# Patient Record
Sex: MALE | Race: WHITE | NOT HISPANIC OR LATINO | Employment: OTHER | URBAN - METROPOLITAN AREA
[De-identification: names, ages, dates, MRNs, and addresses within clinical notes are randomized per-mention and may not be internally consistent; named-entity substitution may affect disease eponyms.]

---

## 2021-05-24 ENCOUNTER — HOSPITAL ENCOUNTER (INPATIENT)
Facility: HOSPITAL | Age: 81
LOS: 4 days | Discharge: HOME | End: 2021-05-28
Attending: INTERNAL MEDICINE | Admitting: INTERNAL MEDICINE

## 2021-05-24 ENCOUNTER — APPOINTMENT (OUTPATIENT)
Dept: GENERAL RADIOLOGY | Facility: HOSPITAL | Age: 81
End: 2021-05-24

## 2021-05-24 DIAGNOSIS — I21.21 ST ELEVATION MYOCARDIAL INFARCTION INVOLVING LEFT CIRCUMFLEX CORONARY ARTERY (HCC): Primary | ICD-10-CM

## 2021-05-24 PROBLEM — J44.9 COPD (CHRONIC OBSTRUCTIVE PULMONARY DISEASE) (HCC): Chronic | Status: ACTIVE | Noted: 2021-05-24

## 2021-05-24 PROBLEM — E11.9 DM (DIABETES MELLITUS), TYPE 2: Chronic | Status: ACTIVE | Noted: 2021-05-24

## 2021-05-24 PROBLEM — G47.33 OSA ON CPAP: Chronic | Status: ACTIVE | Noted: 2021-05-24

## 2021-05-24 PROBLEM — Z99.89 OSA ON CPAP: Status: ACTIVE | Noted: 2021-05-24

## 2021-05-24 PROBLEM — E11.9 DM (DIABETES MELLITUS), TYPE 2 (HCC): Status: ACTIVE | Noted: 2021-05-24

## 2021-05-24 PROBLEM — E78.5 DYSLIPIDEMIA: Chronic | Status: ACTIVE | Noted: 2021-05-24

## 2021-05-24 PROBLEM — I21.3 STEMI (ST ELEVATION MYOCARDIAL INFARCTION): Status: ACTIVE | Noted: 2021-05-24

## 2021-05-24 PROBLEM — G47.33 OSA ON CPAP: Status: ACTIVE | Noted: 2021-05-24

## 2021-05-24 PROBLEM — I10 HTN (HYPERTENSION): Status: ACTIVE | Noted: 2021-05-24

## 2021-05-24 PROBLEM — N40.0 ENLARGED PROSTATE: Status: ACTIVE | Noted: 2021-05-24

## 2021-05-24 PROBLEM — E78.5 DYSLIPIDEMIA: Status: ACTIVE | Noted: 2021-05-24

## 2021-05-24 PROBLEM — I44.2 COMPLETE HEART BLOCK: Status: ACTIVE | Noted: 2021-05-24

## 2021-05-24 PROBLEM — Z99.89 OSA ON CPAP: Chronic | Status: ACTIVE | Noted: 2021-05-24

## 2021-05-24 PROBLEM — J44.9 COPD (CHRONIC OBSTRUCTIVE PULMONARY DISEASE) (HCC): Status: ACTIVE | Noted: 2021-05-24

## 2021-05-24 PROBLEM — I10 HTN (HYPERTENSION): Chronic | Status: ACTIVE | Noted: 2021-05-24

## 2021-05-24 PROBLEM — N40.0 ENLARGED PROSTATE: Chronic | Status: ACTIVE | Noted: 2021-05-24

## 2021-05-24 LAB
ALBUMIN SERPL-MCNC: 3.9 G/DL (ref 3.5–5.2)
ALBUMIN/GLOB SERPL: 1.1 G/DL
ALP SERPL-CCNC: 88 U/L (ref 39–117)
ALT SERPL W P-5'-P-CCNC: 36 U/L (ref 1–41)
ANION GAP SERPL CALCULATED.3IONS-SCNC: 18 MMOL/L (ref 5–15)
AST SERPL-CCNC: 90 U/L (ref 1–40)
BASOPHILS # BLD AUTO: 0.05 10*3/MM3 (ref 0–0.2)
BASOPHILS NFR BLD AUTO: 0.4 % (ref 0–1.5)
BILIRUB SERPL-MCNC: 0.3 MG/DL (ref 0–1.2)
BUN SERPL-MCNC: 37 MG/DL (ref 8–23)
BUN/CREAT SERPL: 20.8 (ref 7–25)
CALCIUM SPEC-SCNC: 9.6 MG/DL (ref 8.6–10.5)
CHLORIDE SERPL-SCNC: 101 MMOL/L (ref 98–107)
CO2 SERPL-SCNC: 15 MMOL/L (ref 22–29)
CREAT SERPL-MCNC: 1.78 MG/DL (ref 0.76–1.27)
DEPRECATED RDW RBC AUTO: 42 FL (ref 37–54)
EOSINOPHIL # BLD AUTO: 0.1 10*3/MM3 (ref 0–0.4)
EOSINOPHIL NFR BLD AUTO: 0.9 % (ref 0.3–6.2)
ERYTHROCYTE [DISTWIDTH] IN BLOOD BY AUTOMATED COUNT: 13.3 % (ref 12.3–15.4)
GFR SERPL CREATININE-BSD FRML MDRD: 37 ML/MIN/1.73
GLOBULIN UR ELPH-MCNC: 3.4 GM/DL
GLUCOSE BLDC GLUCOMTR-MCNC: 255 MG/DL (ref 70–130)
GLUCOSE BLDC GLUCOMTR-MCNC: 334 MG/DL (ref 70–130)
GLUCOSE SERPL-MCNC: 385 MG/DL (ref 65–99)
HBA1C MFR BLD: 11.7 % (ref 4.8–5.6)
HCT VFR BLD AUTO: 30 % (ref 37.5–51)
HGB BLD-MCNC: 11 G/DL (ref 13–17.7)
IMM GRANULOCYTES # BLD AUTO: 0.06 10*3/MM3 (ref 0–0.05)
IMM GRANULOCYTES NFR BLD AUTO: 0.5 % (ref 0–0.5)
LYMPHOCYTES # BLD AUTO: 1.82 10*3/MM3 (ref 0.7–3.1)
LYMPHOCYTES NFR BLD AUTO: 16 % (ref 19.6–45.3)
MCH RBC QN AUTO: 34.7 PG (ref 26.6–33)
MCHC RBC AUTO-ENTMCNC: 39.1 G/DL (ref 31.5–35.7)
MCV RBC AUTO: 88.8 FL (ref 79–97)
MONOCYTES # BLD AUTO: 0.58 10*3/MM3 (ref 0.1–0.9)
MONOCYTES NFR BLD AUTO: 5.1 % (ref 5–12)
NEUTROPHILS NFR BLD AUTO: 77.1 % (ref 42.7–76)
NEUTROPHILS NFR BLD AUTO: 8.76 10*3/MM3 (ref 1.7–7)
NRBC BLD AUTO-RTO: 0 /100 WBC (ref 0–0.2)
PLATELET # BLD AUTO: 250 10*3/MM3 (ref 140–450)
PMV BLD AUTO: 12.4 FL (ref 6–12)
POTASSIUM SERPL-SCNC: 4.7 MMOL/L (ref 3.5–5.2)
PROT SERPL-MCNC: 7.3 G/DL (ref 6–8.5)
QT INTERVAL: 400 MS
QTC INTERVAL: 461 MS
RBC # BLD AUTO: 3.4 10*6/MM3 (ref 4.14–5.8)
SODIUM SERPL-SCNC: 134 MMOL/L (ref 136–145)
TROPONIN T SERPL-MCNC: 1.5 NG/ML (ref 0–0.03)
TSH SERPL DL<=0.05 MIU/L-ACNC: 1.29 UIU/ML (ref 0.27–4.2)
WBC # BLD AUTO: 11.85 10*3/MM3 (ref 3.4–10.8)

## 2021-05-24 PROCEDURE — 82565 ASSAY OF CREATININE: CPT

## 2021-05-24 PROCEDURE — C1725 CATH, TRANSLUMIN NON-LASER: HCPCS | Performed by: INTERNAL MEDICINE

## 2021-05-24 PROCEDURE — 84484 ASSAY OF TROPONIN QUANT: CPT | Performed by: INTERNAL MEDICINE

## 2021-05-24 PROCEDURE — 5A1223Z PERFORMANCE OF CARDIAC PACING, CONTINUOUS: ICD-10-PCS | Performed by: INTERNAL MEDICINE

## 2021-05-24 PROCEDURE — 99223 1ST HOSP IP/OBS HIGH 75: CPT | Performed by: INTERNAL MEDICINE

## 2021-05-24 PROCEDURE — 84443 ASSAY THYROID STIM HORMONE: CPT | Performed by: INTERNAL MEDICINE

## 2021-05-24 PROCEDURE — C1769 GUIDE WIRE: HCPCS | Performed by: INTERNAL MEDICINE

## 2021-05-24 PROCEDURE — C1894 INTRO/SHEATH, NON-LASER: HCPCS | Performed by: INTERNAL MEDICINE

## 2021-05-24 PROCEDURE — 25010000002 ONDANSETRON PER 1 MG: Performed by: INTERNAL MEDICINE

## 2021-05-24 PROCEDURE — 25010000002 HEPARIN (PORCINE) PER 1000 UNITS: Performed by: INTERNAL MEDICINE

## 2021-05-24 PROCEDURE — 87636 SARSCOV2 & INF A&B AMP PRB: CPT | Performed by: NURSE PRACTITIONER

## 2021-05-24 PROCEDURE — 93454 CORONARY ARTERY ANGIO S&I: CPT | Performed by: INTERNAL MEDICINE

## 2021-05-24 PROCEDURE — 92941 PRQ TRLML REVSC TOT OCCL AMI: CPT | Performed by: INTERNAL MEDICINE

## 2021-05-24 PROCEDURE — 94660 CPAP INITIATION&MGMT: CPT

## 2021-05-24 PROCEDURE — 83036 HEMOGLOBIN GLYCOSYLATED A1C: CPT | Performed by: INTERNAL MEDICINE

## 2021-05-24 PROCEDURE — B2111ZZ FLUOROSCOPY OF MULTIPLE CORONARY ARTERIES USING LOW OSMOLAR CONTRAST: ICD-10-PCS | Performed by: INTERNAL MEDICINE

## 2021-05-24 PROCEDURE — 027034Z DILATION OF CORONARY ARTERY, ONE ARTERY WITH DRUG-ELUTING INTRALUMINAL DEVICE, PERCUTANEOUS APPROACH: ICD-10-PCS | Performed by: INTERNAL MEDICINE

## 2021-05-24 PROCEDURE — 3E033PZ INTRODUCTION OF PLATELET INHIBITOR INTO PERIPHERAL VEIN, PERCUTANEOUS APPROACH: ICD-10-PCS | Performed by: INTERNAL MEDICINE

## 2021-05-24 PROCEDURE — 85025 COMPLETE CBC W/AUTO DIFF WBC: CPT | Performed by: INTERNAL MEDICINE

## 2021-05-24 PROCEDURE — 25010000002 LORAZEPAM PER 2 MG: Performed by: INTERNAL MEDICINE

## 2021-05-24 PROCEDURE — C1887 CATHETER, GUIDING: HCPCS | Performed by: INTERNAL MEDICINE

## 2021-05-24 PROCEDURE — 25010000002 FENTANYL CITRATE (PF) 50 MCG/ML SOLUTION: Performed by: INTERNAL MEDICINE

## 2021-05-24 PROCEDURE — 74018 RADEX ABDOMEN 1 VIEW: CPT

## 2021-05-24 PROCEDURE — 25010000002 EPTIFIBATIDE 20 MG/10ML SOLUTION: Performed by: INTERNAL MEDICINE

## 2021-05-24 PROCEDURE — 25010000002 MORPHINE PER 10 MG: Performed by: INTERNAL MEDICINE

## 2021-05-24 PROCEDURE — 80053 COMPREHEN METABOLIC PANEL: CPT | Performed by: INTERNAL MEDICINE

## 2021-05-24 PROCEDURE — 93005 ELECTROCARDIOGRAM TRACING: CPT | Performed by: INTERNAL MEDICINE

## 2021-05-24 PROCEDURE — 0 IOPAMIDOL PER 1 ML: Performed by: INTERNAL MEDICINE

## 2021-05-24 PROCEDURE — 99222 1ST HOSP IP/OBS MODERATE 55: CPT | Performed by: INTERNAL MEDICINE

## 2021-05-24 PROCEDURE — C9606 PERC D-E COR REVASC W AMI S: HCPCS | Performed by: INTERNAL MEDICINE

## 2021-05-24 PROCEDURE — 85347 COAGULATION TIME ACTIVATED: CPT

## 2021-05-24 PROCEDURE — 82962 GLUCOSE BLOOD TEST: CPT

## 2021-05-24 PROCEDURE — C1874 STENT, COATED/COV W/DEL SYS: HCPCS | Performed by: INTERNAL MEDICINE

## 2021-05-24 PROCEDURE — 94799 UNLISTED PULMONARY SVC/PX: CPT

## 2021-05-24 DEVICE — XIENCE SIERRA™ EVEROLIMUS ELUTING CORONARY STENT SYSTEM 3.00 MM X 23 MM / RAPID-EXCHANGE
Type: IMPLANTABLE DEVICE | Status: FUNCTIONAL
Brand: XIENCE SIERRA™

## 2021-05-24 RX ORDER — ONDANSETRON 2 MG/ML
INJECTION INTRAMUSCULAR; INTRAVENOUS AS NEEDED
Status: DISCONTINUED | OUTPATIENT
Start: 2021-05-24 | End: 2021-05-24 | Stop reason: HOSPADM

## 2021-05-24 RX ORDER — INSULIN GLARGINE 100 [IU]/ML
50 INJECTION, SOLUTION SUBCUTANEOUS 2 TIMES DAILY
Status: ON HOLD | COMMUNITY
End: 2022-09-06

## 2021-05-24 RX ORDER — TIMOLOL MALEATE 5 MG/ML
1 SOLUTION/ DROPS OPHTHALMIC 2 TIMES DAILY
COMMUNITY

## 2021-05-24 RX ORDER — HYDROCODONE BITARTRATE AND ACETAMINOPHEN 7.5; 325 MG/1; MG/1
1 TABLET ORAL EVERY 4 HOURS PRN
Status: DISCONTINUED | OUTPATIENT
Start: 2021-05-24 | End: 2021-05-28 | Stop reason: HOSPADM

## 2021-05-24 RX ORDER — CLOPIDOGREL BISULFATE 75 MG/1
75 TABLET ORAL DAILY
Status: DISCONTINUED | OUTPATIENT
Start: 2021-05-25 | End: 2021-05-28 | Stop reason: HOSPADM

## 2021-05-24 RX ORDER — ALPRAZOLAM 0.25 MG/1
0.25 TABLET ORAL 3 TIMES DAILY PRN
Status: DISCONTINUED | OUTPATIENT
Start: 2021-05-24 | End: 2021-05-28 | Stop reason: HOSPADM

## 2021-05-24 RX ORDER — FENTANYL CITRATE 50 UG/ML
INJECTION, SOLUTION INTRAMUSCULAR; INTRAVENOUS AS NEEDED
Status: DISCONTINUED | OUTPATIENT
Start: 2021-05-24 | End: 2021-05-24 | Stop reason: HOSPADM

## 2021-05-24 RX ORDER — NALOXONE HCL 0.4 MG/ML
0.4 VIAL (ML) INJECTION
Status: DISCONTINUED | OUTPATIENT
Start: 2021-05-24 | End: 2021-05-28 | Stop reason: HOSPADM

## 2021-05-24 RX ORDER — EPTIFIBATIDE 2 MG/ML
INJECTION, SOLUTION INTRAVENOUS AS NEEDED
Status: DISCONTINUED | OUTPATIENT
Start: 2021-05-24 | End: 2021-05-24 | Stop reason: HOSPADM

## 2021-05-24 RX ORDER — ACETAMINOPHEN 325 MG/1
650 TABLET ORAL EVERY 4 HOURS PRN
Status: DISCONTINUED | OUTPATIENT
Start: 2021-05-24 | End: 2021-05-28 | Stop reason: HOSPADM

## 2021-05-24 RX ORDER — LIDOCAINE HYDROCHLORIDE 10 MG/ML
INJECTION, SOLUTION EPIDURAL; INFILTRATION; INTRACAUDAL; PERINEURAL AS NEEDED
Status: DISCONTINUED | OUTPATIENT
Start: 2021-05-24 | End: 2021-05-24 | Stop reason: HOSPADM

## 2021-05-24 RX ORDER — HEPARIN SODIUM 1000 [USP'U]/ML
INJECTION, SOLUTION INTRAVENOUS; SUBCUTANEOUS AS NEEDED
Status: DISCONTINUED | OUTPATIENT
Start: 2021-05-24 | End: 2021-05-24 | Stop reason: HOSPADM

## 2021-05-24 RX ORDER — LORAZEPAM 2 MG/ML
2 INJECTION INTRAMUSCULAR ONCE
Status: COMPLETED | OUTPATIENT
Start: 2021-05-24 | End: 2021-05-24

## 2021-05-24 RX ORDER — PRAVASTATIN SODIUM 40 MG
40 TABLET ORAL NIGHTLY
COMMUNITY
End: 2021-05-28 | Stop reason: HOSPADM

## 2021-05-24 RX ORDER — ROSUVASTATIN CALCIUM 10 MG/1
10 TABLET, COATED ORAL NIGHTLY
Status: DISCONTINUED | OUTPATIENT
Start: 2021-05-24 | End: 2021-05-28 | Stop reason: HOSPADM

## 2021-05-24 RX ORDER — CLOPIDOGREL BISULFATE 75 MG/1
TABLET ORAL AS NEEDED
Status: DISCONTINUED | OUTPATIENT
Start: 2021-05-24 | End: 2021-05-24 | Stop reason: HOSPADM

## 2021-05-24 RX ORDER — MULTIPLE VITAMINS W/ MINERALS TAB 9MG-400MCG
1 TAB ORAL DAILY
Status: ON HOLD | COMMUNITY
End: 2021-05-25

## 2021-05-24 RX ORDER — SODIUM CHLORIDE 9 MG/ML
100 INJECTION, SOLUTION INTRAVENOUS CONTINUOUS
Status: ACTIVE | OUTPATIENT
Start: 2021-05-24 | End: 2021-05-24

## 2021-05-24 RX ORDER — TIOTROPIUM BROMIDE AND OLODATEROL 3.124; 2.736 UG/1; UG/1
2 SPRAY, METERED RESPIRATORY (INHALATION)
Status: ON HOLD | COMMUNITY
End: 2022-09-07

## 2021-05-24 RX ORDER — LATANOPROST 50 UG/ML
1 SOLUTION/ DROPS OPHTHALMIC NIGHTLY
COMMUNITY

## 2021-05-24 RX ORDER — SODIUM CHLORIDE 9 MG/ML
250 INJECTION, SOLUTION INTRAVENOUS ONCE AS NEEDED
Status: DISCONTINUED | OUTPATIENT
Start: 2021-05-24 | End: 2021-05-28 | Stop reason: HOSPADM

## 2021-05-24 RX ORDER — MORPHINE SULFATE 2 MG/ML
1 INJECTION, SOLUTION INTRAMUSCULAR; INTRAVENOUS EVERY 4 HOURS PRN
Status: DISCONTINUED | OUTPATIENT
Start: 2021-05-24 | End: 2021-05-28 | Stop reason: HOSPADM

## 2021-05-24 RX ORDER — LORAZEPAM 2 MG/ML
1 INJECTION INTRAMUSCULAR ONCE
Status: DISCONTINUED | OUTPATIENT
Start: 2021-05-24 | End: 2021-05-24

## 2021-05-24 RX ORDER — FAMOTIDINE 20 MG/1
20 TABLET, FILM COATED ORAL 2 TIMES DAILY PRN
COMMUNITY

## 2021-05-24 RX ORDER — CLOPIDOGREL BISULFATE 75 MG/1
600 TABLET ORAL ONCE
Status: DISCONTINUED | OUTPATIENT
Start: 2021-05-24 | End: 2021-05-28 | Stop reason: HOSPADM

## 2021-05-24 RX ORDER — ASPIRIN 81 MG/1
81 TABLET ORAL DAILY
COMMUNITY

## 2021-05-24 RX ORDER — ASPIRIN 81 MG/1
81 TABLET ORAL DAILY
Status: DISCONTINUED | OUTPATIENT
Start: 2021-05-24 | End: 2021-05-28 | Stop reason: HOSPADM

## 2021-05-24 RX ORDER — SENNOSIDES 8.6 MG
650 CAPSULE ORAL EVERY 8 HOURS PRN
COMMUNITY
End: 2021-05-28 | Stop reason: HOSPADM

## 2021-05-24 RX ADMIN — SODIUM CHLORIDE 100 ML/HR: 9 INJECTION, SOLUTION INTRAVENOUS at 14:32

## 2021-05-24 RX ADMIN — HYDROCODONE BITARTRATE AND ACETAMINOPHEN 1 TABLET: 7.5; 325 TABLET ORAL at 21:12

## 2021-05-24 RX ADMIN — ASPIRIN 81 MG: 81 TABLET, COATED ORAL at 14:33

## 2021-05-24 RX ADMIN — MORPHINE SULFATE 1 MG: 2 INJECTION, SOLUTION INTRAMUSCULAR; INTRAVENOUS at 14:33

## 2021-05-24 RX ADMIN — LORAZEPAM 2 MG: 2 INJECTION INTRAMUSCULAR; INTRAVENOUS at 15:13

## 2021-05-24 RX ADMIN — ROSUVASTATIN CALCIUM 10 MG: 10 TABLET, COATED ORAL at 21:12

## 2021-05-24 RX ADMIN — ALPRAZOLAM 0.25 MG: 0.25 TABLET ORAL at 23:04

## 2021-05-25 ENCOUNTER — APPOINTMENT (OUTPATIENT)
Dept: CARDIOLOGY | Facility: HOSPITAL | Age: 81
End: 2021-05-25

## 2021-05-25 LAB
ACT BLD: 120 SECONDS (ref 82–152)
ANION GAP SERPL CALCULATED.3IONS-SCNC: 16 MMOL/L (ref 5–15)
BASOPHILS # BLD AUTO: 0.05 10*3/MM3 (ref 0–0.2)
BASOPHILS NFR BLD AUTO: 0.3 % (ref 0–1.5)
BUN SERPL-MCNC: 37 MG/DL (ref 8–23)
BUN/CREAT SERPL: 21.4 (ref 7–25)
CALCIUM SPEC-SCNC: 9.6 MG/DL (ref 8.6–10.5)
CHLORIDE SERPL-SCNC: 104 MMOL/L (ref 98–107)
CHOLEST SERPL-MCNC: 176 MG/DL (ref 0–200)
CO2 SERPL-SCNC: 18 MMOL/L (ref 22–29)
CREAT SERPL-MCNC: 1.73 MG/DL (ref 0.76–1.27)
DEPRECATED RDW RBC AUTO: 45.3 FL (ref 37–54)
EOSINOPHIL # BLD AUTO: 0.15 10*3/MM3 (ref 0–0.4)
EOSINOPHIL NFR BLD AUTO: 0.9 % (ref 0.3–6.2)
ERYTHROCYTE [DISTWIDTH] IN BLOOD BY AUTOMATED COUNT: 13.4 % (ref 12.3–15.4)
FLUAV SUBTYP SPEC NAA+PROBE: NOT DETECTED
FLUBV RNA ISLT QL NAA+PROBE: NOT DETECTED
GFR SERPL CREATININE-BSD FRML MDRD: 38 ML/MIN/1.73
GLUCOSE BLDC GLUCOMTR-MCNC: 191 MG/DL (ref 70–130)
GLUCOSE BLDC GLUCOMTR-MCNC: 212 MG/DL (ref 70–130)
GLUCOSE BLDC GLUCOMTR-MCNC: 294 MG/DL (ref 70–130)
GLUCOSE SERPL-MCNC: 199 MG/DL (ref 65–99)
HCT VFR BLD AUTO: 35.3 % (ref 37.5–51)
HCT VFR BLD AUTO: 39.4 % (ref 37.5–51)
HDLC SERPL-MCNC: 34 MG/DL (ref 40–60)
HGB BLD-MCNC: 11.4 G/DL (ref 13–17.7)
HGB BLD-MCNC: 12.3 G/DL (ref 13–17.7)
IMM GRANULOCYTES # BLD AUTO: 0.08 10*3/MM3 (ref 0–0.05)
IMM GRANULOCYTES NFR BLD AUTO: 0.5 % (ref 0–0.5)
LDLC SERPL CALC-MCNC: 105 MG/DL (ref 0–100)
LDLC/HDLC SERPL: 2.93 {RATIO}
LYMPHOCYTES # BLD AUTO: 2.35 10*3/MM3 (ref 0.7–3.1)
LYMPHOCYTES NFR BLD AUTO: 13.7 % (ref 19.6–45.3)
MCH RBC QN AUTO: 29.6 PG (ref 26.6–33)
MCHC RBC AUTO-ENTMCNC: 31.2 G/DL (ref 31.5–35.7)
MCV RBC AUTO: 94.9 FL (ref 79–97)
MONOCYTES # BLD AUTO: 1.49 10*3/MM3 (ref 0.1–0.9)
MONOCYTES NFR BLD AUTO: 8.7 % (ref 5–12)
NEUTROPHILS NFR BLD AUTO: 12.98 10*3/MM3 (ref 1.7–7)
NEUTROPHILS NFR BLD AUTO: 75.9 % (ref 42.7–76)
NRBC BLD AUTO-RTO: 0 /100 WBC (ref 0–0.2)
PLATELET # BLD AUTO: 240 10*3/MM3 (ref 140–450)
PMV BLD AUTO: 11.1 FL (ref 6–12)
POTASSIUM SERPL-SCNC: 4.6 MMOL/L (ref 3.5–5.2)
QT INTERVAL: 372 MS
QTC INTERVAL: 418 MS
RBC # BLD AUTO: 4.15 10*6/MM3 (ref 4.14–5.8)
SARS-COV-2 RNA PNL SPEC NAA+PROBE: NOT DETECTED
SODIUM SERPL-SCNC: 138 MMOL/L (ref 136–145)
TRIGL SERPL-MCNC: 212 MG/DL (ref 0–150)
TROPONIN T SERPL-MCNC: 2.7 NG/ML (ref 0–0.03)
VLDLC SERPL-MCNC: 37 MG/DL (ref 5–40)
WBC # BLD AUTO: 17.1 10*3/MM3 (ref 3.4–10.8)

## 2021-05-25 PROCEDURE — 94799 UNLISTED PULMONARY SVC/PX: CPT

## 2021-05-25 PROCEDURE — 63710000001 INSULIN LISPRO (HUMAN) PER 5 UNITS: Performed by: INTERNAL MEDICINE

## 2021-05-25 PROCEDURE — 80048 BASIC METABOLIC PNL TOTAL CA: CPT | Performed by: INTERNAL MEDICINE

## 2021-05-25 PROCEDURE — 93005 ELECTROCARDIOGRAM TRACING: CPT | Performed by: INTERNAL MEDICINE

## 2021-05-25 PROCEDURE — 80061 LIPID PANEL: CPT | Performed by: INTERNAL MEDICINE

## 2021-05-25 PROCEDURE — 99233 SBSQ HOSP IP/OBS HIGH 50: CPT | Performed by: INTERNAL MEDICINE

## 2021-05-25 PROCEDURE — 85018 HEMOGLOBIN: CPT | Performed by: NURSE PRACTITIONER

## 2021-05-25 PROCEDURE — 94640 AIRWAY INHALATION TREATMENT: CPT

## 2021-05-25 PROCEDURE — 85014 HEMATOCRIT: CPT | Performed by: NURSE PRACTITIONER

## 2021-05-25 PROCEDURE — 84484 ASSAY OF TROPONIN QUANT: CPT | Performed by: INTERNAL MEDICINE

## 2021-05-25 PROCEDURE — 99232 SBSQ HOSP IP/OBS MODERATE 35: CPT | Performed by: INTERNAL MEDICINE

## 2021-05-25 PROCEDURE — 93306 TTE W/DOPPLER COMPLETE: CPT

## 2021-05-25 PROCEDURE — 63710000001 INSULIN DETEMIR PER 5 UNITS: Performed by: INTERNAL MEDICINE

## 2021-05-25 PROCEDURE — 82962 GLUCOSE BLOOD TEST: CPT

## 2021-05-25 PROCEDURE — 85347 COAGULATION TIME ACTIVATED: CPT

## 2021-05-25 PROCEDURE — 25010000002 MORPHINE PER 10 MG: Performed by: INTERNAL MEDICINE

## 2021-05-25 PROCEDURE — 93306 TTE W/DOPPLER COMPLETE: CPT | Performed by: INTERNAL MEDICINE

## 2021-05-25 PROCEDURE — 85025 COMPLETE CBC W/AUTO DIFF WBC: CPT | Performed by: INTERNAL MEDICINE

## 2021-05-25 PROCEDURE — 93010 ELECTROCARDIOGRAM REPORT: CPT | Performed by: INTERNAL MEDICINE

## 2021-05-25 RX ORDER — GLIMEPIRIDE 2 MG/1
1 TABLET ORAL EVERY 12 HOURS SCHEDULED
Status: DISCONTINUED | OUTPATIENT
Start: 2021-05-25 | End: 2021-05-28 | Stop reason: HOSPADM

## 2021-05-25 RX ORDER — FAMOTIDINE 20 MG/1
20 TABLET, FILM COATED ORAL 2 TIMES DAILY PRN
Status: DISCONTINUED | OUTPATIENT
Start: 2021-05-25 | End: 2021-05-28 | Stop reason: HOSPADM

## 2021-05-25 RX ORDER — FINASTERIDE 5 MG/1
5 TABLET, FILM COATED ORAL DAILY
COMMUNITY

## 2021-05-25 RX ORDER — METOPROLOL SUCCINATE 25 MG/1
12.5 TABLET, EXTENDED RELEASE ORAL
Status: DISCONTINUED | OUTPATIENT
Start: 2021-05-25 | End: 2021-05-28 | Stop reason: HOSPADM

## 2021-05-25 RX ORDER — ASPIRIN 81 MG/1
81 TABLET, CHEWABLE ORAL DAILY
Status: DISCONTINUED | OUTPATIENT
Start: 2021-05-25 | End: 2021-05-25 | Stop reason: SDUPTHER

## 2021-05-25 RX ORDER — LISINOPRIL 2.5 MG/1
2.5 TABLET ORAL
Status: DISCONTINUED | OUTPATIENT
Start: 2021-05-25 | End: 2021-05-28 | Stop reason: HOSPADM

## 2021-05-25 RX ORDER — ATROPA BELLADONNA AND OPIUM 16.2; 6 MG/1; MG/1
60 SUPPOSITORY RECTAL ONCE
Status: COMPLETED | OUTPATIENT
Start: 2021-05-25 | End: 2021-05-25

## 2021-05-25 RX ORDER — LATANOPROST 50 UG/ML
1 SOLUTION/ DROPS OPHTHALMIC NIGHTLY
Status: DISCONTINUED | OUTPATIENT
Start: 2021-05-25 | End: 2021-05-28 | Stop reason: HOSPADM

## 2021-05-25 RX ORDER — CHLORAL HYDRATE 500 MG
1000 CAPSULE ORAL 2 TIMES DAILY WITH MEALS
COMMUNITY

## 2021-05-25 RX ORDER — ARFORMOTEROL TARTRATE 15 UG/2ML
15 SOLUTION RESPIRATORY (INHALATION)
Status: DISCONTINUED | OUTPATIENT
Start: 2021-05-25 | End: 2021-05-28 | Stop reason: HOSPADM

## 2021-05-25 RX ADMIN — IPRATROPIUM BROMIDE 0.5 MG: 0.5 SOLUTION RESPIRATORY (INHALATION) at 16:44

## 2021-05-25 RX ADMIN — ARFORMOTEROL TARTRATE 15 MCG: 15 SOLUTION RESPIRATORY (INHALATION) at 19:55

## 2021-05-25 RX ADMIN — CLOPIDOGREL BISULFATE 75 MG: 75 TABLET ORAL at 11:03

## 2021-05-25 RX ADMIN — HYDROCODONE BITARTRATE AND ACETAMINOPHEN 1 TABLET: 7.5; 325 TABLET ORAL at 02:26

## 2021-05-25 RX ADMIN — IPRATROPIUM BROMIDE 0.5 MG: 0.5 SOLUTION RESPIRATORY (INHALATION) at 19:55

## 2021-05-25 RX ADMIN — INSULIN LISPRO 8 UNITS: 100 INJECTION, SOLUTION INTRAVENOUS; SUBCUTANEOUS at 13:00

## 2021-05-25 RX ADMIN — INSULIN LISPRO 8 UNITS: 100 INJECTION, SOLUTION INTRAVENOUS; SUBCUTANEOUS at 17:34

## 2021-05-25 RX ADMIN — TIMOLOL MALEATE 1 DROP: 2.5 SOLUTION/ DROPS OPHTHALMIC at 21:24

## 2021-05-25 RX ADMIN — ROSUVASTATIN CALCIUM 10 MG: 10 TABLET, COATED ORAL at 21:24

## 2021-05-25 RX ADMIN — ARFORMOTEROL TARTRATE 15 MCG: 15 SOLUTION RESPIRATORY (INHALATION) at 16:44

## 2021-05-25 RX ADMIN — INSULIN LISPRO 2 UNITS: 100 INJECTION, SOLUTION INTRAVENOUS; SUBCUTANEOUS at 17:34

## 2021-05-25 RX ADMIN — MORPHINE SULFATE 1 MG: 2 INJECTION, SOLUTION INTRAMUSCULAR; INTRAVENOUS at 00:12

## 2021-05-25 RX ADMIN — INSULIN DETEMIR 25 UNITS: 100 INJECTION, SOLUTION SUBCUTANEOUS at 11:03

## 2021-05-25 RX ADMIN — LISINOPRIL 2.5 MG: 2.5 TABLET ORAL at 11:04

## 2021-05-25 RX ADMIN — ASPIRIN 81 MG: 81 TABLET, COATED ORAL at 11:04

## 2021-05-25 RX ADMIN — ACETAMINOPHEN 650 MG: 325 TABLET ORAL at 06:03

## 2021-05-25 RX ADMIN — METOPROLOL SUCCINATE 12.5 MG: 25 TABLET, EXTENDED RELEASE ORAL at 11:04

## 2021-05-25 RX ADMIN — ATROPA BELLADONNA AND OPIUM 60 MG: 16.2; 6 SUPPOSITORY RECTAL at 01:21

## 2021-05-25 RX ADMIN — FAMOTIDINE 20 MG: 20 TABLET ORAL at 21:32

## 2021-05-25 RX ADMIN — INSULIN LISPRO 6 UNITS: 100 INJECTION, SOLUTION INTRAVENOUS; SUBCUTANEOUS at 13:00

## 2021-05-26 LAB
ACT BLD: 252 SECONDS (ref 82–152)
ANION GAP SERPL CALCULATED.3IONS-SCNC: 11 MMOL/L (ref 5–15)
BACTERIA UR QL AUTO: ABNORMAL /HPF
BASOPHILS # BLD AUTO: 0.05 10*3/MM3 (ref 0–0.2)
BASOPHILS NFR BLD AUTO: 0.4 % (ref 0–1.5)
BH CV ECHO MEAS - AO MAX PG (FULL): 10.7 MMHG
BH CV ECHO MEAS - AO MAX PG: 14 MMHG
BH CV ECHO MEAS - AO MEAN PG (FULL): 6 MMHG
BH CV ECHO MEAS - AO MEAN PG: 8 MMHG
BH CV ECHO MEAS - AO ROOT AREA (BSA CORRECTED): 1.6
BH CV ECHO MEAS - AO ROOT AREA: 10.2 CM^2
BH CV ECHO MEAS - AO ROOT DIAM: 3.6 CM
BH CV ECHO MEAS - AO V2 MAX: 186 CM/SEC
BH CV ECHO MEAS - AO V2 MEAN: 140 CM/SEC
BH CV ECHO MEAS - AO V2 VTI: 37.1 CM
BH CV ECHO MEAS - ASC AORTA: 3.6 CM
BH CV ECHO MEAS - AVA(I,A): 1.9 CM^2
BH CV ECHO MEAS - AVA(I,D): 1.9 CM^2
BH CV ECHO MEAS - AVA(V,A): 1.5 CM^2
BH CV ECHO MEAS - AVA(V,D): 1.5 CM^2
BH CV ECHO MEAS - BSA(HAYCOCK): 2.2 M^2
BH CV ECHO MEAS - BSA: 2.2 M^2
BH CV ECHO MEAS - BZI_BMI: 26.6 KILOGRAMS/M^2
BH CV ECHO MEAS - BZI_METRIC_HEIGHT: 188 CM
BH CV ECHO MEAS - BZI_METRIC_WEIGHT: 93.9 KG
BH CV ECHO MEAS - EDV(CUBED): 79.5 ML
BH CV ECHO MEAS - EDV(MOD-SP2): 58.8 ML
BH CV ECHO MEAS - EDV(MOD-SP4): 91.7 ML
BH CV ECHO MEAS - EDV(TEICH): 83.1 ML
BH CV ECHO MEAS - EF(CUBED): 66 %
BH CV ECHO MEAS - EF(MOD-BP): 56.1 %
BH CV ECHO MEAS - EF(MOD-SP2): 57.7 %
BH CV ECHO MEAS - EF(MOD-SP4): 56.5 %
BH CV ECHO MEAS - EF(TEICH): 57.9 %
BH CV ECHO MEAS - ESV(CUBED): 27 ML
BH CV ECHO MEAS - ESV(MOD-SP2): 24.9 ML
BH CV ECHO MEAS - ESV(MOD-SP4): 39.9 ML
BH CV ECHO MEAS - ESV(TEICH): 35 ML
BH CV ECHO MEAS - FS: 30.2 %
BH CV ECHO MEAS - IVS/LVPW: 0.92
BH CV ECHO MEAS - IVSD: 1 CM
BH CV ECHO MEAS - LA DIMENSION: 3.8 CM
BH CV ECHO MEAS - LA/AO: 1.1
BH CV ECHO MEAS - LAD MAJOR: 7.3 CM
BH CV ECHO MEAS - LAT PEAK E' VEL: 7.6 CM/SEC
BH CV ECHO MEAS - LATERAL E/E' RATIO: 16.7
BH CV ECHO MEAS - LV DIASTOLIC VOL/BSA (35-75): 41.6 ML/M^2
BH CV ECHO MEAS - LV IVRT: 0.1 SEC
BH CV ECHO MEAS - LV MASS(C)D: 196.1 GRAMS
BH CV ECHO MEAS - LV MASS(C)DI: 88.9 GRAMS/M^2
BH CV ECHO MEAS - LV MAX PG: 3.3 MMHG
BH CV ECHO MEAS - LV MEAN PG: 2 MMHG
BH CV ECHO MEAS - LV SYSTOLIC VOL/BSA (12-30): 18.1 ML/M^2
BH CV ECHO MEAS - LV V1 MAX: 91.4 CM/SEC
BH CV ECHO MEAS - LV V1 MEAN: 71.3 CM/SEC
BH CV ECHO MEAS - LV V1 VTI: 22.8 CM
BH CV ECHO MEAS - LVIDD: 4.3 CM
BH CV ECHO MEAS - LVIDS: 3 CM
BH CV ECHO MEAS - LVLD AP2: 7.4 CM
BH CV ECHO MEAS - LVLD AP4: 7.6 CM
BH CV ECHO MEAS - LVLS AP2: 6.8 CM
BH CV ECHO MEAS - LVLS AP4: 6.3 CM
BH CV ECHO MEAS - LVOT AREA (M): 3.1 CM^2
BH CV ECHO MEAS - LVOT AREA: 3.1 CM^2
BH CV ECHO MEAS - LVOT DIAM: 2 CM
BH CV ECHO MEAS - LVPWD: 1.1 CM
BH CV ECHO MEAS - MED PEAK E' VEL: 5.8 CM/SEC
BH CV ECHO MEAS - MEDIAL E/E' RATIO: 22
BH CV ECHO MEAS - MR MAX PG: 107 MMHG
BH CV ECHO MEAS - MR MAX VEL: 516 CM/SEC
BH CV ECHO MEAS - MV A MAX VEL: 151 CM/SEC
BH CV ECHO MEAS - MV DEC TIME: 0.28 SEC
BH CV ECHO MEAS - MV E MAX VEL: 127 CM/SEC
BH CV ECHO MEAS - MV E/A: 0.84
BH CV ECHO MEAS - MV P1/2T: 82 MSEC
BH CV ECHO MEAS - MVA(P1/2T): 2.7 CM2
BH CV ECHO MEAS - PA MAX PG: 4.2 MMHG
BH CV ECHO MEAS - PA V2 MAX: 103 CM/SEC
BH CV ECHO MEAS - PI END-D VEL: 51.6 CM/SEC
BH CV ECHO MEAS - RAP SYSTOLE: 3 MMHG
BH CV ECHO MEAS - RVSP: 39 MMHG
BH CV ECHO MEAS - SI(AO): 171.3 ML/M^2
BH CV ECHO MEAS - SI(CUBED): 23.8 ML/M^2
BH CV ECHO MEAS - SI(LVOT): 32.5 ML/M^2
BH CV ECHO MEAS - SI(MOD-SP2): 15.4 ML/M^2
BH CV ECHO MEAS - SI(MOD-SP4): 23.5 ML/M^2
BH CV ECHO MEAS - SI(TEICH): 21.8 ML/M^2
BH CV ECHO MEAS - SV(AO): 377.6 ML
BH CV ECHO MEAS - SV(CUBED): 52.5 ML
BH CV ECHO MEAS - SV(LVOT): 71.6 ML
BH CV ECHO MEAS - SV(MOD-SP2): 33.9 ML
BH CV ECHO MEAS - SV(MOD-SP4): 51.8 ML
BH CV ECHO MEAS - SV(TEICH): 48.1 ML
BH CV ECHO MEAS - TAPSE (>1.6): 3.4 CM
BH CV ECHO MEAS - TR MAX PG: 36 MMHG
BH CV ECHO MEAS - TR MAX VEL: 297.5 CM/SEC
BH CV ECHO MEASUREMENTS AVERAGE E/E' RATIO: 18.96
BH CV VAS BP LEFT ARM: NORMAL MMHG
BH CV XLRA - RV BASE: 3.3 CM
BH CV XLRA - RV LENGTH: 9.2 CM
BH CV XLRA - RV MID: 2.7 CM
BH CV XLRA - TDI S': 16.2 CM/SEC
BILIRUB UR QL STRIP: NEGATIVE
BUN SERPL-MCNC: 46 MG/DL (ref 8–23)
BUN/CREAT SERPL: 21.8 (ref 7–25)
CALCIUM SPEC-SCNC: 8.9 MG/DL (ref 8.6–10.5)
CHLORIDE SERPL-SCNC: 103 MMOL/L (ref 98–107)
CLARITY UR: ABNORMAL
CO2 SERPL-SCNC: 23 MMOL/L (ref 22–29)
COLOR UR: ABNORMAL
CREAT BLDA-MCNC: 1.9 MG/DL (ref 0.6–1.3)
CREAT SERPL-MCNC: 2.11 MG/DL (ref 0.76–1.27)
DEPRECATED RDW RBC AUTO: 45.1 FL (ref 37–54)
EOSINOPHIL # BLD AUTO: 0.55 10*3/MM3 (ref 0–0.4)
EOSINOPHIL NFR BLD AUTO: 4.3 % (ref 0.3–6.2)
ERYTHROCYTE [DISTWIDTH] IN BLOOD BY AUTOMATED COUNT: 13.9 % (ref 12.3–15.4)
GFR SERPL CREATININE-BSD FRML MDRD: 30 ML/MIN/1.73
GLUCOSE BLDC GLUCOMTR-MCNC: 149 MG/DL (ref 70–130)
GLUCOSE BLDC GLUCOMTR-MCNC: 164 MG/DL (ref 70–130)
GLUCOSE BLDC GLUCOMTR-MCNC: 174 MG/DL (ref 70–130)
GLUCOSE SERPL-MCNC: 168 MG/DL (ref 65–99)
GLUCOSE UR STRIP-MCNC: NEGATIVE MG/DL
HCT VFR BLD AUTO: 31.3 % (ref 37.5–51)
HGB BLD-MCNC: 10.7 G/DL (ref 13–17.7)
HGB UR QL STRIP.AUTO: ABNORMAL
HYALINE CASTS UR QL AUTO: ABNORMAL /LPF
IMM GRANULOCYTES # BLD AUTO: 0.07 10*3/MM3 (ref 0–0.05)
IMM GRANULOCYTES NFR BLD AUTO: 0.5 % (ref 0–0.5)
KETONES UR QL STRIP: NEGATIVE
LEFT ATRIUM VOLUME INDEX: 35.1 ML/M^2
LEFT ATRIUM VOLUME: 77.4 ML
LEUKOCYTE ESTERASE UR QL STRIP.AUTO: ABNORMAL
LV EF 2D ECHO EST: 55 %
LYMPHOCYTES # BLD AUTO: 2.59 10*3/MM3 (ref 0.7–3.1)
LYMPHOCYTES NFR BLD AUTO: 20.2 % (ref 19.6–45.3)
MAXIMAL PREDICTED HEART RATE: 139 BPM
MCH RBC QN AUTO: 31.5 PG (ref 26.6–33)
MCHC RBC AUTO-ENTMCNC: 34.2 G/DL (ref 31.5–35.7)
MCV RBC AUTO: 92.1 FL (ref 79–97)
MONOCYTES # BLD AUTO: 1.39 10*3/MM3 (ref 0.1–0.9)
MONOCYTES NFR BLD AUTO: 10.8 % (ref 5–12)
NEUTROPHILS NFR BLD AUTO: 63.8 % (ref 42.7–76)
NEUTROPHILS NFR BLD AUTO: 8.2 10*3/MM3 (ref 1.7–7)
NITRITE UR QL STRIP: POSITIVE
NRBC BLD AUTO-RTO: 0 /100 WBC (ref 0–0.2)
PH UR STRIP.AUTO: <=5 [PH] (ref 5–8)
PLATELET # BLD AUTO: 231 10*3/MM3 (ref 140–450)
PMV BLD AUTO: 11.3 FL (ref 6–12)
POTASSIUM SERPL-SCNC: 4.7 MMOL/L (ref 3.5–5.2)
PROT UR QL STRIP: ABNORMAL
RBC # BLD AUTO: 3.4 10*6/MM3 (ref 4.14–5.8)
RBC # UR: ABNORMAL /HPF
REF LAB TEST METHOD: ABNORMAL
SODIUM SERPL-SCNC: 137 MMOL/L (ref 136–145)
SP GR UR STRIP: 1.01 (ref 1–1.03)
SQUAMOUS #/AREA URNS HPF: ABNORMAL /HPF
STRESS TARGET HR: 118 BPM
UROBILINOGEN UR QL STRIP: ABNORMAL
WBC # BLD AUTO: 12.85 10*3/MM3 (ref 3.4–10.8)
WBC UR QL AUTO: ABNORMAL /HPF

## 2021-05-26 PROCEDURE — 99232 SBSQ HOSP IP/OBS MODERATE 35: CPT | Performed by: INTERNAL MEDICINE

## 2021-05-26 PROCEDURE — 80048 BASIC METABOLIC PNL TOTAL CA: CPT | Performed by: INTERNAL MEDICINE

## 2021-05-26 PROCEDURE — 82962 GLUCOSE BLOOD TEST: CPT

## 2021-05-26 PROCEDURE — 87077 CULTURE AEROBIC IDENTIFY: CPT | Performed by: INTERNAL MEDICINE

## 2021-05-26 PROCEDURE — 63710000001 INSULIN DETEMIR PER 5 UNITS: Performed by: INTERNAL MEDICINE

## 2021-05-26 PROCEDURE — 94799 UNLISTED PULMONARY SVC/PX: CPT

## 2021-05-26 PROCEDURE — 63710000001 INSULIN LISPRO (HUMAN) PER 5 UNITS: Performed by: INTERNAL MEDICINE

## 2021-05-26 PROCEDURE — 94660 CPAP INITIATION&MGMT: CPT

## 2021-05-26 PROCEDURE — 87186 SC STD MICRODIL/AGAR DIL: CPT | Performed by: INTERNAL MEDICINE

## 2021-05-26 PROCEDURE — 81001 URINALYSIS AUTO W/SCOPE: CPT | Performed by: INTERNAL MEDICINE

## 2021-05-26 PROCEDURE — 87086 URINE CULTURE/COLONY COUNT: CPT | Performed by: INTERNAL MEDICINE

## 2021-05-26 PROCEDURE — 85025 COMPLETE CBC W/AUTO DIFF WBC: CPT | Performed by: INTERNAL MEDICINE

## 2021-05-26 RX ADMIN — INSULIN LISPRO 2 UNITS: 100 INJECTION, SOLUTION INTRAVENOUS; SUBCUTANEOUS at 08:59

## 2021-05-26 RX ADMIN — INSULIN LISPRO 8 UNITS: 100 INJECTION, SOLUTION INTRAVENOUS; SUBCUTANEOUS at 17:33

## 2021-05-26 RX ADMIN — INSULIN LISPRO 8 UNITS: 100 INJECTION, SOLUTION INTRAVENOUS; SUBCUTANEOUS at 12:14

## 2021-05-26 RX ADMIN — TIMOLOL MALEATE 1 DROP: 2.5 SOLUTION/ DROPS OPHTHALMIC at 20:42

## 2021-05-26 RX ADMIN — METOPROLOL SUCCINATE 12.5 MG: 25 TABLET, EXTENDED RELEASE ORAL at 08:58

## 2021-05-26 RX ADMIN — INSULIN LISPRO 8 UNITS: 100 INJECTION, SOLUTION INTRAVENOUS; SUBCUTANEOUS at 08:59

## 2021-05-26 RX ADMIN — ASPIRIN 81 MG: 81 TABLET, COATED ORAL at 08:57

## 2021-05-26 RX ADMIN — INSULIN LISPRO 2 UNITS: 100 INJECTION, SOLUTION INTRAVENOUS; SUBCUTANEOUS at 12:13

## 2021-05-26 RX ADMIN — CLOPIDOGREL BISULFATE 75 MG: 75 TABLET ORAL at 08:58

## 2021-05-26 RX ADMIN — ARFORMOTEROL TARTRATE 15 MCG: 15 SOLUTION RESPIRATORY (INHALATION) at 19:19

## 2021-05-26 RX ADMIN — IPRATROPIUM BROMIDE 0.5 MG: 0.5 SOLUTION RESPIRATORY (INHALATION) at 19:19

## 2021-05-26 RX ADMIN — TIMOLOL MALEATE 1 DROP: 2.5 SOLUTION/ DROPS OPHTHALMIC at 12:15

## 2021-05-26 RX ADMIN — ARFORMOTEROL TARTRATE 15 MCG: 15 SOLUTION RESPIRATORY (INHALATION) at 09:13

## 2021-05-26 RX ADMIN — LISINOPRIL 2.5 MG: 2.5 TABLET ORAL at 08:57

## 2021-05-26 RX ADMIN — ROSUVASTATIN CALCIUM 10 MG: 10 TABLET, COATED ORAL at 20:42

## 2021-05-26 RX ADMIN — INSULIN DETEMIR 50 UNITS: 100 INJECTION, SOLUTION SUBCUTANEOUS at 09:00

## 2021-05-26 RX ADMIN — LATANOPROST 1 DROP: 50 SOLUTION OPHTHALMIC at 20:42

## 2021-05-26 RX ADMIN — HYDROCODONE BITARTRATE AND ACETAMINOPHEN 1 TABLET: 7.5; 325 TABLET ORAL at 10:47

## 2021-05-26 RX ADMIN — IPRATROPIUM BROMIDE 0.5 MG: 0.5 SOLUTION RESPIRATORY (INHALATION) at 16:11

## 2021-05-26 RX ADMIN — IPRATROPIUM BROMIDE 0.5 MG: 0.5 SOLUTION RESPIRATORY (INHALATION) at 09:13

## 2021-05-26 RX ADMIN — HYDROCODONE BITARTRATE AND ACETAMINOPHEN 1 TABLET: 7.5; 325 TABLET ORAL at 16:55

## 2021-05-27 PROBLEM — N39.0 ACUTE UTI (URINARY TRACT INFECTION): Status: ACTIVE | Noted: 2021-05-27

## 2021-05-27 PROBLEM — R31.0 GROSS HEMATURIA: Status: ACTIVE | Noted: 2021-05-27

## 2021-05-27 LAB
ANION GAP SERPL CALCULATED.3IONS-SCNC: 13 MMOL/L (ref 5–15)
BUN SERPL-MCNC: 57 MG/DL (ref 8–23)
BUN/CREAT SERPL: 28.1 (ref 7–25)
CALCIUM SPEC-SCNC: 8.8 MG/DL (ref 8.6–10.5)
CHLORIDE SERPL-SCNC: 102 MMOL/L (ref 98–107)
CO2 SERPL-SCNC: 19 MMOL/L (ref 22–29)
CREAT SERPL-MCNC: 2.03 MG/DL (ref 0.76–1.27)
GFR SERPL CREATININE-BSD FRML MDRD: 32 ML/MIN/1.73
GLUCOSE BLDC GLUCOMTR-MCNC: 133 MG/DL (ref 70–130)
GLUCOSE BLDC GLUCOMTR-MCNC: 176 MG/DL (ref 70–130)
GLUCOSE BLDC GLUCOMTR-MCNC: 184 MG/DL (ref 70–130)
GLUCOSE SERPL-MCNC: 132 MG/DL (ref 65–99)
POTASSIUM SERPL-SCNC: 4.4 MMOL/L (ref 3.5–5.2)
SODIUM SERPL-SCNC: 134 MMOL/L (ref 136–145)

## 2021-05-27 PROCEDURE — 82962 GLUCOSE BLOOD TEST: CPT

## 2021-05-27 PROCEDURE — 80048 BASIC METABOLIC PNL TOTAL CA: CPT | Performed by: INTERNAL MEDICINE

## 2021-05-27 PROCEDURE — 25010000002 CEFTRIAXONE PER 250 MG: Performed by: INTERNAL MEDICINE

## 2021-05-27 PROCEDURE — 63710000001 INSULIN LISPRO (HUMAN) PER 5 UNITS: Performed by: INTERNAL MEDICINE

## 2021-05-27 PROCEDURE — 94799 UNLISTED PULMONARY SVC/PX: CPT

## 2021-05-27 PROCEDURE — 99232 SBSQ HOSP IP/OBS MODERATE 35: CPT | Performed by: INTERNAL MEDICINE

## 2021-05-27 PROCEDURE — 63710000001 INSULIN DETEMIR PER 5 UNITS: Performed by: INTERNAL MEDICINE

## 2021-05-27 PROCEDURE — 94660 CPAP INITIATION&MGMT: CPT

## 2021-05-27 RX ORDER — CEPHALEXIN 500 MG/1
500 CAPSULE ORAL 2 TIMES DAILY
Qty: 12 CAPSULE | Refills: 0 | Status: SHIPPED | OUTPATIENT
Start: 2021-05-27 | End: 2021-05-28 | Stop reason: SDUPTHER

## 2021-05-27 RX ORDER — AMOXICILLIN 250 MG
2 CAPSULE ORAL 2 TIMES DAILY
Status: DISCONTINUED | OUTPATIENT
Start: 2021-05-27 | End: 2021-05-28 | Stop reason: HOSPADM

## 2021-05-27 RX ADMIN — TIMOLOL MALEATE 1 DROP: 2.5 SOLUTION/ DROPS OPHTHALMIC at 21:30

## 2021-05-27 RX ADMIN — SODIUM CHLORIDE 1 G: 900 INJECTION INTRAVENOUS at 09:59

## 2021-05-27 RX ADMIN — INSULIN DETEMIR 50 UNITS: 100 INJECTION, SOLUTION SUBCUTANEOUS at 08:41

## 2021-05-27 RX ADMIN — HYDROCODONE BITARTRATE AND ACETAMINOPHEN 1 TABLET: 7.5; 325 TABLET ORAL at 16:18

## 2021-05-27 RX ADMIN — LATANOPROST 1 DROP: 50 SOLUTION OPHTHALMIC at 21:32

## 2021-05-27 RX ADMIN — INSULIN LISPRO 8 UNITS: 100 INJECTION, SOLUTION INTRAVENOUS; SUBCUTANEOUS at 17:15

## 2021-05-27 RX ADMIN — INSULIN LISPRO 2 UNITS: 100 INJECTION, SOLUTION INTRAVENOUS; SUBCUTANEOUS at 12:14

## 2021-05-27 RX ADMIN — TIMOLOL MALEATE 1 DROP: 2.5 SOLUTION/ DROPS OPHTHALMIC at 08:41

## 2021-05-27 RX ADMIN — CLOPIDOGREL BISULFATE 75 MG: 75 TABLET ORAL at 08:40

## 2021-05-27 RX ADMIN — ASPIRIN 81 MG: 81 TABLET, COATED ORAL at 08:40

## 2021-05-27 RX ADMIN — METOPROLOL SUCCINATE 12.5 MG: 25 TABLET, EXTENDED RELEASE ORAL at 08:39

## 2021-05-27 RX ADMIN — INSULIN LISPRO 2 UNITS: 100 INJECTION, SOLUTION INTRAVENOUS; SUBCUTANEOUS at 17:14

## 2021-05-27 RX ADMIN — ARFORMOTEROL TARTRATE 15 MCG: 15 SOLUTION RESPIRATORY (INHALATION) at 09:36

## 2021-05-27 RX ADMIN — IPRATROPIUM BROMIDE 0.5 MG: 0.5 SOLUTION RESPIRATORY (INHALATION) at 15:50

## 2021-05-27 RX ADMIN — IPRATROPIUM BROMIDE 0.5 MG: 0.5 SOLUTION RESPIRATORY (INHALATION) at 20:19

## 2021-05-27 RX ADMIN — INSULIN LISPRO 8 UNITS: 100 INJECTION, SOLUTION INTRAVENOUS; SUBCUTANEOUS at 12:14

## 2021-05-27 RX ADMIN — ARFORMOTEROL TARTRATE 15 MCG: 15 SOLUTION RESPIRATORY (INHALATION) at 20:19

## 2021-05-27 RX ADMIN — LISINOPRIL 2.5 MG: 2.5 TABLET ORAL at 08:40

## 2021-05-27 RX ADMIN — ROSUVASTATIN CALCIUM 10 MG: 10 TABLET, COATED ORAL at 21:29

## 2021-05-27 RX ADMIN — HYDROCODONE BITARTRATE AND ACETAMINOPHEN 1 TABLET: 7.5; 325 TABLET ORAL at 21:29

## 2021-05-27 RX ADMIN — DOCUSATE SODIUM 50MG AND SENNOSIDES 8.6MG 2 TABLET: 8.6; 5 TABLET, FILM COATED ORAL at 21:29

## 2021-05-27 RX ADMIN — IPRATROPIUM BROMIDE 0.5 MG: 0.5 SOLUTION RESPIRATORY (INHALATION) at 09:36

## 2021-05-27 RX ADMIN — INSULIN LISPRO 8 UNITS: 100 INJECTION, SOLUTION INTRAVENOUS; SUBCUTANEOUS at 08:40

## 2021-05-28 VITALS
TEMPERATURE: 98.4 F | BODY MASS INDEX: 26.56 KG/M2 | HEIGHT: 74 IN | DIASTOLIC BLOOD PRESSURE: 49 MMHG | HEART RATE: 80 BPM | OXYGEN SATURATION: 94 % | SYSTOLIC BLOOD PRESSURE: 116 MMHG | WEIGHT: 207 LBS | RESPIRATION RATE: 18 BRPM

## 2021-05-28 LAB
BACTERIA SPEC AEROBE CULT: ABNORMAL
GLUCOSE BLDC GLUCOMTR-MCNC: 144 MG/DL (ref 70–130)
GLUCOSE BLDC GLUCOMTR-MCNC: 193 MG/DL (ref 70–130)

## 2021-05-28 PROCEDURE — 63710000001 INSULIN DETEMIR PER 5 UNITS: Performed by: INTERNAL MEDICINE

## 2021-05-28 PROCEDURE — 94799 UNLISTED PULMONARY SVC/PX: CPT

## 2021-05-28 PROCEDURE — 25010000002 CEFTRIAXONE PER 250 MG: Performed by: INTERNAL MEDICINE

## 2021-05-28 PROCEDURE — 82962 GLUCOSE BLOOD TEST: CPT

## 2021-05-28 PROCEDURE — 63710000001 INSULIN LISPRO (HUMAN) PER 5 UNITS: Performed by: INTERNAL MEDICINE

## 2021-05-28 RX ORDER — METOPROLOL SUCCINATE 25 MG/1
12.5 TABLET, EXTENDED RELEASE ORAL
Qty: 30 TABLET | Refills: 11 | Status: SHIPPED | OUTPATIENT
Start: 2021-05-28 | End: 2022-01-27 | Stop reason: SDUPTHER

## 2021-05-28 RX ORDER — CLOPIDOGREL BISULFATE 75 MG/1
75 TABLET ORAL DAILY
Qty: 30 TABLET | Refills: 11 | Status: SHIPPED | OUTPATIENT
Start: 2021-05-28 | End: 2022-01-27 | Stop reason: SDUPTHER

## 2021-05-28 RX ORDER — CEPHALEXIN 500 MG/1
500 CAPSULE ORAL 2 TIMES DAILY
Qty: 16 CAPSULE | Refills: 0 | Status: SHIPPED | OUTPATIENT
Start: 2021-05-28 | End: 2021-06-05

## 2021-05-28 RX ORDER — LISINOPRIL 2.5 MG/1
2.5 TABLET ORAL
Qty: 30 TABLET | Refills: 11 | Status: SHIPPED | OUTPATIENT
Start: 2021-05-28 | End: 2022-01-27 | Stop reason: SDUPTHER

## 2021-05-28 RX ORDER — ROSUVASTATIN CALCIUM 10 MG/1
10 TABLET, COATED ORAL NIGHTLY
Qty: 30 TABLET | Refills: 11 | Status: SHIPPED | OUTPATIENT
Start: 2021-05-28 | End: 2022-01-27 | Stop reason: SDUPTHER

## 2021-05-28 RX ADMIN — LISINOPRIL 2.5 MG: 2.5 TABLET ORAL at 08:56

## 2021-05-28 RX ADMIN — FAMOTIDINE 20 MG: 20 TABLET ORAL at 13:40

## 2021-05-28 RX ADMIN — INSULIN LISPRO 8 UNITS: 100 INJECTION, SOLUTION INTRAVENOUS; SUBCUTANEOUS at 08:58

## 2021-05-28 RX ADMIN — ASPIRIN 81 MG: 81 TABLET, COATED ORAL at 08:56

## 2021-05-28 RX ADMIN — INSULIN LISPRO 2 UNITS: 100 INJECTION, SOLUTION INTRAVENOUS; SUBCUTANEOUS at 08:57

## 2021-05-28 RX ADMIN — TIMOLOL MALEATE 1 DROP: 2.5 SOLUTION/ DROPS OPHTHALMIC at 08:55

## 2021-05-28 RX ADMIN — IPRATROPIUM BROMIDE 0.5 MG: 0.5 SOLUTION RESPIRATORY (INHALATION) at 07:57

## 2021-05-28 RX ADMIN — CLOPIDOGREL BISULFATE 75 MG: 75 TABLET ORAL at 08:57

## 2021-05-28 RX ADMIN — INSULIN DETEMIR 50 UNITS: 100 INJECTION, SOLUTION SUBCUTANEOUS at 08:58

## 2021-05-28 RX ADMIN — SODIUM CHLORIDE 1 G: 900 INJECTION INTRAVENOUS at 08:55

## 2021-05-28 RX ADMIN — INSULIN LISPRO 8 UNITS: 100 INJECTION, SOLUTION INTRAVENOUS; SUBCUTANEOUS at 12:25

## 2021-05-28 RX ADMIN — ARFORMOTEROL TARTRATE 15 MCG: 15 SOLUTION RESPIRATORY (INHALATION) at 07:58

## 2021-05-28 RX ADMIN — DOCUSATE SODIUM 50MG AND SENNOSIDES 8.6MG 2 TABLET: 8.6; 5 TABLET, FILM COATED ORAL at 08:56

## 2021-05-28 RX ADMIN — METOPROLOL SUCCINATE 12.5 MG: 25 TABLET, EXTENDED RELEASE ORAL at 08:57

## 2021-05-28 RX ADMIN — HYDROCODONE BITARTRATE AND ACETAMINOPHEN 1 TABLET: 7.5; 325 TABLET ORAL at 13:15

## 2021-05-28 RX ADMIN — HYDROCODONE BITARTRATE AND ACETAMINOPHEN 1 TABLET: 7.5; 325 TABLET ORAL at 06:12

## 2021-05-29 ENCOUNTER — READMISSION MANAGEMENT (OUTPATIENT)
Dept: CALL CENTER | Facility: HOSPITAL | Age: 81
End: 2021-05-29

## 2021-05-29 NOTE — OUTREACH NOTE
Prep Survey      Responses   [facility] patient discharged from?  [facility]   Is LACE score < 7 ?  No   Emergency Room discharge w/ pulse ox?  No   Eligibility  Readm Mgmt   Discharge diagnosis  STEMI   Does the patient have one of the following disease processes/diagnoses(primary or secondary)?  Acute MI (STEMI,NSTEMI)   Does the patient have Home health ordered?  No   Is there a DME ordered?  No   Prep survey completed?  Yes          Ora Odell RN

## 2021-06-01 NOTE — DISCHARGE SUMMARY
Physician Discharge Summary     Patient ID:  Pro Pal  [id number]  80 y.o.  1940    Admit date: 5/24/2021    Discharge date and time: 5/28/2021  2:00 PM     Admitting Physician: Elisha Bettencourt MD     Primary Physician: Say Morales MD    Discharge Physician: HERMELINDA Carlisle    Admission Diagnoses: ST elevation myocardial infarction involving left circumflex coronary artery (CMS/[facility]) [I21.21]    Discharge Diagnoses:   Coronary artery disease  Patient presented with inferolateral STEMI, 5/2021.   Complicated by bradycardia/complete heart block requiring temporary pacing  LHC 5/24/21: s/p PTCA/BRENNEN to dominant circumflex.  Echocardiogram 5/25/21: EF 55%, grade II diastolic dysfunction, mild MR, mild TR. RVSP 39  MmHg.   Hyperlipidemia  Type II diabetes  Renal insufficiency  COPD  BPH, in & out cath's  [facility]    Cardiology Procedures this admission:    1.  LHC plus BRENNEN    Hospital Course:   Mr. Pal is an 79 y/o male with pmhx of diabetes and hyperlipidemia who presented to [facility] on 5/24/21 with complaints of severe midsternal chest pain. EKG demonstrated ST elevation in inferior leads. He was started on heparin gtt, given 180 mg of Brilinta, loaded with ASA and transferred to [facility]. Upon arrival to [facility], he was taken for urgent LHC where he received BRENNEN to dominant circumflex. He developed complete heart block during LHC and required temporary pacer. He was started on ASA, Plavix and statin therapy. From a cardiac standpoint he did very well. He did however develop hematuria and was evaluated by urology. He was diagnosed with UTI and started on Abx therapy. Remainder of hospital course was uneventful. On 5/28/21, he was felt to be stable and ready for discharge home. He will follow up with us in 4-6 weeks.     Discharge Exam:    Vitals:    05/28/21 1100   BP: 116/49   Pulse:    Resp: 18   Temp: 98.4 °F (36.9 °C)   SpO2:          General: Alert and  oriented.   Cardiovascular: Heart has a nondisplaced focal PMI. Regular rate and rhythm without murmur, gallop or rub.  Lungs: Clear without rales or wheezes. Equal expansion is noted.   Abdomen: Soft, nontender.  Extremities: Show no edema.  Skin: warm and dry.    Disposition: Patient will be discharged home    Patient discharge medications:      Your medication list        START taking these medications        Instructions Last Dose Given Next Dose Due   cephalexin 500 MG capsule  Commonly known as: Keflex  Notes to patient: Antibiotic - finish until gone      Take 1 capsule by mouth 2 (Two) Times a Day for 8 days.       clopidogrel 75 MG tablet  Commonly known as: PLAVIX  Notes to patient: To prevent blood clots      Take 1 tablet by mouth Daily.       lisinopril 2.5 MG tablet  Commonly known as: PRINIVIL,ZESTRIL  Notes to patient: To lower blood pressure      Take 1 tablet by mouth Daily.       metoprolol succinate XL 25 MG 24 hr tablet  Commonly known as: TOPROL-XL  Notes to patient: To lower blood pressure      Take 0.5 tablets by mouth Daily.       rosuvastatin 10 MG tablet  Commonly known as: CRESTOR  Notes to patient: To lower cholesterol      Take 1 tablet by mouth Every Night.              CONTINUE taking these medications        Instructions Last Dose Given Next Dose Due   aspirin 81 MG EC tablet      Take 81 mg by mouth Daily.       famotidine 20 MG tablet  Commonly known as: PEPCID      Take 20 mg by mouth 2 (Two) Times a Day As Needed for Heartburn.       finasteride 5 MG tablet  Commonly known as: PROSCAR      Take 5 mg by mouth Daily.       fish oil 1000 MG capsule capsule      Take 1,000 mg by mouth 2 (Two) Times a Day With Meals.       insulin aspart 100 UNIT/ML injection  Commonly known as: novoLOG      Inject 26-34 Units under the skin into the appropriate area as directed 3 (Three) Times a Day Before Meals. 34 units @breakfast, 28 units @lunch, 26 units @dinner       insulin glargine 100  UNIT/ML injection  Commonly known as: LANTUS, SEMGLEE      Inject 46 Units under the skin into the appropriate area as directed 2 (Two) Times a Day.       latanoprost 0.005 % ophthalmic solution  Commonly known as: XALATAN      Administer 1 drop to both eyes Every Night.       magnesium gluconate 500 MG tablet  Commonly known as: MAGONATE      Take 27 mg by mouth 2 (Two) Times a Day.       Stiolto Respimat 2.5-2.5 MCG/ACT aerosol solution inhaler  Generic drug: tiotropium bromide-olodaterol      Inhale 2 puffs Daily.       timolol 0.5 % ophthalmic solution  Commonly known as: TIMOPTIC      Administer 1 drop to both eyes Daily.              STOP taking these medications      acetaminophen 650 MG 8 hr tablet  Commonly known as: TYLENOL        pravastatin 40 MG tablet  Commonly known as: PRAVACHOL                  Where to Get Your Medications        These medications were sent to [facility] - [address] [phone]  - [phone] [address] [address]      Phone: [phone]   cephalexin 500 MG capsule  clopidogrel 75 MG tablet  lisinopril 2.5 MG tablet  metoprolol succinate XL 25 MG 24 hr tablet  rosuvastatin 10 MG tablet         Referenced discharge instructions provided by nursing for diet and activity.    Follow Up: 4-6 weeks    Signed:  HERMELINDA Carlisle  6/1/2021  12:07 EDT        Elisha Bettencourt MD, [facility]

## 2021-06-02 ENCOUNTER — READMISSION MANAGEMENT (OUTPATIENT)
Dept: CALL CENTER | Facility: HOSPITAL | Age: 81
End: 2021-06-02

## 2021-06-02 NOTE — OUTREACH NOTE
AMI Week 1 Survey      Responses   [facility] patient discharged from?  [place]   Does the patient have one of the following disease processes/diagnoses(primary or secondary)?  Acute MI (STEMI,NSTEMI)   Week 1 attempt successful?  No   Unsuccessful attempts  Attempt 1          Albino Donaldson RN

## 2021-06-08 ENCOUNTER — READMISSION MANAGEMENT (OUTPATIENT)
Dept: CALL CENTER | Facility: HOSPITAL | Age: 81
End: 2021-06-08

## 2021-06-08 NOTE — OUTREACH NOTE
AMI Week 1 Survey      Responses   [facility] patient discharged from?  [place]   Does the patient have one of the following disease processes/diagnoses(primary or secondary)?  Acute MI (STEMI,NSTEMI)   Week 1 attempt successful?  Yes   Call start time  1154   Call end time  1213   Discharge diagnosis  STEMI   Is patient permission given to speak with other caregiver?  No   List who call center can speak with  Patient only   Meds reviewed with patient/caregiver?  Yes   Is the patient having any side effects they believe may be caused by any medication additions or changes?  No   Does the patient have all prescriptions related to this admission filled (includes statins,anticoagulants,HTN meds,anti-arrhythmia meds)  Yes   Is the patient taking all medications as directed (includes completed medication regime)?  Yes   Medication comments  Insulin increased   Does the patient have a primary care provider?   Yes   Does the patient have an appointment with their PCP,cardiologist,or clinic within 7 days of discharge?  Yes   Has the patient kept scheduled appointments due by today?  Yes   Comments  Has called cardiology for appt      Saw PCP yesterday   Has home health visited the patient within 72 hours of discharge?  N/A   Psychosocial issues?  No   Did the patient receive a copy of their discharge instructions?  Yes   Nursing interventions  Reviewed instructions with patient   What is the patient's perception of their health status since discharge?  Improving   Nursing interventions  Nurse provided patient education   Is the patient/caregiver able to teach back signs and symptoms of when to call for help immediately:  Sudden chest discomfort, Sudden discomfort in arms, back, neck or jaw, Shortness of breath at any time, Dizziness or lightheadedness, Irregular or rapid heart rate, Nausea or vomiting, Sudden sweating or clammy skin   Nursing interventions  Nurse provided patient education   Is the pateint  /caregiver able to teach back the importance of cardiac rehab?  Yes   Nursing interventions  -- [Scheduled 06/16/2021]   Is the patient/caregiver able to teach back lifestyle changes to help prevent MIs  Managing diabetes, Regular exercise as approved by provider, Maintaining a healthy weight, Heart healthy diet, Quit smoking   Is the patient/caregiver able to teach back ways to prevent a second heart attack:  Take medications, Follow up with MD, Participate in Cardiac Rehab   If the patient is a current smoker, are they able to teach back resources for cessation?  Not a smoker   Is the patient/caregiver able to teach back the hierarchy of who to call/visit for symptoms/problems? PCP, Specialist, Home health nurse, Urgent Care, ED, 911  Yes   Week 1 call completed?  Yes   Wrap up additional comments  Very satisfied with hospital stay.  Iva was outstanding.          Miranda Ceballos RN

## 2021-06-15 ENCOUNTER — READMISSION MANAGEMENT (OUTPATIENT)
Dept: CALL CENTER | Facility: HOSPITAL | Age: 81
End: 2021-06-15

## 2021-06-15 NOTE — OUTREACH NOTE
AMI Week 2 Survey      Responses   [facility] patient discharged from?  [place]   Does the patient have one of the following disease processes/diagnoses(primary or secondary)?  Acute MI (STEMI,NSTEMI)   Week 2 attempt successful?  Yes   Call start time  1648   Call end time  1656   Discharge diagnosis  STEMI   Meds reviewed with patient/caregiver?  Yes   Is the patient having any side effects they believe may be caused by any medication additions or changes?  No   Does the patient have all prescriptions related to this admission filled (includes statins,anticoagulants,HTN meds,anti-arrhythmia meds)  Yes   Is the patient taking all medications as directed (includes completed medication regime)?  Yes   Does the patient have a primary care provider?   Yes   Does the patient have an appointment with their PCP,cardiologist,or clinic within 7 days of discharge?  Yes   Has the patient kept scheduled appointments due by today?  Yes   Comments  Rehab starts tomorrow. He says he is weak.   Has home health visited the patient within 72 hours of discharge?  N/A   Psychosocial issues?  No   Did the patient receive a copy of their discharge instructions?  Yes   Nursing interventions  Reviewed instructions with patient   What is the patient's perception of their health status since discharge?  Improving   Nursing interventions  Nurse provided patient education   Is the patient/caregiver able to teach back signs and symptoms of when to call for help immediately:  Sudden chest discomfort, Sudden discomfort in arms, back, neck or jaw, Shortness of breath at any time, Sudden sweating or clammy skin, Nausea or vomiting, Irregular or rapid heart rate, Dizziness or lightheadedness [Dizzy on rising.]   Nursing interventions  Nurse provided patient education   Is the pateint /caregiver able to teach back the importance of cardiac rehab?  Yes   Nursing interventions  Provided education on importance of cardiac rehab   Is the  patient/caregiver able to teach back lifestyle changes to help prevent MIs  Regular exercise as approved by provider, Heart healthy diet, Maintaining a healthy weight, Reducing stress   Is the patient/caregiver able to teach back ways to prevent a second heart attack:  Take medications, Follow up with MD, Participate in Cardiac Rehab   If the patient is a current smoker, are they able to teach back resources for cessation?  Not a smoker   Is the patient/caregiver able to teach back the hierarchy of who to call/visit for symptoms/problems? PCP, Specialist, Home health nurse, Urgent Care, ED, 911  Yes   Additional teach back comments  BP is running a bit high with his recovery and anxiety. Encouraged movement.    Week 2 call completed?  Yes   Wrap up additional comments  He will reach out to a clinical dietician. He says he is looking forward to rehab.          Sana Cameron RN

## 2021-06-23 ENCOUNTER — READMISSION MANAGEMENT (OUTPATIENT)
Dept: CALL CENTER | Facility: HOSPITAL | Age: 81
End: 2021-06-23

## 2021-06-23 NOTE — OUTREACH NOTE
AMI Week 3 Survey      Responses   [facility] patient discharged from?  [place]   Does the patient have one of the following disease processes/diagnoses(primary or secondary)?  Acute MI (STEMI,NSTEMI)   Week 3 attempt successful?  Yes   Call start time  1726   Call end time  1733   Discharge diagnosis  STEMI   Is patient permission given to speak with other caregiver?  No   Meds reviewed with patient/caregiver?  Yes   Is the patient having any side effects they believe may be caused by any medication additions or changes?  No   Does the patient have all prescriptions related to this admission filled (includes statins,anticoagulants,HTN meds,anti-arrhythmia meds)  Yes   Is the patient taking all medications as directed (includes completed medication regime)?  Yes   Does the patient have a primary care provider?   Yes   Has the patient kept scheduled appointments due by today?  Yes   Has home health visited the patient within 72 hours of discharge?  N/A   Psychosocial issues?  No   Comments  Patient monitoring home blood sugar, he has had intermittent high readings, and insulin dosage has been adjusted.    Did the patient receive a copy of their discharge instructions?  Yes   Nursing interventions  Reviewed instructions with patient   What is the patient's perception of their health status since discharge?  Improving   Nursing interventions  Nurse provided patient education   Is the pateint /caregiver able to teach back the importance of cardiac rehab?  Yes   Nursing interventions  Provided education on importance of cardiac rehab [Patient has started cardiac rehab. ]   Is the patient/caregiver able to teach back lifestyle changes to help prevent MIs  Regular exercise as approved by provider, Managing diabetes   Is the patient/caregiver able to teach back ways to prevent a second heart attack:  Take medications, Follow up with MD, Participate in Cardiac Rehab, Manage risk factors   If the patient is a current  smoker, are they able to teach back resources for cessation?  Not a smoker   Is the patient/caregiver able to teach back the hierarchy of who to call/visit for symptoms/problems? PCP, Specialist, Home health nurse, Urgent Care, ED, 911  Yes   Week 3 call completed?  Yes          Amber Mason RN

## 2021-07-01 ENCOUNTER — READMISSION MANAGEMENT (OUTPATIENT)
Dept: CALL CENTER | Facility: HOSPITAL | Age: 81
End: 2021-07-01

## 2021-07-01 NOTE — OUTREACH NOTE
AMI Week 4 Survey      Responses   [facility] patient discharged from?  [place]   Does the patient have one of the following disease processes/diagnoses(primary or secondary)?  Acute MI (STEMI,NSTEMI)   Week 4 attempt successful?  No          Ally Dennis RN

## 2021-07-21 NOTE — PROGRESS NOTES
[facility] Cardiology    Patient ID: Pro Pal is a 81 y.o. male.  : 1940   Contact: [phone]    Encounter date: 2021    PCP: Say Morales MD      Chief complaint:   Chief Complaint   Patient presents with   • Coronary Artery Disease     Problem List:  1. Coronary artery disease  a. Inferolateral STEMI, 2021  1. Complicated by bradycardia/complete HB requiring temporary pacing.   b. LHC, 2021: s/p PTCA/BRENNEN to dominant circumflex.  c. Echocardiogram, 2021: EF 55%. Grade II diastolic dysfunction, mild MR, mild TR. RVSP 39  MmHg.  2. Hyperlipidemia   3. Type II diabetes mellitus  4. Renal insufficiency  5. COPD  6. BPH, in & out cath's  a. [facility]     No Known Allergies    Current Medications:    Current Outpatient Medications:   •  acetaminophen (TYLENOL) 500 MG tablet, Take 500 mg by mouth Every 6 (Six) Hours As Needed for Mild Pain ., Disp: , Rfl:   •  albuterol sulfate  (90 Base) MCG/ACT inhaler, Inhale 2 puffs Every 4 (Four) Hours As Needed for Wheezing., Disp: , Rfl:   •  amLODIPine (NORVASC) 2.5 MG tablet, Take 2.5 mg by mouth Daily., Disp: , Rfl:   •  aspirin (aspirin) 81 MG EC tablet, Take 81 mg by mouth Daily., Disp: , Rfl:   •  clopidogrel (PLAVIX) 75 MG tablet, Take 1 tablet by mouth Daily., Disp: 30 tablet, Rfl: 11  •  famotidine (PEPCID) 20 MG tablet, Take 20 mg by mouth 2 (Two) Times a Day As Needed for Heartburn., Disp: , Rfl:   •  finasteride (PROSCAR) 5 MG tablet, Take 5 mg by mouth Daily., Disp: , Rfl:   •  insulin aspart (novoLOG) 100 UNIT/ML injection, Inject 35 Units under the skin into the appropriate area as directed 3 (Three) Times a Day Before Meals., Disp: , Rfl:   •  insulin glargine (LANTUS, SEMGLEE) 100 UNIT/ML injection, Inject 50 Units under the skin into the appropriate area as directed 2 (Two) Times a Day., Disp: , Rfl:   •  latanoprost (XALATAN) 0.005 % ophthalmic solution, Administer 1 drop to both  eyes Every Night., Disp: , Rfl:   •  lisinopril (PRINIVIL,ZESTRIL) 2.5 MG tablet, Take 1 tablet by mouth Daily., Disp: 30 tablet, Rfl: 11  •  loratadine (CLARITIN) 10 MG tablet, Take 10 mg by mouth As Needed for Allergies., Disp: , Rfl:   •  MAGNESIUM GLUCONATE PO, Take 400 mg by mouth Daily., Disp: , Rfl:   •  metoprolol succinate XL (TOPROL-XL) 25 MG 24 hr tablet, Take 0.5 tablets by mouth Daily., Disp: 30 tablet, Rfl: 11  •  Omega-3 Fatty Acids (fish oil) 1000 MG capsule capsule, Take 1,000 mg by mouth 2 (Two) Times a Day With Meals., Disp: , Rfl:   •  pioglitazone (ACTOS) 30 MG tablet, Take 30 mg by mouth Daily., Disp: , Rfl:   •  rosuvastatin (CRESTOR) 10 MG tablet, Take 1 tablet by mouth Every Night., Disp: 30 tablet, Rfl: 11  •  tamsulosin (FLOMAX) 0.4 MG capsule 24 hr capsule, Take 0.4 mg by mouth Daily., Disp: , Rfl:   •  timolol (TIMOPTIC) 0.5 % ophthalmic solution, Administer 1 drop to both eyes 2 (Two) Times a Day., Disp: , Rfl:   •  tiotropium bromide-olodaterol (Stiolto Respimat) 2.5-2.5 MCG/ACT aerosol solution inhaler, Inhale 2 puffs Daily., Disp: , Rfl:     HPI    Pro Pal is a 81 y.o. male who presents today for a 6 week follow up of coronary artery disease s/p left heart catheterization and cardiac risk factors. Since last visit, patient has done well overall. No exertional chest pain. He reports having palpitations which sound like PVCs leading up to his STEMI. He has not had any further episodes since his stent. He is doing cardiac rehab at [facility] three days per week. He has not been exercising routinely otherwise. BP has been well controlled.      The following portions of the patient's history were reviewed and updated as appropriate: allergies, current medications and problem list.    Pertinent positives as listed in the HPI.  All other systems reviewed are negative.         Vitals:    07/22/21 1435   BP: 106/58   BP Location: Left arm   Patient Position: Sitting   Cuff  "Size: Adult   Pulse: 77   SpO2: 98%   Weight: 103 kg (227 lb)   Height: 188 cm (74\")       Physical Exam:  General: Alert and oriented.  Neck: Jugular venous pressure is within normal limits. Carotids have normal upstrokes without bruits.   Cardiovascular: Heart has a nondisplaced focal PMI. Regular rate and rhythm. No murmur, gallop or rub.  Lungs: Clear, no rales or wheezes. Equal expansion is noted.   Extremities: Show no edema.  Skin: Warm and dry.  Neurologic: Nonfocal.     Diagnostic Data (reviewed with patient):     Lab Results   Component Value Date    GLUCOSE 132 (H) 05/27/2021    BUN 57 (H) 05/27/2021    CREATININE 2.03 (H) 05/27/2021    EGFRIFNONA 32 (L) 05/27/2021    BCR 28.1 (H) 05/27/2021     (L) 05/27/2021    K 4.4 05/27/2021     05/27/2021    CO2 19.0 (L) 05/27/2021    CALCIUM 8.8 05/27/2021    ALBUMIN 3.90 05/24/2021    ALKPHOS 88 05/24/2021    AST 90 (H) 05/24/2021    ALT 36 05/24/2021     Lab Results   Component Value Date    CHOL 176 05/25/2021    TRIG 212 (H) 05/25/2021    HDL 34 (L) 05/25/2021     (H) 05/25/2021      Lab Results   Component Value Date    WBC 12.85 (H) 05/26/2021    RBC 3.40 (L) 05/26/2021    HGB 10.7 (L) 05/26/2021    HCT 31.3 (L) 05/26/2021    MCV 92.1 05/26/2021     05/26/2021      Lab Results   Component Value Date    TSH 1.290 05/24/2021        Procedures      Assessment:    ICD-10-CM ICD-9-CM   1. Coronary artery disease involving native coronary artery of native heart, angina presence unspecified  I25.10 414.01   2. Essential hypertension  I10 401.9   3. Dyslipidemia  E78.5 272.4         Plan:  1. Patient was counseled to begin aerobic exercise 30 min per day for at least 4 days per week.   2. Continue aspirin 81 mg and Plavix 75 mg daily for DAPT.  3. Continue on lisinopril 2.5 mg and metoprolol 25 mg for hypertension.   4. Continue on rosuvastatin 10 mg daily for hyperlipidemia.   5. Check FLP, CMP in August.   6. Send Rx for SL " NTG  7. Continue on Omega-3 fatty acids for hyperlipidemia.  8. Continue all other current medications.  9. F/up in 6 months, sooner if needed.      Electronically signed by HERMELINDA Carlisle, 07/22/21, 3:01 PM EDT.

## 2021-07-22 ENCOUNTER — OFFICE VISIT (OUTPATIENT)
Dept: CARDIOLOGY | Facility: CLINIC | Age: 81
End: 2021-07-22

## 2021-07-22 VITALS
WEIGHT: 227 LBS | DIASTOLIC BLOOD PRESSURE: 58 MMHG | HEIGHT: 74 IN | HEART RATE: 77 BPM | BODY MASS INDEX: 29.13 KG/M2 | SYSTOLIC BLOOD PRESSURE: 106 MMHG | OXYGEN SATURATION: 98 %

## 2021-07-22 DIAGNOSIS — I10 ESSENTIAL HYPERTENSION: ICD-10-CM

## 2021-07-22 DIAGNOSIS — E78.5 DYSLIPIDEMIA: ICD-10-CM

## 2021-07-22 DIAGNOSIS — I25.10 CORONARY ARTERY DISEASE INVOLVING NATIVE CORONARY ARTERY OF NATIVE HEART, ANGINA PRESENCE UNSPECIFIED: Primary | ICD-10-CM

## 2021-07-22 PROCEDURE — 99213 OFFICE O/P EST LOW 20 MIN: CPT | Performed by: NURSE PRACTITIONER

## 2021-07-22 RX ORDER — ALBUTEROL SULFATE 90 UG/1
2 AEROSOL, METERED RESPIRATORY (INHALATION) EVERY 4 HOURS PRN
COMMUNITY

## 2021-07-22 RX ORDER — TAMSULOSIN HYDROCHLORIDE 0.4 MG/1
0.4 CAPSULE ORAL DAILY
COMMUNITY
Start: 2021-07-05 | End: 2021-10-03

## 2021-07-22 RX ORDER — AMLODIPINE BESYLATE 2.5 MG/1
2.5 TABLET ORAL DAILY
COMMUNITY
Start: 2021-07-05 | End: 2022-01-27 | Stop reason: SDUPTHER

## 2021-07-22 RX ORDER — NITROGLYCERIN 0.4 MG/1
TABLET SUBLINGUAL
Qty: 100 TABLET | Refills: 11 | Status: SHIPPED | OUTPATIENT
Start: 2021-07-22

## 2021-07-22 RX ORDER — PIOGLITAZONEHYDROCHLORIDE 30 MG/1
30 TABLET ORAL DAILY
Status: ON HOLD | COMMUNITY
Start: 2021-07-05 | End: 2022-09-06

## 2021-07-22 RX ORDER — LORATADINE 10 MG/1
10 TABLET ORAL AS NEEDED
COMMUNITY

## 2021-07-22 RX ORDER — ACETAMINOPHEN 500 MG
500 TABLET ORAL EVERY 6 HOURS PRN
COMMUNITY

## 2022-01-26 NOTE — PROGRESS NOTES
[facility] Cardiology    Patient ID: Pro Pal is a 81 y.o. male.  : 1940   Contact: [phone]    Encounter date: 2022    PCP: Say Morales MD      Chief complaint:   Chief Complaint   Patient presents with   • Hypertension   • Coronary Artery Disease   • STEMI   • Diabetes       Problem List:  1. Coronary artery disease  a. Inferolateral STEMI, 2021  1. Complicated by bradycardia/complete HB requiring temporary pacing.   b. LHC, 2021: s/p PTCA/BRENNEN to dominant circumflex.  c. Echocardiogram, 2021: EF 55%. Grade II diastolic dysfunction, mild MR, mild TR. RVSP 39  MmHg.  2. Hyperlipidemia   3. Type II diabetes mellitus  4. Renal insufficiency  5. COPD  6. BPH, in & out cath's  a. [facility]    No Known Allergies    Current Medications:    Current Outpatient Medications:   •  Accu-Chek Guide test strip, , Disp: , Rfl:   •  acetaminophen (TYLENOL) 500 MG tablet, Take 500 mg by mouth Every 6 (Six) Hours As Needed for Mild Pain ., Disp: , Rfl:   •  albuterol sulfate  (90 Base) MCG/ACT inhaler, Inhale 2 puffs Every 4 (Four) Hours As Needed for Wheezing., Disp: , Rfl:   •  amLODIPine (NORVASC) 2.5 MG tablet, Take 2.5 mg by mouth Daily., Disp: , Rfl:   •  aspirin (aspirin) 81 MG EC tablet, Take 81 mg by mouth Daily., Disp: , Rfl:   •  B-D ULTRAFINE III SHORT PEN 31G X 8 MM misc, , Disp: , Rfl:   •  capsaicin (ZOSTRIX) 0.025 % cream, Apply 1 application topically to the appropriate area as directed., Disp: , Rfl:   •  clopidogrel (PLAVIX) 75 MG tablet, Take 1 tablet by mouth Daily., Disp: 30 tablet, Rfl: 11  •  famotidine (PEPCID) 20 MG tablet, Take 20 mg by mouth 2 (Two) Times a Day As Needed for Heartburn., Disp: , Rfl:   •  finasteride (PROSCAR) 5 MG tablet, Take 5 mg by mouth Daily., Disp: , Rfl:   •  insulin aspart (novoLOG) 100 UNIT/ML injection, Inject 35 Units under the skin into the appropriate area as directed 3 (Three) Times a Day  Before Meals., Disp: , Rfl:   •  insulin glargine (LANTUS, SEMGLEE) 100 UNIT/ML injection, Inject 50 Units under the skin into the appropriate area as directed 2 (Two) Times a Day., Disp: , Rfl:   •  latanoprost (XALATAN) 0.005 % ophthalmic solution, Administer 1 drop to both eyes Every Night., Disp: , Rfl:   •  lisinopril (PRINIVIL,ZESTRIL) 2.5 MG tablet, Take 1 tablet by mouth Daily., Disp: 30 tablet, Rfl: 11  •  loratadine (CLARITIN) 10 MG tablet, Take 10 mg by mouth As Needed for Allergies., Disp: , Rfl:   •  MAGNESIUM GLUCONATE PO, Take 400 mg by mouth Daily., Disp: , Rfl:   •  metoprolol succinate XL (TOPROL-XL) 25 MG 24 hr tablet, Take 0.5 tablets by mouth Daily., Disp: 30 tablet, Rfl: 11  •  nitroglycerin (NITROSTAT) 0.4 MG SL tablet, 1 under the tongue as needed for angina, may repeat q5mins for up three doses, Disp: 100 tablet, Rfl: 11  •  Omega-3 Fatty Acids (fish oil) 1000 MG capsule capsule, Take 1,000 mg by mouth 2 (Two) Times a Day With Meals., Disp: , Rfl:   •  pioglitazone (ACTOS) 30 MG tablet, Take 30 mg by mouth Daily., Disp: , Rfl:   •  rosuvastatin (CRESTOR) 10 MG tablet, Take 1 tablet by mouth Every Night., Disp: 30 tablet, Rfl: 11  •  timolol (TIMOPTIC) 0.5 % ophthalmic solution, Administer 1 drop to both eyes 2 (Two) Times a Day., Disp: , Rfl:   •  tiotropium bromide-olodaterol (Stiolto Respimat) 2.5-2.5 MCG/ACT aerosol solution inhaler, Inhale 2 puffs Daily., Disp: , Rfl:     HPI    Pro Pal is a 81 y.o. male who presents today for a 6 month follow up of coronary artery disease and cardiac risk factors. Since last visit, patient has done well overall. No chest pain, SOA, PND, orthopnea, edema. She has occasional palpitations. BP controlled- usually 120's systolic. He is unsure on his current medications. He is due for FLP. He does not exercise.       The following portions of the patient's history were reviewed and updated as appropriate: allergies, current medications and problem  "list.    Pertinent positives as listed in the HPI.  All other systems reviewed are negative.         Vitals:    01/27/22 0842   BP: 140/72   BP Location: Left arm   Patient Position: Sitting   Pulse: 93   SpO2: 96%   Weight: 107 kg (235 lb)   Height: 188 cm (74.02\")       Physical Exam:  General: Alert and oriented.  Neck: Jugular venous pressure is within normal limits. Carotids have normal upstrokes without bruits.   Cardiovascular: Heart has a nondisplaced focal PMI. Regular rate and rhythm. No murmur, gallop or rub.  Lungs: Clear, no rales or wheezes. Equal expansion is noted.   Extremities: Show no edema.  Skin: Warm and dry.  Neurologic: Nonfocal.     Diagnostic Data (reviewed with patient):    Lab date: 6/7/2021  • FLP: TC 86, , HDL 32, LDL 31.4  • CMP: Glu 96, BUN 24, Creat 1.64, eGFR 41, Na 141, K 4.9, Cl 104, CO2 23, Ca 9.8, Alk Phos 94, AST 23, ALT 16  • CBC: WBC 13.66, RBC 4.73, HGB 13.3, HCT 43.1, MCV 91, MCH 28.1,       Lab Results   Component Value Date    GLUCOSE 132 (H) 05/27/2021    BUN 57 (H) 05/27/2021    CREATININE 2.03 (H) 05/27/2021    EGFRIFNONA 32 (L) 05/27/2021    BCR 28.1 (H) 05/27/2021     (L) 05/27/2021    K 4.4 05/27/2021     05/27/2021    CO2 19.0 (L) 05/27/2021    CALCIUM 8.8 05/27/2021    ALBUMIN 3.90 05/24/2021    ALKPHOS 88 05/24/2021    AST 90 (H) 05/24/2021    ALT 36 05/24/2021     Lab Results   Component Value Date    CHOL 176 05/25/2021    TRIG 212 (H) 05/25/2021    HDL 34 (L) 05/25/2021     (H) 05/25/2021      Lab Results   Component Value Date    WBC 12.85 (H) 05/26/2021    RBC 3.40 (L) 05/26/2021    HGB 10.7 (L) 05/26/2021    HCT 31.3 (L) 05/26/2021    MCV 92.1 05/26/2021     05/26/2021      Lab Results   Component Value Date    TSH 1.290 05/24/2021        Procedures      Assessment:    ICD-10-CM ICD-9-CM   1. Coronary artery disease involving native coronary artery of native heart without angina pectoris  I25.10 414.01   2. Essential " hypertension  I10 401.9   3. Dyslipidemia  E78.5 272.4         Plan:  1. Patient was counseled to begin aerobic exercise 30 min per day for at least 4 days per week.   2. Gave order for FLP, CMP to be done tomorrow.   3. Continue on aspirin 81 mg and Plavix 75 mg daily for DAPT and nitroglycerin 0.4 mg as needed for angina.   4. Continue on amlodipine 2.5 mg, lisinopril 2.5 mg, and metoprolol 12.5 mg for hypertension.   5. Continue on rosuvastatin 10 mg for hyperlipidemia.   6. Continue all other current medications.  7. F/up in 8 months, sooner if needed.      Electronically signed by HERMELINDA Carlisle, 01/27/22, 9:07 AM EST.

## 2022-01-27 ENCOUNTER — OFFICE VISIT (OUTPATIENT)
Dept: CARDIOLOGY | Facility: CLINIC | Age: 82
End: 2022-01-27

## 2022-01-27 VITALS
BODY MASS INDEX: 30.16 KG/M2 | SYSTOLIC BLOOD PRESSURE: 140 MMHG | HEIGHT: 74 IN | HEART RATE: 93 BPM | WEIGHT: 235 LBS | OXYGEN SATURATION: 96 % | DIASTOLIC BLOOD PRESSURE: 72 MMHG

## 2022-01-27 DIAGNOSIS — E78.5 DYSLIPIDEMIA: ICD-10-CM

## 2022-01-27 DIAGNOSIS — I10 ESSENTIAL HYPERTENSION: ICD-10-CM

## 2022-01-27 DIAGNOSIS — I25.10 CORONARY ARTERY DISEASE INVOLVING NATIVE CORONARY ARTERY OF NATIVE HEART WITHOUT ANGINA PECTORIS: Primary | ICD-10-CM

## 2022-01-27 PROCEDURE — 99213 OFFICE O/P EST LOW 20 MIN: CPT | Performed by: NURSE PRACTITIONER

## 2022-01-27 RX ORDER — BLOOD SUGAR DIAGNOSTIC
STRIP MISCELLANEOUS
Status: ON HOLD | COMMUNITY
Start: 2021-12-22 | End: 2022-09-06

## 2022-01-27 RX ORDER — LISINOPRIL 2.5 MG/1
2.5 TABLET ORAL
Qty: 30 TABLET | Refills: 11 | Status: SHIPPED | OUTPATIENT
Start: 2022-01-27 | End: 2022-09-14 | Stop reason: HOSPADM

## 2022-01-27 RX ORDER — CLOPIDOGREL BISULFATE 75 MG/1
75 TABLET ORAL DAILY
Qty: 30 TABLET | Refills: 11 | Status: SHIPPED | OUTPATIENT
Start: 2022-01-27

## 2022-01-27 RX ORDER — ROSUVASTATIN CALCIUM 10 MG/1
10 TABLET, COATED ORAL NIGHTLY
Qty: 30 TABLET | Refills: 11 | Status: SHIPPED | OUTPATIENT
Start: 2022-01-27

## 2022-01-27 RX ORDER — PEN NEEDLE, DIABETIC 31 GX5/16"
NEEDLE, DISPOSABLE MISCELLANEOUS
Status: ON HOLD | COMMUNITY
Start: 2022-01-14 | End: 2022-09-06

## 2022-01-27 RX ORDER — CAPSAICIN 0.025 %
1 CREAM (GRAM) TOPICAL 2 TIMES DAILY PRN
COMMUNITY
Start: 2022-09-16

## 2022-01-27 RX ORDER — AMLODIPINE BESYLATE 2.5 MG/1
2.5 TABLET ORAL DAILY
Qty: 30 TABLET | Refills: 11 | Status: SHIPPED | OUTPATIENT
Start: 2022-01-27

## 2022-01-27 RX ORDER — METOPROLOL SUCCINATE 25 MG/1
12.5 TABLET, EXTENDED RELEASE ORAL
Qty: 30 TABLET | Refills: 11 | Status: SHIPPED | OUTPATIENT
Start: 2022-01-27

## 2022-09-05 ENCOUNTER — HOSPITAL ENCOUNTER (OUTPATIENT)
Facility: HOSPITAL | Age: 82
LOS: 1 days | Discharge: HOME | End: 2022-09-14
Attending: EMERGENCY MEDICINE | Admitting: INTERNAL MEDICINE

## 2022-09-05 ENCOUNTER — APPOINTMENT (OUTPATIENT)
Dept: GENERAL RADIOLOGY | Facility: HOSPITAL | Age: 82
End: 2022-09-05

## 2022-09-05 ENCOUNTER — APPOINTMENT (OUTPATIENT)
Dept: CT IMAGING | Facility: HOSPITAL | Age: 82
End: 2022-09-05

## 2022-09-05 DIAGNOSIS — N39.0 ACUTE UTI (URINARY TRACT INFECTION): ICD-10-CM

## 2022-09-05 DIAGNOSIS — R06.00 DYSPNEA AND RESPIRATORY ABNORMALITIES: Primary | ICD-10-CM

## 2022-09-05 DIAGNOSIS — R53.1 WEAKNESS: ICD-10-CM

## 2022-09-05 DIAGNOSIS — E11.65 TYPE 2 DIABETES MELLITUS WITH HYPERGLYCEMIA, WITH LONG-TERM CURRENT USE OF INSULIN: ICD-10-CM

## 2022-09-05 DIAGNOSIS — R06.89 DYSPNEA AND RESPIRATORY ABNORMALITIES: Primary | ICD-10-CM

## 2022-09-05 DIAGNOSIS — N28.9 RENAL INSUFFICIENCY: ICD-10-CM

## 2022-09-05 DIAGNOSIS — J44.9 CHRONIC OBSTRUCTIVE PULMONARY DISEASE, UNSPECIFIED COPD TYPE: ICD-10-CM

## 2022-09-05 DIAGNOSIS — N18.30 ACUTE RENAL FAILURE SUPERIMPOSED ON STAGE 3 CHRONIC KIDNEY DISEASE, UNSPECIFIED ACUTE RENAL FAILURE TYPE, UNSPECIFIED WHETHER STAGE 3A OR 3B CKD: ICD-10-CM

## 2022-09-05 DIAGNOSIS — R13.19 ESOPHAGEAL DYSPHAGIA: ICD-10-CM

## 2022-09-05 DIAGNOSIS — Z79.4 TYPE 2 DIABETES MELLITUS WITH HYPERGLYCEMIA, WITH LONG-TERM CURRENT USE OF INSULIN: ICD-10-CM

## 2022-09-05 DIAGNOSIS — R05.9 COUGH: ICD-10-CM

## 2022-09-05 DIAGNOSIS — R13.10 ODYNOPHAGIA: ICD-10-CM

## 2022-09-05 DIAGNOSIS — N17.9 ACUTE RENAL FAILURE SUPERIMPOSED ON STAGE 3 CHRONIC KIDNEY DISEASE, UNSPECIFIED ACUTE RENAL FAILURE TYPE, UNSPECIFIED WHETHER STAGE 3A OR 3B CKD: ICD-10-CM

## 2022-09-05 DIAGNOSIS — R73.9 HYPERGLYCEMIA: ICD-10-CM

## 2022-09-05 DIAGNOSIS — D72.825 BANDEMIA: ICD-10-CM

## 2022-09-05 PROBLEM — J18.9 PNEUMONIA: Status: ACTIVE | Noted: 2022-09-05

## 2022-09-05 PROBLEM — E87.5 HYPERKALEMIA: Status: ACTIVE | Noted: 2022-09-05

## 2022-09-05 PROBLEM — A41.9 SEPSIS: Status: ACTIVE | Noted: 2022-09-05

## 2022-09-05 PROBLEM — I25.10 CAD (CORONARY ARTERY DISEASE): Status: ACTIVE | Noted: 2022-09-05

## 2022-09-05 LAB
ALBUMIN SERPL-MCNC: 3.5 G/DL (ref 3.5–5.2)
ALBUMIN/GLOB SERPL: 0.7 G/DL
ALP SERPL-CCNC: 130 U/L (ref 39–117)
ALT SERPL W P-5'-P-CCNC: 29 U/L (ref 1–41)
ANION GAP SERPL CALCULATED.3IONS-SCNC: 19 MMOL/L (ref 5–15)
ARTERIAL PATENCY WRIST A: POSITIVE
AST SERPL-CCNC: 35 U/L (ref 1–40)
ATMOSPHERIC PRESS: ABNORMAL MM[HG]
B-OH-BUTYR SERPL-SCNC: 1.37 MMOL/L (ref 0.02–0.27)
BACTERIA UR QL AUTO: ABNORMAL /HPF
BASE EXCESS BLDA CALC-SCNC: -4 MMOL/L (ref 0–2)
BASOPHILS # BLD AUTO: 0.05 10*3/MM3 (ref 0–0.2)
BASOPHILS NFR BLD AUTO: 0.2 % (ref 0–1.5)
BDY SITE: ABNORMAL
BILIRUB SERPL-MCNC: 0.4 MG/DL (ref 0–1.2)
BILIRUB UR QL STRIP: NEGATIVE
BODY TEMPERATURE: 37 C
BUN SERPL-MCNC: 83 MG/DL (ref 8–23)
BUN/CREAT SERPL: 25.3 (ref 7–25)
CALCIUM SPEC-SCNC: 9.8 MG/DL (ref 8.6–10.5)
CHLORIDE SERPL-SCNC: 84 MMOL/L (ref 98–107)
CLARITY UR: ABNORMAL
CO2 BLDA-SCNC: 21.5 MMOL/L (ref 22–33)
CO2 SERPL-SCNC: 19 MMOL/L (ref 22–29)
COHGB MFR BLD: 1 % (ref 0–2)
COLOR UR: YELLOW
CREAT SERPL-MCNC: 3.28 MG/DL (ref 0.76–1.27)
D-LACTATE SERPL-SCNC: 1.9 MMOL/L (ref 0.5–2)
DEPRECATED RDW RBC AUTO: 41.4 FL (ref 37–54)
EGFRCR SERPLBLD CKD-EPI 2021: 18 ML/MIN/1.73
EOSINOPHIL # BLD AUTO: 0.04 10*3/MM3 (ref 0–0.4)
EOSINOPHIL NFR BLD AUTO: 0.2 % (ref 0.3–6.2)
EPAP: 0
ERYTHROCYTE [DISTWIDTH] IN BLOOD BY AUTOMATED COUNT: 13.2 % (ref 12.3–15.4)
FLUAV SUBTYP SPEC NAA+PROBE: NOT DETECTED
FLUBV RNA ISLT QL NAA+PROBE: NOT DETECTED
GLOBULIN UR ELPH-MCNC: 5.2 GM/DL
GLUCOSE BLDC GLUCOMTR-MCNC: 461 MG/DL (ref 70–130)
GLUCOSE BLDC GLUCOMTR-MCNC: 549 MG/DL (ref 70–130)
GLUCOSE SERPL-MCNC: 578 MG/DL (ref 65–99)
GLUCOSE SERPL-MCNC: 664 MG/DL (ref 65–99)
GLUCOSE UR STRIP-MCNC: ABNORMAL MG/DL
HCO3 BLDA-SCNC: 20.4 MMOL/L (ref 20–26)
HCT VFR BLD AUTO: 35.7 % (ref 37.5–51)
HCT VFR BLD CALC: 36.2 % (ref 38–51)
HGB BLD-MCNC: 12.3 G/DL (ref 13–17.7)
HGB BLDA-MCNC: 11.8 G/DL (ref 13.5–17.5)
HGB UR QL STRIP.AUTO: ABNORMAL
HOLD SPECIMEN: NORMAL
HYALINE CASTS UR QL AUTO: ABNORMAL /LPF
IMM GRANULOCYTES # BLD AUTO: 0.29 10*3/MM3 (ref 0–0.05)
IMM GRANULOCYTES NFR BLD AUTO: 1.3 % (ref 0–0.5)
INHALED O2 CONCENTRATION: 21 %
IPAP: 0
KETONES UR QL STRIP: NEGATIVE
LEUKOCYTE ESTERASE UR QL STRIP.AUTO: ABNORMAL
LYMPHOCYTES # BLD AUTO: 1.65 10*3/MM3 (ref 0.7–3.1)
LYMPHOCYTES NFR BLD AUTO: 7.2 % (ref 19.6–45.3)
MAGNESIUM SERPL-MCNC: 1.9 MG/DL (ref 1.6–2.4)
MCH RBC QN AUTO: 30.8 PG (ref 26.6–33)
MCHC RBC AUTO-ENTMCNC: 34.5 G/DL (ref 31.5–35.7)
MCV RBC AUTO: 89.3 FL (ref 79–97)
METHGB BLD QL: -0.1 % (ref 0–1.5)
MODALITY: ABNORMAL
MONOCYTES # BLD AUTO: 1.72 10*3/MM3 (ref 0.1–0.9)
MONOCYTES NFR BLD AUTO: 7.5 % (ref 5–12)
NEUTROPHILS NFR BLD AUTO: 19.05 10*3/MM3 (ref 1.7–7)
NEUTROPHILS NFR BLD AUTO: 83.6 % (ref 42.7–76)
NITRITE UR QL STRIP: NEGATIVE
NOTE: ABNORMAL
NRBC BLD AUTO-RTO: 0 /100 WBC (ref 0–0.2)
NT-PROBNP SERPL-MCNC: 1048 PG/ML (ref 0–1800)
OXYHGB MFR BLDV: 94.9 % (ref 94–99)
PAW @ PEAK INSP FLOW SETTING VENT: 0 CMH2O
PCO2 BLDA: 34.3 MM HG (ref 35–45)
PCO2 TEMP ADJ BLD: 34.3 MM HG (ref 35–48)
PH BLDA: 7.38 PH UNITS (ref 7.35–7.45)
PH UR STRIP.AUTO: 5.5 [PH] (ref 5–8)
PH, TEMP CORRECTED: 7.38 PH UNITS
PHOSPHATE SERPL-MCNC: 3.7 MG/DL (ref 2.5–4.5)
PLATELET # BLD AUTO: 374 10*3/MM3 (ref 140–450)
PMV BLD AUTO: 10.5 FL (ref 6–12)
PO2 BLDA: 76.4 MM HG (ref 83–108)
PO2 TEMP ADJ BLD: 76.4 MM HG (ref 83–108)
POTASSIUM SERPL-SCNC: 5.3 MMOL/L (ref 3.5–5.2)
PROCALCITONIN SERPL-MCNC: 1.32 NG/ML (ref 0–0.25)
PROT SERPL-MCNC: 8.7 G/DL (ref 6–8.5)
PROT UR QL STRIP: ABNORMAL
RBC # BLD AUTO: 4 10*6/MM3 (ref 4.14–5.8)
RBC # UR STRIP: ABNORMAL /HPF
REF LAB TEST METHOD: ABNORMAL
SARS-COV-2 RNA PNL SPEC NAA+PROBE: NOT DETECTED
SODIUM SERPL-SCNC: 122 MMOL/L (ref 136–145)
SP GR UR STRIP: 1.02 (ref 1–1.03)
SQUAMOUS #/AREA URNS HPF: ABNORMAL /HPF
TOTAL RATE: 0 BREATHS/MINUTE
TROPONIN T SERPL-MCNC: <0.01 NG/ML (ref 0–0.03)
UROBILINOGEN UR QL STRIP: ABNORMAL
WBC # UR STRIP: ABNORMAL /HPF
WBC NRBC COR # BLD: 22.8 10*3/MM3 (ref 3.4–10.8)
WHOLE BLOOD HOLD COAG: NORMAL
WHOLE BLOOD HOLD SPECIMEN: NORMAL

## 2022-09-05 PROCEDURE — 99220 PR INITIAL OBSERVATION CARE/DAY 70 MINUTES: CPT | Performed by: FAMILY MEDICINE

## 2022-09-05 PROCEDURE — 87086 URINE CULTURE/COLONY COUNT: CPT | Performed by: NURSE PRACTITIONER

## 2022-09-05 PROCEDURE — 83605 ASSAY OF LACTIC ACID: CPT | Performed by: EMERGENCY MEDICINE

## 2022-09-05 PROCEDURE — 87636 SARSCOV2 & INF A&B AMP PRB: CPT | Performed by: EMERGENCY MEDICINE

## 2022-09-05 PROCEDURE — 71045 X-RAY EXAM CHEST 1 VIEW: CPT

## 2022-09-05 PROCEDURE — 87088 URINE BACTERIA CULTURE: CPT | Performed by: NURSE PRACTITIONER

## 2022-09-05 PROCEDURE — 25010000002 SODIUM CHLORIDE 0.9 % WITH KCL 20 MEQ 20-0.9 MEQ/L-% SOLUTION: Performed by: NURSE PRACTITIONER

## 2022-09-05 PROCEDURE — 80053 COMPREHEN METABOLIC PANEL: CPT | Performed by: EMERGENCY MEDICINE

## 2022-09-05 PROCEDURE — 81001 URINALYSIS AUTO W/SCOPE: CPT | Performed by: NURSE PRACTITIONER

## 2022-09-05 PROCEDURE — 87040 BLOOD CULTURE FOR BACTERIA: CPT | Performed by: EMERGENCY MEDICINE

## 2022-09-05 PROCEDURE — 93005 ELECTROCARDIOGRAM TRACING: CPT | Performed by: EMERGENCY MEDICINE

## 2022-09-05 PROCEDURE — 25010000002 PIPERACILLIN SOD-TAZOBACTAM PER 1 G: Performed by: EMERGENCY MEDICINE

## 2022-09-05 PROCEDURE — 99284 EMERGENCY DEPT VISIT MOD MDM: CPT

## 2022-09-05 PROCEDURE — 83930 ASSAY OF BLOOD OSMOLALITY: CPT | Performed by: NURSE PRACTITIONER

## 2022-09-05 PROCEDURE — 84100 ASSAY OF PHOSPHORUS: CPT | Performed by: NURSE PRACTITIONER

## 2022-09-05 PROCEDURE — 96367 TX/PROPH/DG ADDL SEQ IV INF: CPT

## 2022-09-05 PROCEDURE — 82375 ASSAY CARBOXYHB QUANT: CPT

## 2022-09-05 PROCEDURE — 82947 ASSAY GLUCOSE BLOOD QUANT: CPT

## 2022-09-05 PROCEDURE — 36600 WITHDRAWAL OF ARTERIAL BLOOD: CPT

## 2022-09-05 PROCEDURE — 96365 THER/PROPH/DIAG IV INF INIT: CPT

## 2022-09-05 PROCEDURE — 71250 CT THORAX DX C-: CPT

## 2022-09-05 PROCEDURE — 87186 SC STD MICRODIL/AGAR DIL: CPT | Performed by: NURSE PRACTITIONER

## 2022-09-05 PROCEDURE — 83880 ASSAY OF NATRIURETIC PEPTIDE: CPT | Performed by: EMERGENCY MEDICINE

## 2022-09-05 PROCEDURE — 25010000002 VANCOMYCIN 10 G RECONSTITUTED SOLUTION: Performed by: EMERGENCY MEDICINE

## 2022-09-05 PROCEDURE — 36415 COLL VENOUS BLD VENIPUNCTURE: CPT

## 2022-09-05 PROCEDURE — 83050 HGB METHEMOGLOBIN QUAN: CPT

## 2022-09-05 PROCEDURE — 82805 BLOOD GASES W/O2 SATURATION: CPT

## 2022-09-05 PROCEDURE — 84145 PROCALCITONIN (PCT): CPT | Performed by: EMERGENCY MEDICINE

## 2022-09-05 PROCEDURE — 84484 ASSAY OF TROPONIN QUANT: CPT | Performed by: EMERGENCY MEDICINE

## 2022-09-05 PROCEDURE — 63710000001 INSULIN LISPRO (HUMAN) PER 5 UNITS: Performed by: FAMILY MEDICINE

## 2022-09-05 PROCEDURE — 85025 COMPLETE CBC W/AUTO DIFF WBC: CPT | Performed by: EMERGENCY MEDICINE

## 2022-09-05 PROCEDURE — 83735 ASSAY OF MAGNESIUM: CPT | Performed by: NURSE PRACTITIONER

## 2022-09-05 PROCEDURE — 83036 HEMOGLOBIN GLYCOSYLATED A1C: CPT | Performed by: NURSE PRACTITIONER

## 2022-09-05 PROCEDURE — 82010 KETONE BODYS QUAN: CPT | Performed by: NURSE PRACTITIONER

## 2022-09-05 RX ORDER — POTASSIUM CHLORIDE, DEXTROSE MONOHYDRATE AND SODIUM CHLORIDE 300; 5; 900 MG/100ML; G/100ML; MG/100ML
150 INJECTION, SOLUTION INTRAVENOUS CONTINUOUS PRN
Status: DISCONTINUED | OUTPATIENT
Start: 2022-09-05 | End: 2022-09-06

## 2022-09-05 RX ORDER — SODIUM CHLORIDE 0.9 % (FLUSH) 0.9 %
10 SYRINGE (ML) INJECTION EVERY 12 HOURS SCHEDULED
Status: DISCONTINUED | OUTPATIENT
Start: 2022-09-05 | End: 2022-09-05 | Stop reason: SDUPTHER

## 2022-09-05 RX ORDER — DEXTROSE, SODIUM CHLORIDE, AND POTASSIUM CHLORIDE 5; .9; .15 G/100ML; G/100ML; G/100ML
150 INJECTION INTRAVENOUS CONTINUOUS PRN
Status: DISCONTINUED | OUTPATIENT
Start: 2022-09-05 | End: 2022-09-06

## 2022-09-05 RX ORDER — SODIUM CHLORIDE 0.9 % (FLUSH) 0.9 %
10 SYRINGE (ML) INJECTION AS NEEDED
Status: DISCONTINUED | OUTPATIENT
Start: 2022-09-05 | End: 2022-09-05 | Stop reason: SDUPTHER

## 2022-09-05 RX ORDER — CLOPIDOGREL BISULFATE 75 MG/1
75 TABLET ORAL DAILY
Status: DISCONTINUED | OUTPATIENT
Start: 2022-09-06 | End: 2022-09-14 | Stop reason: HOSPADM

## 2022-09-05 RX ORDER — DEXTROSE AND SODIUM CHLORIDE 5; .45 G/100ML; G/100ML
150 INJECTION, SOLUTION INTRAVENOUS CONTINUOUS PRN
Status: DISCONTINUED | OUTPATIENT
Start: 2022-09-05 | End: 2022-09-06

## 2022-09-05 RX ORDER — ROSUVASTATIN CALCIUM 10 MG/1
10 TABLET, COATED ORAL NIGHTLY
Status: DISCONTINUED | OUTPATIENT
Start: 2022-09-05 | End: 2022-09-14 | Stop reason: HOSPADM

## 2022-09-05 RX ORDER — ACETAMINOPHEN 160 MG/5ML
650 SOLUTION ORAL EVERY 4 HOURS PRN
Status: DISCONTINUED | OUTPATIENT
Start: 2022-09-05 | End: 2022-09-14 | Stop reason: HOSPADM

## 2022-09-05 RX ORDER — AMLODIPINE BESYLATE 2.5 MG/1
2.5 TABLET ORAL DAILY
Status: DISCONTINUED | OUTPATIENT
Start: 2022-09-06 | End: 2022-09-14 | Stop reason: HOSPADM

## 2022-09-05 RX ORDER — SODIUM CHLORIDE 450 MG/100ML
250 INJECTION, SOLUTION INTRAVENOUS CONTINUOUS PRN
Status: DISCONTINUED | OUTPATIENT
Start: 2022-09-05 | End: 2022-09-06

## 2022-09-05 RX ORDER — TIMOLOL MALEATE 5 MG/ML
1 SOLUTION/ DROPS OPHTHALMIC 2 TIMES DAILY
Status: DISCONTINUED | OUTPATIENT
Start: 2022-09-05 | End: 2022-09-14 | Stop reason: HOSPADM

## 2022-09-05 RX ORDER — SODIUM CHLORIDE AND POTASSIUM CHLORIDE 150; 450 MG/100ML; MG/100ML
250 INJECTION, SOLUTION INTRAVENOUS CONTINUOUS PRN
Status: DISCONTINUED | OUTPATIENT
Start: 2022-09-05 | End: 2022-09-06

## 2022-09-05 RX ORDER — CETIRIZINE HYDROCHLORIDE 10 MG/1
10 TABLET ORAL DAILY
Status: DISCONTINUED | OUTPATIENT
Start: 2022-09-06 | End: 2022-09-14 | Stop reason: HOSPADM

## 2022-09-05 RX ORDER — DEXTROSE MONOHYDRATE 25 G/50ML
10-50 INJECTION, SOLUTION INTRAVENOUS
Status: DISCONTINUED | OUTPATIENT
Start: 2022-09-05 | End: 2022-09-14 | Stop reason: HOSPADM

## 2022-09-05 RX ORDER — LATANOPROST 50 UG/ML
1 SOLUTION/ DROPS OPHTHALMIC NIGHTLY
Status: DISCONTINUED | OUTPATIENT
Start: 2022-09-05 | End: 2022-09-14 | Stop reason: HOSPADM

## 2022-09-05 RX ORDER — ALBUTEROL SULFATE 2.5 MG/3ML
2.5 SOLUTION RESPIRATORY (INHALATION) EVERY 4 HOURS PRN
Status: DISCONTINUED | OUTPATIENT
Start: 2022-09-05 | End: 2022-09-14 | Stop reason: HOSPADM

## 2022-09-05 RX ORDER — SODIUM CHLORIDE 0.9 % (FLUSH) 0.9 %
10 SYRINGE (ML) INJECTION EVERY 12 HOURS SCHEDULED
Status: DISCONTINUED | OUTPATIENT
Start: 2022-09-05 | End: 2022-09-14 | Stop reason: HOSPADM

## 2022-09-05 RX ORDER — DEXTROSE, SODIUM CHLORIDE, AND POTASSIUM CHLORIDE 5; .45; .3 G/100ML; G/100ML; G/100ML
150 INJECTION INTRAVENOUS CONTINUOUS PRN
Status: DISCONTINUED | OUTPATIENT
Start: 2022-09-05 | End: 2022-09-06

## 2022-09-05 RX ORDER — ACETAMINOPHEN 325 MG/1
650 TABLET ORAL EVERY 4 HOURS PRN
Status: DISCONTINUED | OUTPATIENT
Start: 2022-09-05 | End: 2022-09-14 | Stop reason: HOSPADM

## 2022-09-05 RX ORDER — FAMOTIDINE 20 MG/1
20 TABLET, FILM COATED ORAL 2 TIMES DAILY PRN
Status: DISCONTINUED | OUTPATIENT
Start: 2022-09-05 | End: 2022-09-14 | Stop reason: HOSPADM

## 2022-09-05 RX ORDER — ASPIRIN 81 MG/1
81 TABLET ORAL DAILY
Status: DISCONTINUED | OUTPATIENT
Start: 2022-09-06 | End: 2022-09-14 | Stop reason: HOSPADM

## 2022-09-05 RX ORDER — DEXTROSE AND SODIUM CHLORIDE 5; .9 G/100ML; G/100ML
150 INJECTION, SOLUTION INTRAVENOUS CONTINUOUS PRN
Status: DISCONTINUED | OUTPATIENT
Start: 2022-09-05 | End: 2022-09-06

## 2022-09-05 RX ORDER — ACETAMINOPHEN 650 MG/1
650 SUPPOSITORY RECTAL EVERY 4 HOURS PRN
Status: DISCONTINUED | OUTPATIENT
Start: 2022-09-05 | End: 2022-09-14 | Stop reason: HOSPADM

## 2022-09-05 RX ORDER — SODIUM CHLORIDE 9 MG/ML
250 INJECTION, SOLUTION INTRAVENOUS CONTINUOUS PRN
Status: DISCONTINUED | OUTPATIENT
Start: 2022-09-05 | End: 2022-09-06

## 2022-09-05 RX ORDER — SODIUM CHLORIDE AND POTASSIUM CHLORIDE 300; 900 MG/100ML; MG/100ML
250 INJECTION, SOLUTION INTRAVENOUS CONTINUOUS PRN
Status: DISCONTINUED | OUTPATIENT
Start: 2022-09-05 | End: 2022-09-06

## 2022-09-05 RX ORDER — HEPARIN SODIUM 5000 [USP'U]/ML
5000 INJECTION, SOLUTION INTRAVENOUS; SUBCUTANEOUS EVERY 12 HOURS SCHEDULED
Status: CANCELLED | OUTPATIENT
Start: 2022-09-05

## 2022-09-05 RX ORDER — METOPROLOL SUCCINATE 25 MG/1
12.5 TABLET, EXTENDED RELEASE ORAL
Status: DISCONTINUED | OUTPATIENT
Start: 2022-09-06 | End: 2022-09-14 | Stop reason: HOSPADM

## 2022-09-05 RX ORDER — FINASTERIDE 5 MG/1
5 TABLET, FILM COATED ORAL DAILY
Status: DISCONTINUED | OUTPATIENT
Start: 2022-09-06 | End: 2022-09-14 | Stop reason: HOSPADM

## 2022-09-05 RX ORDER — ACETAMINOPHEN 500 MG
500 TABLET ORAL EVERY 6 HOURS PRN
Status: DISCONTINUED | OUTPATIENT
Start: 2022-09-05 | End: 2022-09-06 | Stop reason: SDUPTHER

## 2022-09-05 RX ORDER — DEXTROSE, SODIUM CHLORIDE, AND POTASSIUM CHLORIDE 5; .45; .15 G/100ML; G/100ML; G/100ML
150 INJECTION INTRAVENOUS CONTINUOUS PRN
Status: DISCONTINUED | OUTPATIENT
Start: 2022-09-05 | End: 2022-09-06

## 2022-09-05 RX ORDER — SODIUM CHLORIDE 0.9 % (FLUSH) 0.9 %
10 SYRINGE (ML) INJECTION AS NEEDED
Status: DISCONTINUED | OUTPATIENT
Start: 2022-09-05 | End: 2022-09-06

## 2022-09-05 RX ORDER — INSULIN LISPRO 100 [IU]/ML
15 INJECTION, SOLUTION INTRAVENOUS; SUBCUTANEOUS ONCE
Status: COMPLETED | OUTPATIENT
Start: 2022-09-05 | End: 2022-09-05

## 2022-09-05 RX ORDER — NICOTINE POLACRILEX 4 MG
15 LOZENGE BUCCAL
Status: DISCONTINUED | OUTPATIENT
Start: 2022-09-05 | End: 2022-09-14 | Stop reason: HOSPADM

## 2022-09-05 RX ORDER — SODIUM CHLORIDE AND POTASSIUM CHLORIDE 150; 900 MG/100ML; MG/100ML
250 INJECTION, SOLUTION INTRAVENOUS CONTINUOUS PRN
Status: DISCONTINUED | OUTPATIENT
Start: 2022-09-05 | End: 2022-09-06

## 2022-09-05 RX ORDER — IPRATROPIUM BROMIDE AND ALBUTEROL SULFATE 2.5; .5 MG/3ML; MG/3ML
3 SOLUTION RESPIRATORY (INHALATION)
Status: DISCONTINUED | OUTPATIENT
Start: 2022-09-05 | End: 2022-09-12

## 2022-09-05 RX ADMIN — INSULIN HUMAN 8 UNITS/HR: 1 INJECTION, SOLUTION INTRAVENOUS at 23:41

## 2022-09-05 RX ADMIN — TAZOBACTAM SODIUM AND PIPERACILLIN SODIUM 3.38 G: 375; 3 INJECTION, SOLUTION INTRAVENOUS at 21:08

## 2022-09-05 RX ADMIN — POTASSIUM CHLORIDE AND SODIUM CHLORIDE 250 ML/HR: 900; 150 INJECTION, SOLUTION INTRAVENOUS at 23:42

## 2022-09-05 RX ADMIN — VANCOMYCIN HYDROCHLORIDE 2000 MG: 10 INJECTION, POWDER, LYOPHILIZED, FOR SOLUTION INTRAVENOUS at 22:20

## 2022-09-05 RX ADMIN — SODIUM CHLORIDE 1000 ML: 9 INJECTION, SOLUTION INTRAVENOUS at 21:06

## 2022-09-05 RX ADMIN — INSULIN LISPRO 15 UNITS: 100 INJECTION, SOLUTION INTRAVENOUS; SUBCUTANEOUS at 21:13

## 2022-09-05 NOTE — ED PROVIDER NOTES
[facility]    EMERGENCY DEPARTMENT ENCOUNTER      Pt Name: Pro Pal  MRN: [id number]  YOB: 1940  Date of evaluation: 9/5/2022  Provider: Baltazar Castaneda DO    CHIEF COMPLAINT       Chief Complaint   Patient presents with   • Shortness of Breath         HISTORY OF PRESENT ILLNESS  (Location/Symptom, Timing/Onset, Context/Setting, Quality, Duration, Modifying Factors, Severity.)   Pro Pal is a 82 y.o. male who presents to the emergency department for evaluation of intermittent shortness of breath, cough, congestion over the last 1 week.  He notes generalized weakness, overall just not feeling well.  Patient states has not had any sputum production, no hemoptysis, states she just in general he has had some mild dyspnea with exertion, has underlying history of COPD, wears a CPAP at night.  Denies any recent infectious process, no history of recent pneumonia.  Does not wear supplemental oxygen.  Denies any peripheral edema, no chest pain.  Does state that he does not check his sugar regularly, takes his diabetes medications though.  He does admit to history of poorly controlled blood sugars in the past.  Denies any other acute systemic complaints at this time.      Nursing notes were reviewed.    REVIEW OF SYSTEMS    (2-9 systems for level 4, 10 or more for level 5)   ROS:  General:  No fevers, + chills, + weakness  Cardiovascular:  No chest pain, no palpitations  Respiratory:  + shortness of breath, + cough, no wheezing  Gastrointestinal:  No pain, no nausea, no vomiting, no diarrhea  Musculoskeletal:  No muscle pain, no joint pain  Skin:  No rash  Neurologic:  No headache, no extremity numbness, no extremity tingling, no extremity weakness  Psychiatric:  No anxiety  Genitourinary:  No dysuria, no hematuria    Except as noted above the remainder of the review of systems was reviewed and negative.       PAST MEDICAL HISTORY     Past Medical History:   Diagnosis Date   • COPD (chronic  obstructive pulmonary disease) (HCC)    • Diabetes mellitus (HCC)    • Sleep apnea          SURGICAL HISTORY       Past Surgical History:   Procedure Laterality Date   • CARDIAC CATHETERIZATION N/A 5/24/2021    Procedure: Left Heart Cath;  Surgeon: Elisha Bettencourt MD;  Location: [facility] CATH INVASIVE LOCATION;  Service: Cardiology;  Laterality: N/A;         CURRENT MEDICATIONS       Current Facility-Administered Medications:   •  piperacillin-tazobactam (ZOSYN) 3.375 g in iso-osmotic dextrose 50 ml (premix), 3.375 g, Intravenous, Once, Baltazar Castaneda DO, Last Rate: 100 mL/hr at 09/05/22 2108, 3.375 g at 09/05/22 2108  •  sodium chloride 0.9 % bolus 1,000 mL, 1,000 mL, Intravenous, Once, Dagmar, Ciara, APRN, Last Rate: 1,000 mL/hr at 09/05/22 2106, 1,000 mL at 09/05/22 2106  •  sodium chloride 0.9 % flush 10 mL, 10 mL, Intravenous, PRN, Baltazar Castaneda DO  •  vancomycin 2000 mg/500 mL 0.9% NS IVPB ([facility]), 20 mg/kg, Intravenous, Once, Baltazar Castaneda DO    Current Outpatient Medications:   •  Accu-Chek Guide test strip, , Disp: , Rfl:   •  acetaminophen (TYLENOL) 500 MG tablet, Take 500 mg by mouth Every 6 (Six) Hours As Needed for Mild Pain ., Disp: , Rfl:   •  albuterol sulfate  (90 Base) MCG/ACT inhaler, Inhale 2 puffs Every 4 (Four) Hours As Needed for Wheezing., Disp: , Rfl:   •  amLODIPine (NORVASC) 2.5 MG tablet, Take 1 tablet by mouth Daily., Disp: 30 tablet, Rfl: 11  •  aspirin (aspirin) 81 MG EC tablet, Take 81 mg by mouth Daily., Disp: , Rfl:   •  B-D ULTRAFINE III SHORT PEN 31G X 8 MM misc, , Disp: , Rfl:   •  capsaicin (ZOSTRIX) 0.025 % cream, Apply 1 application topically to the appropriate area as directed., Disp: , Rfl:   •  clopidogrel (PLAVIX) 75 MG tablet, Take 1 tablet by mouth Daily., Disp: 30 tablet, Rfl: 11  •  famotidine (PEPCID) 20 MG tablet, Take 20 mg by mouth 2 (Two) Times a Day As Needed for Heartburn., Disp: , Rfl:   •  finasteride (PROSCAR) 5 MG  tablet, Take 5 mg by mouth Daily., Disp: , Rfl:   •  insulin aspart (novoLOG) 100 UNIT/ML injection, Inject 35 Units under the skin into the appropriate area as directed 3 (Three) Times a Day Before Meals., Disp: , Rfl:   •  insulin glargine (LANTUS, SEMGLEE) 100 UNIT/ML injection, Inject 50 Units under the skin into the appropriate area as directed 2 (Two) Times a Day., Disp: , Rfl:   •  latanoprost (XALATAN) 0.005 % ophthalmic solution, Administer 1 drop to both eyes Every Night., Disp: , Rfl:   •  lisinopril (PRINIVIL,ZESTRIL) 2.5 MG tablet, Take 1 tablet by mouth Daily., Disp: 30 tablet, Rfl: 11  •  loratadine (CLARITIN) 10 MG tablet, Take 10 mg by mouth As Needed for Allergies., Disp: , Rfl:   •  MAGNESIUM GLUCONATE PO, Take 400 mg by mouth Daily., Disp: , Rfl:   •  metoprolol succinate XL (TOPROL-XL) 25 MG 24 hr tablet, Take 0.5 tablets by mouth Daily., Disp: 30 tablet, Rfl: 11  •  nitroglycerin (NITROSTAT) 0.4 MG SL tablet, 1 under the tongue as needed for angina, may repeat q5mins for up three doses, Disp: 100 tablet, Rfl: 11  •  Omega-3 Fatty Acids (fish oil) 1000 MG capsule capsule, Take 1,000 mg by mouth 2 (Two) Times a Day With Meals., Disp: , Rfl:   •  pioglitazone (ACTOS) 30 MG tablet, Take 30 mg by mouth Daily., Disp: , Rfl:   •  rosuvastatin (CRESTOR) 10 MG tablet, Take 1 tablet by mouth Every Night., Disp: 30 tablet, Rfl: 11  •  timolol (TIMOPTIC) 0.5 % ophthalmic solution, Administer 1 drop to both eyes 2 (Two) Times a Day., Disp: , Rfl:   •  tiotropium bromide-olodaterol (Stiolto Respimat) 2.5-2.5 MCG/ACT aerosol solution inhaler, Inhale 2 puffs Daily., Disp: , Rfl:     ALLERGIES     Patient has no known allergies.    FAMILY HISTORY       Family History   Problem Relation Age of Onset   • Heart disease Father           SOCIAL HISTORY       Social History     Socioeconomic History   • Marital status:    Tobacco Use   • Smoking status: Former Smoker     Packs/day: 2.00     Types: Cigarettes    • Smokeless tobacco: Former User   • Tobacco comment: Quit 25 years ago   Vaping Use   • Vaping Use: Never used   Substance and Sexual Activity   • Alcohol use: Never   • Drug use: Never   • Sexual activity: Defer         PHYSICAL EXAM    (up to 7 for level 4, 8 or more for level 5)     Vitals:    09/05/22 2015 09/05/22 2029 09/05/22 2030 09/05/22 2100   BP:       BP Location:       Patient Position:       Pulse: 82 82 84 82   Resp:       Temp:       TempSrc:       SpO2: 95% 94% 93% 94%   Weight:       Height:           Physical Exam  General : Patient is awake, alert, oriented, in no acute distress, nontoxic appearing  HEENT: Pupils are equally round, EOMI, conjunctivae clear, there is no injection no icterus.  Oral mucosa is moist, uvula midline  Neck: Neck is supple, full range of motion, trachea midline  Cardiac: Heart regular rate, rhythm, no murmurs, rubs, or gallops  Lungs: Lungs are clear to auscultation, there is no wheezing, rhonchi, or rales. There is no use of accessory muscles.  Oxygen saturation 96% on room air.  No acute respiratory distress.  Chest wall: There is no tenderness to palpation over the chest wall or over ribs  Abdomen: Abdomen is soft, nontender, nondistended. There are no firm or pulsatile masses, no rebound rigidity or guarding  Musculoskeletal: 5 out of 5 strength in all 4 extremities.  No focal muscle deficits are appreciated  Neuro: Motor intact, sensory intact, level of consciousness is normal  Dermatology: Skin is warm and dry  Psych: Mentation is grossly normal, cognition is grossly normal. Affect is appropriate      DIAGNOSTIC RESULTS     EKG: All EKGs are interpreted by the Emergency Department Physician who either signs or Co-signs this chart in the absence of a cardiologist.    ECG 12 Lead   Preliminary Result   Test Reason : SOA Protocol   Blood Pressure :   */*   mmHG   Vent. Rate :  87 BPM     Atrial Rate :  87 BPM      P-R Int : 144 ms          QRS Dur : 112 ms       QT  Int : 374 ms       P-R-T Axes :  52 103  41 degrees      QTc Int : 450 ms      Normal sinus rhythm   Low voltage QRS   Right bundle branch block   Cannot rule out Inferior infarct (cited on or before 24-MAY-2021)   Abnormal ECG   When compared with ECG of 25-MAY-2021 04:31,   Right bundle branch block is now present      Referred By: EDMD           Confirmed By:           RADIOLOGY:   Non-plain film images such as CT, Ultrasound and MRI are read by the radiologist. Plain radiographic images are visualized and preliminarily interpreted by the emergency physician with the below findings:      [] Radiologist's Report Reviewed:  XR Chest 1 View   Final Result   Mild streaky bibasilar opacities are favored to reflect scarring or   atelectasis.       This report was finalized on 9/5/2022 7:49 PM by Migel Shaw MD.          CT Chest Without Contrast Diagnostic    (Results Pending)         ED BEDSIDE ULTRASOUND:   Performed by ED Physician - none    LABS:    I have reviewed and interpreted all of the currently available lab results from this visit (if applicable):  Results for orders placed or performed during the hospital encounter of 09/05/22   COVID-19 and FLU A/B PCR - Swab, Nasopharynx    Specimen: Nasopharynx; Swab   Result Value Ref Range    COVID19 Not Detected Not Detected - Ref. Range    Influenza A PCR Not Detected Not Detected    Influenza B PCR Not Detected Not Detected   Comprehensive Metabolic Panel    Specimen: Blood   Result Value Ref Range    Glucose 664 (C) 65 - 99 mg/dL    BUN 83 (H) 8 - 23 mg/dL    Creatinine 3.28 (H) 0.76 - 1.27 mg/dL    Sodium 122 (L) 136 - 145 mmol/L    Potassium 5.3 (H) 3.5 - 5.2 mmol/L    Chloride 84 (L) 98 - 107 mmol/L    CO2 19.0 (L) 22.0 - 29.0 mmol/L    Calcium 9.8 8.6 - 10.5 mg/dL    Total Protein 8.7 (H) 6.0 - 8.5 g/dL    Albumin 3.50 3.50 - 5.20 g/dL    ALT (SGPT) 29 1 - 41 U/L    AST (SGOT) 35 1 - 40 U/L    Alkaline Phosphatase 130 (H) 39 - 117 U/L    Total Bilirubin  0.4 0.0 - 1.2 mg/dL    Globulin 5.2 gm/dL    A/G Ratio 0.7 g/dL    BUN/Creatinine Ratio 25.3 (H) 7.0 - 25.0    Anion Gap 19.0 (H) 5.0 - 15.0 mmol/L    eGFR 18.0 (L) >60.0 mL/min/1.73   BNP    Specimen: Blood   Result Value Ref Range    proBNP 1,048.0 0.0 - 1,800.0 pg/mL   Troponin    Specimen: Blood   Result Value Ref Range    Troponin T <0.010 0.000 - 0.030 ng/mL   CBC Auto Differential    Specimen: Blood   Result Value Ref Range    WBC 22.80 (H) 3.40 - 10.80 10*3/mm3    RBC 4.00 (L) 4.14 - 5.80 10*6/mm3    Hemoglobin 12.3 (L) 13.0 - 17.7 g/dL    Hematocrit 35.7 (L) 37.5 - 51.0 %    MCV 89.3 79.0 - 97.0 fL    MCH 30.8 26.6 - 33.0 pg    MCHC 34.5 31.5 - 35.7 g/dL    RDW 13.2 12.3 - 15.4 %    RDW-SD 41.4 37.0 - 54.0 fl    MPV 10.5 6.0 - 12.0 fL    Platelets 374 140 - 450 10*3/mm3    Neutrophil % 83.6 (H) 42.7 - 76.0 %    Lymphocyte % 7.2 (L) 19.6 - 45.3 %    Monocyte % 7.5 5.0 - 12.0 %    Eosinophil % 0.2 (L) 0.3 - 6.2 %    Basophil % 0.2 0.0 - 1.5 %    Immature Grans % 1.3 (H) 0.0 - 0.5 %    Neutrophils, Absolute 19.05 (H) 1.70 - 7.00 10*3/mm3    Lymphocytes, Absolute 1.65 0.70 - 3.10 10*3/mm3    Monocytes, Absolute 1.72 (H) 0.10 - 0.90 10*3/mm3    Eosinophils, Absolute 0.04 0.00 - 0.40 10*3/mm3    Basophils, Absolute 0.05 0.00 - 0.20 10*3/mm3    Immature Grans, Absolute 0.29 (H) 0.00 - 0.05 10*3/mm3    nRBC 0.0 0.0 - 0.2 /100 WBC   Lactic Acid, Plasma    Specimen: Blood   Result Value Ref Range    Lactate 1.9 0.5 - 2.0 mmol/L   Procalcitonin    Specimen: Blood   Result Value Ref Range    Procalcitonin 1.32 (H) 0.00 - 0.25 ng/mL   Beta Hydroxybutyrate Quantitative    Specimen: Blood   Result Value Ref Range    Beta-Hydroxybutyrate Quant 1.367 (H) 0.020 - 0.270 mmol/L   Blood Gas, Arterial With Co-Ox    Specimen: Arterial Blood   Result Value Ref Range    Site Left Radial     Emil's Test Positive     pH, Arterial 7.384 7.350 - 7.450 pH units    pCO2, Arterial 34.3 (L) 35.0 - 45.0 mm Hg    pO2, Arterial 76.4 (L) 83.0  - 108.0 mm Hg    HCO3, Arterial 20.4 20.0 - 26.0 mmol/L    Base Excess, Arterial -4.0 (L) 0.0 - 2.0 mmol/L    Hemoglobin, Blood Gas 11.8 (L) 13.5 - 17.5 g/dL    Hematocrit, Blood Gas 36.2 (L) 38.0 - 51.0 %    Oxyhemoglobin 94.9 94 - 99 %    Methemoglobin -0.10 (L) 0.00 - 1.50 %    Carboxyhemoglobin 1.0 0 - 2 %    CO2 Content 21.5 (L) 22 - 33 mmol/L    Temperature 37.0 C    Barometric Pressure for Blood Gas      Modality Room Air     FIO2 21 %    Rate 0 Breaths/minute    PIP 0 cmH2O    IPAP 0     EPAP 0     Note      pH, Temp Corrected 7.384 pH Units    pCO2, Temperature Corrected 34.3 (L) 35 - 48 mm Hg    pO2, Temperature Corrected 76.4 (L) 83 - 108 mm Hg   ECG 12 Lead   Result Value Ref Range    QT Interval 374 ms    QTC Interval 450 ms   Green Top (Gel)   Result Value Ref Range    Extra Tube Hold for add-ons.    Lavender Top   Result Value Ref Range    Extra Tube hold for add-on    Gold Top - SST   Result Value Ref Range    Extra Tube Hold for add-ons.    Light Blue Top   Result Value Ref Range    Extra Tube Hold for add-ons.         All other labs were within normal range or not returned as of this dictation.      EMERGENCY DEPARTMENT COURSE and DIFFERENTIAL DIAGNOSIS/MDM:   Vitals:    Vitals:    09/05/22 2015 09/05/22 2029 09/05/22 2030 09/05/22 2100   BP:       BP Location:       Patient Position:       Pulse: 82 82 84 82   Resp:       Temp:       TempSrc:       SpO2: 95% 94% 93% 94%   Weight:       Height:                The patient with a history of COPD, diabetes presents with cough, congestion, shortness of breath just overall not feeling well for the last 7 to 10 days.  He is nontoxic-appearing, oxygen saturations 95% on room air at rest.  With continued symptoms so we obtained IV, labs, imaging for further evaluation.  Patient started on broad-spectrum antibiotics as elevated white count of 22,000, left shift.  Also started on IV fluids, blood pressure stable.  Patient was started on a IV fluids, does  have elevated glucose, likely elevated anion gap, no acidosis.  We will plan for glycemic control, treatment with broad-spectrum antibiotics for possible pneumonia, COPD exacerbation, plan for admission to the hospital for further work-up and treatment.  Case discussed with hospitalist, Dr. Préez, for admission.        MEDICATIONS ADMINISTERED IN ED:  Medications   sodium chloride 0.9 % flush 10 mL (has no administration in time range)   vancomycin 2000 mg/500 mL 0.9% NS IVPB ([facility]) (has no administration in time range)   piperacillin-tazobactam (ZOSYN) 3.375 g in iso-osmotic dextrose 50 ml (premix) (3.375 g Intravenous New Bag 9/5/22 2108)   sodium chloride 0.9 % bolus 1,000 mL (1,000 mL Intravenous New Bag 9/5/22 2106)   Insulin Lispro (humaLOG) injection 15 Units (15 Units Subcutaneous Given 9/5/22 2113)       PROCEDURES:  Procedures    CRITICAL CARE TIME    Total Critical Care time was 0 minutes, excluding separately reportable procedures.   There was a high probability of clinically significant/life threatening deterioration in the patient's condition which required my urgent intervention.      FINAL IMPRESSION      1. Dyspnea and respiratory abnormalities    2. Bandemia    3. Renal insufficiency    4. Cough    5. Chronic obstructive pulmonary disease, unspecified COPD type (HCC)    6. Hyperglycemia          DISPOSITION/PLAN     ED Disposition     ED Disposition   Decision to Admit    Condition   --    Comment   --             Comment: Please note this report has been produced using speech recognition software.      Baltazar Castaneda DO  Attending Emergency Physician               Baltazar Castaneda DO  09/05/22 2118

## 2022-09-06 PROBLEM — Z78.9 SELF-CATHETERIZES URINARY BLADDER: Status: ACTIVE | Noted: 2022-09-06

## 2022-09-06 PROBLEM — N39.0 ACUTE UTI (URINARY TRACT INFECTION): Status: ACTIVE | Noted: 2022-09-06

## 2022-09-06 LAB
ABO GROUP BLD: NORMAL
ABO GROUP BLD: NORMAL
ANION GAP SERPL CALCULATED.3IONS-SCNC: 13 MMOL/L (ref 5–15)
ANION GAP SERPL CALCULATED.3IONS-SCNC: 15 MMOL/L (ref 5–15)
ANION GAP SERPL CALCULATED.3IONS-SCNC: 17 MMOL/L (ref 5–15)
B PARAPERT DNA SPEC QL NAA+PROBE: NOT DETECTED
B PERT DNA SPEC QL NAA+PROBE: NOT DETECTED
BASOPHILS # BLD AUTO: 0.04 10*3/MM3 (ref 0–0.2)
BASOPHILS NFR BLD AUTO: 0.2 % (ref 0–1.5)
BLD GP AB SCN SERPL QL: NEGATIVE
BUN SERPL-MCNC: 77 MG/DL (ref 8–23)
BUN SERPL-MCNC: 79 MG/DL (ref 8–23)
BUN SERPL-MCNC: 83 MG/DL (ref 8–23)
BUN/CREAT SERPL: 25.8 (ref 7–25)
BUN/CREAT SERPL: 26 (ref 7–25)
BUN/CREAT SERPL: 27.2 (ref 7–25)
C PNEUM DNA NPH QL NAA+NON-PROBE: NOT DETECTED
CALCIUM SPEC-SCNC: 8.9 MG/DL (ref 8.6–10.5)
CALCIUM SPEC-SCNC: 8.9 MG/DL (ref 8.6–10.5)
CALCIUM SPEC-SCNC: 9.2 MG/DL (ref 8.6–10.5)
CHLORIDE SERPL-SCNC: 100 MMOL/L (ref 98–107)
CHLORIDE SERPL-SCNC: 92 MMOL/L (ref 98–107)
CHLORIDE SERPL-SCNC: 97 MMOL/L (ref 98–107)
CO2 SERPL-SCNC: 18 MMOL/L (ref 22–29)
CO2 SERPL-SCNC: 20 MMOL/L (ref 22–29)
CO2 SERPL-SCNC: 22 MMOL/L (ref 22–29)
CREAT SERPL-MCNC: 2.98 MG/DL (ref 0.76–1.27)
CREAT SERPL-MCNC: 3.04 MG/DL (ref 0.76–1.27)
CREAT SERPL-MCNC: 3.05 MG/DL (ref 0.76–1.27)
DEPRECATED RDW RBC AUTO: 42.4 FL (ref 37–54)
EGFRCR SERPLBLD CKD-EPI 2021: 19.6 ML/MIN/1.73
EGFRCR SERPLBLD CKD-EPI 2021: 19.7 ML/MIN/1.73
EGFRCR SERPLBLD CKD-EPI 2021: 20.1 ML/MIN/1.73
EOSINOPHIL # BLD AUTO: 0.08 10*3/MM3 (ref 0–0.4)
EOSINOPHIL NFR BLD AUTO: 0.5 % (ref 0.3–6.2)
ERYTHROCYTE [DISTWIDTH] IN BLOOD BY AUTOMATED COUNT: 13.7 % (ref 12.3–15.4)
FLUAV SUBTYP SPEC NAA+PROBE: NOT DETECTED
FLUBV RNA ISLT QL NAA+PROBE: NOT DETECTED
GLUCOSE BLDC GLUCOMTR-MCNC: 160 MG/DL (ref 70–130)
GLUCOSE BLDC GLUCOMTR-MCNC: 163 MG/DL (ref 70–130)
GLUCOSE BLDC GLUCOMTR-MCNC: 165 MG/DL (ref 70–130)
GLUCOSE BLDC GLUCOMTR-MCNC: 168 MG/DL (ref 70–130)
GLUCOSE BLDC GLUCOMTR-MCNC: 172 MG/DL (ref 70–130)
GLUCOSE BLDC GLUCOMTR-MCNC: 222 MG/DL (ref 70–130)
GLUCOSE BLDC GLUCOMTR-MCNC: 228 MG/DL (ref 70–130)
GLUCOSE BLDC GLUCOMTR-MCNC: 229 MG/DL (ref 70–130)
GLUCOSE BLDC GLUCOMTR-MCNC: 246 MG/DL (ref 70–130)
GLUCOSE BLDC GLUCOMTR-MCNC: 257 MG/DL (ref 70–130)
GLUCOSE BLDC GLUCOMTR-MCNC: 260 MG/DL (ref 70–130)
GLUCOSE BLDC GLUCOMTR-MCNC: 282 MG/DL (ref 70–130)
GLUCOSE BLDC GLUCOMTR-MCNC: 283 MG/DL (ref 70–130)
GLUCOSE BLDC GLUCOMTR-MCNC: 327 MG/DL (ref 70–130)
GLUCOSE BLDC GLUCOMTR-MCNC: 328 MG/DL (ref 70–130)
GLUCOSE BLDC GLUCOMTR-MCNC: 371 MG/DL (ref 70–130)
GLUCOSE BLDC GLUCOMTR-MCNC: 380 MG/DL (ref 70–130)
GLUCOSE SERPL-MCNC: 169 MG/DL (ref 65–99)
GLUCOSE SERPL-MCNC: 257 MG/DL (ref 65–99)
GLUCOSE SERPL-MCNC: 470 MG/DL (ref 65–99)
HADV DNA SPEC NAA+PROBE: NOT DETECTED
HBA1C MFR BLD: 11.8 % (ref 4.8–5.6)
HCOV 229E RNA SPEC QL NAA+PROBE: NOT DETECTED
HCOV HKU1 RNA SPEC QL NAA+PROBE: NOT DETECTED
HCOV NL63 RNA SPEC QL NAA+PROBE: NOT DETECTED
HCOV OC43 RNA SPEC QL NAA+PROBE: NOT DETECTED
HCT VFR BLD AUTO: 30.4 % (ref 37.5–51)
HCT VFR BLD AUTO: 31.6 % (ref 37.5–51)
HCT VFR BLD AUTO: 32.5 % (ref 37.5–51)
HCT VFR BLD AUTO: 32.7 % (ref 37.5–51)
HCT VFR BLD AUTO: 32.7 % (ref 37.5–51)
HCT VFR BLD AUTO: 33.6 % (ref 37.5–51)
HGB BLD-MCNC: 10.2 G/DL (ref 13–17.7)
HGB BLD-MCNC: 10.4 G/DL (ref 13–17.7)
HGB BLD-MCNC: 10.9 G/DL (ref 13–17.7)
HGB BLD-MCNC: 11.6 G/DL (ref 13–17.7)
HGB BLD-MCNC: 11.9 G/DL (ref 13–17.7)
HGB BLD-MCNC: 12 G/DL (ref 13–17.7)
HMPV RNA NPH QL NAA+NON-PROBE: NOT DETECTED
HPIV1 RNA ISLT QL NAA+PROBE: NOT DETECTED
HPIV2 RNA SPEC QL NAA+PROBE: NOT DETECTED
HPIV3 RNA NPH QL NAA+PROBE: NOT DETECTED
HPIV4 P GENE NPH QL NAA+PROBE: NOT DETECTED
IMM GRANULOCYTES # BLD AUTO: 0.2 10*3/MM3 (ref 0–0.05)
IMM GRANULOCYTES NFR BLD AUTO: 1.2 % (ref 0–0.5)
L PNEUMO1 AG UR QL IA: NEGATIVE
LYMPHOCYTES # BLD AUTO: 1.53 10*3/MM3 (ref 0.7–3.1)
LYMPHOCYTES NFR BLD AUTO: 8.9 % (ref 19.6–45.3)
M PNEUMO IGG SER IA-ACNC: NOT DETECTED
MAGNESIUM SERPL-MCNC: 2 MG/DL (ref 1.6–2.4)
MAGNESIUM SERPL-MCNC: 2.1 MG/DL (ref 1.6–2.4)
MAGNESIUM SERPL-MCNC: 2.7 MG/DL (ref 1.6–2.4)
MCH RBC QN AUTO: 34.4 PG (ref 26.6–33)
MCHC RBC AUTO-ENTMCNC: 36.7 G/DL (ref 31.5–35.7)
MCV RBC AUTO: 93.7 FL (ref 79–97)
MONOCYTES # BLD AUTO: 0.83 10*3/MM3 (ref 0.1–0.9)
MONOCYTES NFR BLD AUTO: 4.8 % (ref 5–12)
MRSA DNA SPEC QL NAA+PROBE: NEGATIVE
NEUTROPHILS NFR BLD AUTO: 14.59 10*3/MM3 (ref 1.7–7)
NEUTROPHILS NFR BLD AUTO: 84.4 % (ref 42.7–76)
NRBC BLD AUTO-RTO: 0 /100 WBC (ref 0–0.2)
OSMOLALITY SERPL: 324 MOSM/KG (ref 275–295)
PHOSPHATE SERPL-MCNC: 2.5 MG/DL (ref 2.5–4.5)
PHOSPHATE SERPL-MCNC: 3.1 MG/DL (ref 2.5–4.5)
PHOSPHATE SERPL-MCNC: 3.5 MG/DL (ref 2.5–4.5)
PLATELET # BLD AUTO: 276 10*3/MM3 (ref 140–450)
PMV BLD AUTO: 10.8 FL (ref 6–12)
POTASSIUM SERPL-SCNC: 4.5 MMOL/L (ref 3.5–5.2)
POTASSIUM SERPL-SCNC: 4.8 MMOL/L (ref 3.5–5.2)
POTASSIUM SERPL-SCNC: 4.8 MMOL/L (ref 3.5–5.2)
QT INTERVAL: 374 MS
QTC INTERVAL: 450 MS
RBC # BLD AUTO: 3.49 10*6/MM3 (ref 4.14–5.8)
RH BLD: POSITIVE
RH BLD: POSITIVE
RHINOVIRUS RNA SPEC NAA+PROBE: NOT DETECTED
RSV RNA NPH QL NAA+NON-PROBE: NOT DETECTED
S PNEUM AG SPEC QL LA: NEGATIVE
SARS-COV-2 RNA NPH QL NAA+NON-PROBE: NOT DETECTED
SODIUM SERPL-SCNC: 127 MMOL/L (ref 136–145)
SODIUM SERPL-SCNC: 132 MMOL/L (ref 136–145)
SODIUM SERPL-SCNC: 135 MMOL/L (ref 136–145)
T&S EXPIRATION DATE: NORMAL
VANCOMYCIN SERPL-MCNC: 23.2 MCG/ML (ref 5–40)
WBC NRBC COR # BLD: 17.27 10*3/MM3 (ref 3.4–10.8)

## 2022-09-06 PROCEDURE — 82962 GLUCOSE BLOOD TEST: CPT

## 2022-09-06 PROCEDURE — 85025 COMPLETE CBC W/AUTO DIFF WBC: CPT | Performed by: NURSE PRACTITIONER

## 2022-09-06 PROCEDURE — 86900 BLOOD TYPING SEROLOGIC ABO: CPT | Performed by: NURSE PRACTITIONER

## 2022-09-06 PROCEDURE — 94799 UNLISTED PULMONARY SVC/PX: CPT

## 2022-09-06 PROCEDURE — 83735 ASSAY OF MAGNESIUM: CPT | Performed by: NURSE PRACTITIONER

## 2022-09-06 PROCEDURE — G0378 HOSPITAL OBSERVATION PER HR: HCPCS

## 2022-09-06 PROCEDURE — 25010000002 SODIUM CHLORIDE 0.9 % WITH KCL 20 MEQ 20-0.9 MEQ/L-% SOLUTION: Performed by: NURSE PRACTITIONER

## 2022-09-06 PROCEDURE — 85018 HEMOGLOBIN: CPT | Performed by: NURSE PRACTITIONER

## 2022-09-06 PROCEDURE — 86900 BLOOD TYPING SEROLOGIC ABO: CPT

## 2022-09-06 PROCEDURE — 94640 AIRWAY INHALATION TREATMENT: CPT

## 2022-09-06 PROCEDURE — 84100 ASSAY OF PHOSPHORUS: CPT | Performed by: NURSE PRACTITIONER

## 2022-09-06 PROCEDURE — 86901 BLOOD TYPING SEROLOGIC RH(D): CPT

## 2022-09-06 PROCEDURE — 94664 DEMO&/EVAL PT USE INHALER: CPT

## 2022-09-06 PROCEDURE — 85014 HEMATOCRIT: CPT | Performed by: NURSE PRACTITIONER

## 2022-09-06 PROCEDURE — 87449 NOS EACH ORGANISM AG IA: CPT | Performed by: NURSE PRACTITIONER

## 2022-09-06 PROCEDURE — 92610 EVALUATE SWALLOWING FUNCTION: CPT

## 2022-09-06 PROCEDURE — 96366 THER/PROPH/DIAG IV INF ADDON: CPT

## 2022-09-06 PROCEDURE — 87641 MR-STAPH DNA AMP PROBE: CPT | Performed by: NURSE PRACTITIONER

## 2022-09-06 PROCEDURE — 86901 BLOOD TYPING SEROLOGIC RH(D): CPT | Performed by: NURSE PRACTITIONER

## 2022-09-06 PROCEDURE — 63710000001 INSULIN LISPRO (HUMAN) PER 5 UNITS: Performed by: INTERNAL MEDICINE

## 2022-09-06 PROCEDURE — 63710000001 INSULIN DETEMIR PER 5 UNITS: Performed by: INTERNAL MEDICINE

## 2022-09-06 PROCEDURE — 51798 US URINE CAPACITY MEASURE: CPT

## 2022-09-06 PROCEDURE — 86850 RBC ANTIBODY SCREEN: CPT | Performed by: NURSE PRACTITIONER

## 2022-09-06 PROCEDURE — 80202 ASSAY OF VANCOMYCIN: CPT

## 2022-09-06 PROCEDURE — 96368 THER/DIAG CONCURRENT INF: CPT

## 2022-09-06 PROCEDURE — 94761 N-INVAS EAR/PLS OXIMETRY MLT: CPT

## 2022-09-06 PROCEDURE — 99225 PR SBSQ OBSERVATION CARE/DAY 25 MINUTES: CPT | Performed by: INTERNAL MEDICINE

## 2022-09-06 PROCEDURE — 94660 CPAP INITIATION&MGMT: CPT

## 2022-09-06 PROCEDURE — 0202U NFCT DS 22 TRGT SARS-COV-2: CPT | Performed by: NURSE PRACTITIONER

## 2022-09-06 PROCEDURE — 80048 BASIC METABOLIC PNL TOTAL CA: CPT | Performed by: NURSE PRACTITIONER

## 2022-09-06 PROCEDURE — 25010000002 PIPERACILLIN SOD-TAZOBACTAM PER 1 G: Performed by: NURSE PRACTITIONER

## 2022-09-06 RX ORDER — NICOTINE POLACRILEX 4 MG
15 LOZENGE BUCCAL
Status: DISCONTINUED | OUTPATIENT
Start: 2022-09-06 | End: 2022-09-14 | Stop reason: HOSPADM

## 2022-09-06 RX ORDER — TAMSULOSIN HYDROCHLORIDE 0.4 MG/1
1 CAPSULE ORAL DAILY
COMMUNITY

## 2022-09-06 RX ORDER — SODIUM CHLORIDE, SODIUM LACTATE, POTASSIUM CHLORIDE, CALCIUM CHLORIDE 600; 310; 30; 20 MG/100ML; MG/100ML; MG/100ML; MG/100ML
100 INJECTION, SOLUTION INTRAVENOUS CONTINUOUS
Status: ACTIVE | OUTPATIENT
Start: 2022-09-06 | End: 2022-09-07

## 2022-09-06 RX ORDER — PIOGLITAZONEHYDROCHLORIDE 15 MG/1
30 TABLET ORAL DAILY
Status: DISCONTINUED | OUTPATIENT
Start: 2022-09-06 | End: 2022-09-14 | Stop reason: HOSPADM

## 2022-09-06 RX ORDER — INSULIN LISPRO 100 [IU]/ML
0-24 INJECTION, SOLUTION INTRAVENOUS; SUBCUTANEOUS
Status: DISCONTINUED | OUTPATIENT
Start: 2022-09-06 | End: 2022-09-14 | Stop reason: HOSPADM

## 2022-09-06 RX ORDER — DULAGLUTIDE 1.5 MG/.5ML
1.5 INJECTION, SOLUTION SUBCUTANEOUS WEEKLY
COMMUNITY

## 2022-09-06 RX ORDER — DEXTROSE MONOHYDRATE 25 G/50ML
25 INJECTION, SOLUTION INTRAVENOUS
Status: DISCONTINUED | OUTPATIENT
Start: 2022-09-06 | End: 2022-09-14 | Stop reason: HOSPADM

## 2022-09-06 RX ADMIN — TIMOLOL MALEATE 1 DROP: 5 SOLUTION/ DROPS OPHTHALMIC at 01:47

## 2022-09-06 RX ADMIN — POTASSIUM CHLORIDE AND SODIUM CHLORIDE 250 ML/HR: 900; 150 INJECTION, SOLUTION INTRAVENOUS at 06:27

## 2022-09-06 RX ADMIN — Medication 10 ML: at 21:30

## 2022-09-06 RX ADMIN — CLOPIDOGREL BISULFATE 75 MG: 75 TABLET ORAL at 08:33

## 2022-09-06 RX ADMIN — ROSUVASTATIN CALCIUM 10 MG: 10 TABLET, COATED ORAL at 01:44

## 2022-09-06 RX ADMIN — TAZOBACTAM SODIUM AND PIPERACILLIN SODIUM 3.38 G: 375; 3 INJECTION, SOLUTION INTRAVENOUS at 21:35

## 2022-09-06 RX ADMIN — INSULIN DETEMIR 50 UNITS: 100 INJECTION, SOLUTION SUBCUTANEOUS at 21:33

## 2022-09-06 RX ADMIN — METOPROLOL SUCCINATE 12.5 MG: 25 TABLET, EXTENDED RELEASE ORAL at 08:33

## 2022-09-06 RX ADMIN — ROSUVASTATIN CALCIUM 10 MG: 10 TABLET, COATED ORAL at 21:33

## 2022-09-06 RX ADMIN — IPRATROPIUM BROMIDE AND ALBUTEROL SULFATE 3 ML: .5; 3 SOLUTION RESPIRATORY (INHALATION) at 23:03

## 2022-09-06 RX ADMIN — AMLODIPINE BESYLATE 2.5 MG: 2.5 TABLET ORAL at 08:33

## 2022-09-06 RX ADMIN — TIMOLOL MALEATE 1 DROP: 5 SOLUTION/ DROPS OPHTHALMIC at 21:35

## 2022-09-06 RX ADMIN — SODIUM CHLORIDE, POTASSIUM CHLORIDE, SODIUM LACTATE AND CALCIUM CHLORIDE 100 ML/HR: 600; 310; 30; 20 INJECTION, SOLUTION INTRAVENOUS at 15:07

## 2022-09-06 RX ADMIN — IPRATROPIUM BROMIDE AND ALBUTEROL SULFATE 3 ML: .5; 3 SOLUTION RESPIRATORY (INHALATION) at 11:44

## 2022-09-06 RX ADMIN — TAZOBACTAM SODIUM AND PIPERACILLIN SODIUM 3.38 G: 375; 3 INJECTION, SOLUTION INTRAVENOUS at 13:00

## 2022-09-06 RX ADMIN — ASPIRIN 81 MG: 81 TABLET, COATED ORAL at 08:33

## 2022-09-06 RX ADMIN — IPRATROPIUM BROMIDE AND ALBUTEROL SULFATE 3 ML: .5; 3 SOLUTION RESPIRATORY (INHALATION) at 19:25

## 2022-09-06 RX ADMIN — Medication 10 ML: at 08:34

## 2022-09-06 RX ADMIN — LATANOPROST 1 DROP: 50 SOLUTION OPHTHALMIC at 21:35

## 2022-09-06 RX ADMIN — TAZOBACTAM SODIUM AND PIPERACILLIN SODIUM 3.38 G: 375; 3 INJECTION, SOLUTION INTRAVENOUS at 06:27

## 2022-09-06 RX ADMIN — TIMOLOL MALEATE 1 DROP: 5 SOLUTION/ DROPS OPHTHALMIC at 08:34

## 2022-09-06 RX ADMIN — IPRATROPIUM BROMIDE AND ALBUTEROL SULFATE 3 ML: .5; 3 SOLUTION RESPIRATORY (INHALATION) at 03:43

## 2022-09-06 RX ADMIN — IPRATROPIUM BROMIDE AND ALBUTEROL SULFATE 3 ML: .5; 3 SOLUTION RESPIRATORY (INHALATION) at 07:24

## 2022-09-06 RX ADMIN — FINASTERIDE 5 MG: 5 TABLET, FILM COATED ORAL at 08:33

## 2022-09-06 RX ADMIN — INSULIN DETEMIR 50 UNITS: 100 INJECTION, SOLUTION SUBCUTANEOUS at 14:09

## 2022-09-06 RX ADMIN — DEXTROSE MONOHYDRATE, SODIUM CHLORIDE, AND POTASSIUM CHLORIDE 150 ML/HR: 50; 9; 1.49 INJECTION, SOLUTION INTRAVENOUS at 07:12

## 2022-09-06 RX ADMIN — CETIRIZINE HYDROCHLORIDE 10 MG: 10 TABLET, FILM COATED ORAL at 08:33

## 2022-09-06 RX ADMIN — PIOGLITAZONE 30 MG: 15 TABLET ORAL at 15:07

## 2022-09-06 RX ADMIN — IPRATROPIUM BROMIDE AND ALBUTEROL SULFATE 3 ML: .5; 3 SOLUTION RESPIRATORY (INHALATION) at 15:51

## 2022-09-06 RX ADMIN — DEXTROSE MONOHYDRATE, SODIUM CHLORIDE, AND POTASSIUM CHLORIDE 150 ML/HR: 50; 9; 1.49 INJECTION, SOLUTION INTRAVENOUS at 03:10

## 2022-09-06 RX ADMIN — INSULIN LISPRO 16 UNITS: 100 INJECTION, SOLUTION INTRAVENOUS; SUBCUTANEOUS at 18:20

## 2022-09-06 NOTE — THERAPY EVALUATION
Acute Care - Speech Language Pathology   Swallow Initial Evaluation [facility]   Clinical Swallow Evaluation       Patient Name: Pro Pal  : 1940  MRN: [id number]  Today's Date: 2022               Admit Date: 2022    Visit Dx:     ICD-10-CM ICD-9-CM   1. Dyspnea and respiratory abnormalities  R06.00 786.09    R06.89    2. Bandemia  D72.825 288.66   3. Renal insufficiency  N28.9 593.9   4. Cough  R05.9 786.2   5. Chronic obstructive pulmonary disease, unspecified COPD type ([facility])  J44.9 496   6. Hyperglycemia  R73.9 790.29     Patient Active Problem List   Diagnosis   • STEMI   • COPD   • T2DM   • Dyslipidemia   • AGATHA on CPAP   • Enlarged prostate requiring self-catheterization   • Complete heart block   • HTN   • Gross hematuria   • Acute UTI (urinary tract infection)   • Sepsis ([facility])   • Type 2 diabetes mellitus with hyperglycemia ([facility])   • Pneumonia   • CAD (coronary artery disease)   • Hyperkalemia   • Acute renal failure superimposed on stage 3 chronic kidney disease ([facility])   • Dyspnea and respiratory abnormalities   • Self-catheterizes urinary bladder   • Acute UTI (urinary tract infection)     Past Medical History:   Diagnosis Date   • CAD (coronary artery disease) 2022   • COPD (chronic obstructive pulmonary disease) ([facility])    • Diabetes mellitus ([facility])    • Dyslipidemia 2021   • HTN 2021   • Sleep apnea    • STEMI 2021     Past Surgical History:   Procedure Laterality Date   • CARDIAC CATHETERIZATION N/A 2021    Procedure: Left Heart Cath;  Surgeon: Elisha Bettencourt MD;  Location: [facility] INVASIVE LOCATION;  Service: Cardiology;  Laterality: N/A;       SLP Recommendation and Plan  SLP Swallowing Diagnosis: other (see comments) (no suspected pharyngeal impairment, suspected esophageal impairment) (22)  SLP Diet Recommendation: regular textures, thin liquids (22)  Recommended Precautions and Strategies: upright posture during/after  eating, small bites of food and sips of liquid, alternate between small bites of food and sips of liquid, reflux precautions, general aspiration precautions (09/06/22 0910)  SLP Rec. for Method of Medication Administration: meds crushed, with pudding or applesauce, as tolerated (09/06/22 0910)     Monitor for Signs of Aspiration: yes, notify SLP if any concerns (09/06/22 0910)  Recommended Diagnostics: No further SLP services recommended (09/06/22 0910)  Swallow Criteria for Skilled Therapeutic Interventions Met: no problems identified which require skilled intervention (09/06/22 0910)  Anticipated Discharge Disposition (SLP): unknown (09/06/22 0910)  Rehab Potential/Prognosis, Swallowing: good, to achieve stated therapy goals (09/06/22 0910)  Therapy Frequency (Swallow): evaluation only (09/06/22 0910)     Demonstrates Need for Referral to Another Service: gastroenterology (09/06/22 0910)                                      SWALLOW EVALUATION (last 72 hours)     SLP Adult Swallow Evaluation     Row Name 09/06/22 0910                   Rehab Evaluation    Document Type evaluation  -        Subjective Information no complaints  -        Patient Observations alert;cooperative  -        Patient/Family/Caregiver Comments/Observations No family present  -        Patient Effort good  -        Symptoms Noted During/After Treatment none  -                  General Information    Patient Profile Reviewed yes  -        Pertinent History Of Current Problem Adm w/ intermittent shortness of breath, cough, & congestion. Hx: COPD, diabetes, sleep apnea, STEMI, HTN, BPH, HLD, heart block. CXR: mild streaky bibasilar opacities; scarring vs atelectasis  -        Current Method of Nutrition NPO;other (see comments)  until CSE  -        Precautions/Limitations, Vision WFL;for purposes of eval  -        Precautions/Limitations, Hearing WFL;for purposes of eval  -        Prior Level of Function-Communication  unknown  -        Prior Level of Function-Swallowing no diet consistency restrictions  -        Plans/Goals Discussed with patient;agreed upon  -        Barriers to Rehab none identified  -        Patient's Goals for Discharge patient did not state  -                  Pain    Additional Documentation Pain Scale: FACES Pre/Post-Treatment (Group)  -                  Pain Scale: FACES Pre/Post-Treatment    Pain: FACES Scale, Pretreatment 0-->no hurt  -        Posttreatment Pain Rating 0-->no hurt  -                  Oral Motor Structure and Function    Dentition Assessment upper dentures/partial in place;other (see comments)  lower dentures not in place  -        Secretion Management WNL/WFL  -        Mucosal Quality moist, healthy  -                  Oral Musculature and Cranial Nerve Assessment    Oral Motor General Assessment [facility]                  General Eating/Swallowing Observations    Respiratory Support Currently in Use nasal cannula  -        Eating/Swallowing Skills self-fed;fed by SLP  -        Positioning During Eating upright in bed  -        Utensils Used spoon;cup;straw  -        Consistencies Trialed thin liquids;pureed;regular textures  -                  Respiratory    Respiratory Status [facility]                  Clinical Swallow Eval    Pharyngeal Phase no overt signs/symptoms of pharyngeal impairment  -        Esophageal Phase suspected esophageal impairment  -        Clinical Swallow Evaluation Summary No overt s/s of aspiration w/ trials of thin liquid, pureed, or regular solid textures; even when pushed for consecutive sips of thin liquid. Consistent belching w/ thin liquid trials observed. Pt reports frequent globus sensation w/ solids resulting in regurgitation. Pt stated his esophagus feels “tight” after a ~3 bites of a regular solid meal. Pt would benefit from GI consult to further assess esophageal function. SLP will sign off, as no pharyngeal  impairment is suspected at this time. Rec: initiate regular diet w/ thin liquids, general aspiration precautions, alternative bites and sips. Please re-consult if further concerns arise.  -                  Esophageal Phase Concerns    Esophageal Phase Concerns belching;globus;sensation of material sticking  -        Globus regular consistencies  -        Belching thin  -        Sensation of Material Sticking regular consistencies  -                  SLP Evaluation Clinical Impression    SLP Swallowing Diagnosis other (see comments)  no suspected pharyngeal impairment, suspected esophageal impairment  -        Functional Impact risk of aspiration/pneumonia  -        Rehab Potential/Prognosis, Swallowing good, to achieve stated therapy goals  -        Swallow Criteria for Skilled Therapeutic Interventions Met no problems identified which require skilled intervention  -                  Recommendations    Therapy Frequency (Swallow) evaluation only  -        SLP Diet Recommendation regular textures;thin liquids  -        Recommended Diagnostics No further SLP services recommended  -        Recommended Precautions and Strategies upright posture during/after eating;small bites of food and sips of liquid;alternate between small bites of food and sips of liquid;reflux precautions;general aspiration precautions  -        Oral Care Recommendations Oral Care BID/PRN  -        SLP Rec. for Method of Medication Administration meds crushed;with pudding or applesauce;as tolerated  -        Monitor for Signs of Aspiration yes;notify SLP if any concerns  -        Anticipated Discharge Disposition (SLP) unknown  -        Demonstrates Need for Referral to Another Service gastroenterology  -              User Key  (r) = Recorded By, (t) = Taken By, (c) = Cosigned By    Initials Name Effective Dates     Leona Leo, MS, KRISTIE-SLP 06/22/22 -                 EDUCATION  The patient has been  educated in the following areas:   Dysphagia (Swallowing Impairment).              Time Calculation:    Time Calculation- SLP     Row Name 09/06/22 1042             Time Calculation- SLP    SLP Start Time 0910  -      SLP Received On 09/06/22  -              Untimed Charges    87270-GV Eval Oral Pharyng Swallow Minutes 45  -MH              Total Minutes    Untimed Charges Total Minutes 45  -MH       Total Minutes 45  -MH            User Key  (r) = Recorded By, (t) = Taken By, (c) = Cosigned By    Initials Name Provider Type     Leona Leo MS, CFY-SLP Speech and Language Pathologist                Therapy Charges for Today     Code Description Service Date Service Provider Modifiers Qty    [id number] HC ST EVAL ORAL PHARYNG SWALLOW 3 9/6/2022 Leona Leo MS, CFY-SLP GN 1            Patient was not wearing a face mask and did not exhibit coughing during this therapy encounter.  Procedure performed was not aerosolizing, did not involve close contact (within 6 feet for at least 15 minutes or longer), and did not involve contact with infectious secretions or specimens.  Therapist used appropriate personal protective equipment including gloves, standard procedure mask and eye protection.  Appropriate PPE was worn during the entire therapy session.  Hand hygiene was completed before and after therapy session.     Leona Leo MS, KRISTIE-SLP  9/6/2022

## 2022-09-06 NOTE — CASE MANAGEMENT/SOCIAL WORK
Discharge Planning Assessment  [facility]     Patient Name: Pro Pal  MRN: [id number]  Today's Date: 9/6/2022    Admit Date: 9/5/2022     Discharge Needs Assessment     Row Name 09/06/22 1128       Living Environment    People in Home alone    Current Living Arrangements home    Primary Care Provided by self       Discharge Needs Assessment    Readmission Within the Last 30 Days no previous admission in last 30 days    Equipment Currently Used at Home none    Concerns to be Addressed discharge planning    Current Discharge Risk chronically ill;lives alone;non-alliance               Discharge Plan     Row Name 09/06/22 1131       Plan    Plan home    Patient/Family in Agreement with Plan yes    Plan Comments I met with Mr. Pal at the bedside. He lives in [facility] alone. He ambulates independently and performs activities of daily living independently. His son transport him. karrie Pal anticipates going home after discharge from the hospital, but we are still awaiting PT recommendations. Case management will continue to follow his plan of care and assist with discharge planning needed.    Final Discharge Disposition Code 01 - home or self-care              Continued Care and Services - Admitted Since 9/5/2022    Coordination has not been started for this encounter.       Expected Discharge Date and Time     Expected Discharge Date Expected Discharge Time    Sep 9, 2022          Demographic Summary     Row Name 09/06/22 1126       General Information    General Information Comments I confirmed with Mr. Louis that he does not currently have a PCP and he has [employer] Medicare for his insurance.               Functional Status     Row Name 09/06/22 1127       Functional Status, IADL    Medications independent    Meal Preparation independent    Housekeeping independent    Laundry independent    Shopping independent               Psychosocial    No documentation.                Abuse/Neglect    No  documentation.                Legal    No documentation.                Substance Abuse    No documentation.                Patient Forms    No documentation.                   Jessica Brambila RN

## 2022-09-06 NOTE — CONSULTS
"Pharmacy Consult-Vancomycin Dosing  Pro Pal is a  82 y.o. male receiving vancomycin therapy.     Indication: pneumonia, sepsis, fever  Consulting Provider: Ciara SHEN  ID Consult: no    Goal AUC: 400 - 600 mg/L*hr    Current Antimicrobial Therapy  Anti-Infectives (From admission, onward)      Ordered     Dose/Rate Route Frequency Start Stop    09/05/22 2257  piperacillin-tazobactam (ZOSYN) 3.375 g in iso-osmotic dextrose 50 ml (premix)        Ordering Provider: Ciara Leavitt APRN    3.375 g  over 4 Hours Intravenous Every 8 Hours 09/06/22 0600 09/10/22 0559    09/05/22 2257  Pharmacy to dose vancomycin        Note to Pharmacy: Pro Pal  3E, S-[id number]  By Ciara SHEN   Ordering Provider: Ciara Leavitt APRN   \"And\" Linked Group Details     Does not apply Continuous PRN 09/05/22 2257 09/09/22 2256 09/05/22 2004  vancomycin 2000 mg/500 mL 0.9% NS IVPB ([facility])        Ordering Provider: Baltazar Castaneda DO    20 mg/kg × 102 kg Intravenous Once 09/05/22 2006 09/05/22 2220    09/05/22 2004  piperacillin-tazobactam (ZOSYN) 3.375 g in iso-osmotic dextrose 50 ml (premix)        Ordering Provider: Baltazar Castaneda DO    3.375 g  over 30 Minutes Intravenous Once 09/05/22 2006 09/05/22 2209            Allergies  Allergies as of 09/05/2022    (No Known Allergies)       Labs    Results from last 7 days   Lab Units 09/05/22  1911   BUN mg/dL 83*   CREATININE mg/dL 3.28*       Results from last 7 days   Lab Units 09/05/22  1911   WBC 10*3/mm3 22.80*       Evaluation of Dosing     Last Dose Received in the ED: 20 mg/kg (2000 mg) 9-5-22 @ 22:20  Is Patient on Dialysis or Renal Replacement:  no    Ht - 185.4 cm (73\")  Wt - 96.1 kg (211 lb 14.4 oz)    Estimated Creatinine Clearance: 20.9 mL/min (A) (by C-G formula based on SCr of 3.28 mg/dL (H)).    Intake & Output (last 3 days)         09/03 0701 09/04 0700 09/04 0701 09/05 0700 09/05 0701 09/06 0700    IV Piggyback   50    Total " Intake(mL/kg)   50 (0.5)    Net   +50                   Microbiology and Radiology  Microbiology Results (last 10 days)       Procedure Component Value - Date/Time    COVID PRE-OP / PRE-PROCEDURE SCREENING ORDER (NO ISOLATION) - Swab, Nasopharynx [[id number]]  (Normal) Collected: 09/1940    Lab Status: Final result Specimen: Swab from Nasopharynx Updated: 09/05/22 2011    Narrative:      The following orders were created for panel order COVID PRE-OP / PRE-PROCEDURE SCREENING ORDER (NO ISOLATION) - Swab, Nasopharynx.  Procedure                               Abnormality         Status                     ---------                               -----------         ------                     COVID-19 and FLU A/B PCR...[[id number]]  Normal              Final result                 Please view results for these tests on the individual orders.    COVID-19 and FLU A/B PCR - Swab, Nasopharynx [[id number]]  (Normal) Collected: 09/1940    Lab Status: Final result Specimen: Swab from Nasopharynx Updated: 09/05/22 2011     COVID19 Not Detected     Influenza A PCR Not Detected     Influenza B PCR Not Detected    Narrative:      Fact sheet for providers: https://www.fda.gov/media/899100/download    Fact sheet for patients: https://www.fda.gov/media/198190/download    Test performed by PCR.            Reported Vancomycin Levels  N/a     [facility] AUC Calculation:    Current AUC:   n/a   mg/L*hr    Predicted Steady State AUC on Current Dose: n/a   mg/L*hr  _________________________________    Predicted Steady State AUC on New Dose:   n/a   mg/L*hr    Assessment/Plan:  Vancomycin 20 mg/kg x 1 (given).  Vanc random level 9-6 1200    Further doses to be determined.    Jyoti Kerns [facility]  9/5/2022  23:10 EDT

## 2022-09-06 NOTE — H&P
"    [facility] Medicine Services  HISTORY AND PHYSICAL    Patient Name: Pro Pal  : 1940  MRN: [id number]  Primary Care Physician: Provider, No Known  Date of admission: 2022    Subjective   Subjective     Chief Complaint:  Shortness of air, cough, fatigue     HPI:  Pro Pal is a 82 y.o. male with a past medical history significant for COPD, diabetes mellitus type 2, AGATHA on CPAP, CAD, essential hypertension, BPH, hyperlipidemia and history of complete heart block that required temporary pacing, presents to the ED with complaints of worsening shortness of air, non-productive cough and fatigue over the past week.  Patient is not the best historian therefore HPI is difficult to obtain.  He notes that his cough has progressively worsened.  He additionally has had polydipsia, polyuria and poor appetite.  He denies any fever, chills, vomiting, diarrhea, abdominal pain, chest pain, hematuria or dysuria.  He notes yesterday feeling his \"chest burn\" with inspiration. Additionally he reports getting choked on eating french fries four days ago along with black diarrhea for two days.  Workup in the ED included CXR that showed mild streaky bibasilar opacities are favored to reflect scarring or atelectasis.   Glucose on arrival was 664.  Patient states he hasn't checked his glucose in a week.  Friend at bedside states this was usual for him.  He does report having his Januvia changed recently to humulin.  Currently patient is on RA with oxygen sat of 96%.  Patient will be admitted to [facility] under the care of the Hospitalist for further evaluation and treatment.     Also of clinical significance, the patient reports that he has not checked his blood glucose levels regularly.  He will reports that he is no longer taking his long-acting insulin and reports only using his Humulin  syringe.  States he takes 135 units in the morning and 125 units at night.  He reports only drinking " Gatorade for the past week.  States he is missed his insulin dosing.  Also reports at times when he takes his insulin he will wake up with a blood glucose level of 60 in the a.m.    Review of Systems   Constitutional: Positive for activity change, appetite change and fatigue. Negative for chills, diaphoresis, fever and unexpected weight change.   HENT: Negative.    Eyes: Negative for photophobia and visual disturbance.   Respiratory: Positive for cough and shortness of breath.    Cardiovascular: Negative for chest pain, palpitations and leg swelling.   Gastrointestinal: Positive for diarrhea (multiple black watery stools for the past 2 days. stopped around 4 pm. ). Negative for abdominal distention, abdominal pain, blood in stool, constipation, nausea and vomiting.   Endocrine: Positive for polydipsia and polyuria.   Genitourinary: Positive for frequency. Negative for difficulty urinating, dysuria, flank pain and hematuria.   Musculoskeletal: Negative for neck pain and neck stiffness.   Skin: Negative.    Neurological: Positive for weakness. Negative for syncope, speech difficulty, light-headedness and headaches.   Psychiatric/Behavioral: Negative.         All other systems reviewed and are negative.     Personal History     Past Medical History:   Diagnosis Date   • CAD (coronary artery disease) 9/5/2022   • COPD (chronic obstructive pulmonary disease) (HCC)    • Diabetes mellitus (HCC)    • Dyslipidemia 5/24/2021   • HTN 5/24/2021   • Sleep apnea    • STEMI 5/24/2021             Past Surgical History:   Procedure Laterality Date   • CARDIAC CATHETERIZATION N/A 5/24/2021    Procedure: Left Heart Cath;  Surgeon: Elisha Bettencourt MD;  Location: [facility] INVASIVE LOCATION;  Service: Cardiology;  Laterality: N/A;       Family History:  family history includes Heart disease in his father. Otherwise pertinent FHx was reviewed and unremarkable.     Social History:  reports that he has quit smoking. His smoking  use included cigarettes. He smoked 2.00 packs per day. He has quit using smokeless tobacco. He reports that he does not drink alcohol and does not use drugs.  Social History     Social History Narrative    LIVES ALONE, 2 adult children, son., pedro sarkar, and daughter rubi grider        Medications:  Insulin Pen Needle, Magnesium Gluconate, acetaminophen, albuterol sulfate HFA, amLODIPine, aspirin, capsaicin, clopidogrel, famotidine, finasteride, fish oil, glucose blood, insulin aspart, insulin glargine, latanoprost, lisinopril, loratadine, metoprolol succinate XL, nitroglycerin, pioglitazone, rosuvastatin, timolol, and tiotropium bromide-olodaterol  Pt reports he is no longer taking lantus or any of his oral diabetic meds.     No Known Allergies    Objective   Objective     Vital Signs:   Temp:  [98.1 °F (36.7 °C)] 98.1 °F (36.7 °C)  Heart Rate:  [82-88] 84  Resp:  [20] 20  BP: (148-154)/(66-68) 148/68    Physical Exam  Vitals and nursing note reviewed.   Constitutional:       General: He is not in acute distress.     Appearance: Normal appearance. He is obese. He is not ill-appearing, toxic-appearing or diaphoretic.   HENT:      Head: Normocephalic and atraumatic.      Nose: Nose normal.      Mouth/Throat:      Mouth: Mucous membranes are dry.      Pharynx: Oropharynx is clear.   Eyes:      General: No scleral icterus.        Right eye: No discharge.         Left eye: No discharge.      Extraocular Movements: Extraocular movements intact.      Conjunctiva/sclera: Conjunctivae normal.      Pupils: Pupils are equal, round, and reactive to light.   Cardiovascular:      Rate and Rhythm: Normal rate and regular rhythm.      Pulses: Normal pulses.      Heart sounds: Normal heart sounds.   Pulmonary:      Effort: Pulmonary effort is normal.      Comments: Decreased to bilateral bases   Abdominal:      General: Bowel sounds are normal. There is no distension.      Palpations: Abdomen is soft. There is no mass.       Tenderness: There is no abdominal tenderness. There is no right CVA tenderness, left CVA tenderness, guarding or rebound.      Hernia: No hernia is present.   Musculoskeletal:         General: No swelling, tenderness, deformity or signs of injury. Normal range of motion.      Cervical back: Normal range of motion and neck supple.      Right lower leg: No edema.      Left lower leg: No edema.   Skin:     General: Skin is warm and dry.   Neurological:      General: No focal deficit present.      Mental Status: He is alert and oriented to person, place, and time. Mental status is at baseline.   Psychiatric:         Mood and Affect: Mood normal.         Behavior: Behavior normal.         Thought Content: Thought content normal.         Judgment: Judgment normal.          Results Reviewed:  I have personally reviewed most recent indicated data and agree with findings including:  [x]  Laboratory  [x]  Radiology  [x]  EKG/Telemetry  []  Pathology  []  Cardiac/Vascular Studies  []  Old records  []  Other:  Most pertinent findings include:      LAB RESULTS:      Lab 09/05/22 1911   WBC 22.80*   HEMOGLOBIN 12.3*   HEMATOCRIT 35.7*   PLATELETS 374   NEUTROS ABS 19.05*   IMMATURE GRANS (ABS) 0.29*   LYMPHS ABS 1.65   MONOS ABS 1.72*   EOS ABS 0.04   MCV 89.3   PROCALCITONIN 1.32*   LACTATE 1.9         Lab 09/05/22 1911   SODIUM 122*   POTASSIUM 5.3*   CHLORIDE 84*   CO2 19.0*   ANION GAP 19.0*   BUN 83*   CREATININE 3.28*   EGFR 18.0*   GLUCOSE 664*   CALCIUM 9.8         Lab 09/05/22 1911   TOTAL PROTEIN 8.7*   ALBUMIN 3.50   GLOBULIN 5.2   ALT (SGPT) 29   AST (SGOT) 35   BILIRUBIN 0.4   ALK PHOS 130*         Lab 09/05/22 1911   PROBNP 1,048.0   TROPONIN T <0.010                 Lab 09/05/22 2101   PH, ARTERIAL 7.384   PCO2, ARTERIAL 34.3*   PO2 ART 76.4*   FIO2 21   HCO3 ART 20.4   BASE EXCESS ART -4.0*   CARBOXYHEMOGLOBIN 1.0     Brief Urine Lab Results  (Last result in the past 365 days)      Color   Clarity   Blood    Leuk Est   Nitrite   Protein   CREAT   Urine HCG        05/24/22 1109 Yellow   Clear     Moderate                   Microbiology Results (last 10 days)     Procedure Component Value - Date/Time    COVID PRE-OP / PRE-PROCEDURE SCREENING ORDER (NO ISOLATION) - Swab, Nasopharynx [[id number]]  (Normal) Collected: 09/1940    Lab Status: Final result Specimen: Swab from Nasopharynx Updated: 09/05/22 2011    Narrative:      The following orders were created for panel order COVID PRE-OP / PRE-PROCEDURE SCREENING ORDER (NO ISOLATION) - Swab, Nasopharynx.  Procedure                               Abnormality         Status                     ---------                               -----------         ------                     COVID-19 and FLU A/B PCR...[[id number]]  Normal              Final result                 Please view results for these tests on the individual orders.    COVID-19 and FLU A/B PCR - Swab, Nasopharynx [[id number]]  (Normal) Collected: 09/1940    Lab Status: Final result Specimen: Swab from Nasopharynx Updated: 09/05/22 2011     COVID19 Not Detected     Influenza A PCR Not Detected     Influenza B PCR Not Detected    Narrative:      Fact sheet for providers: https://www.fda.gov/media/123631/download    Fact sheet for patients: https://www.fda.gov/media/141468/download    Test performed by PCR.          XR Chest 1 View    Result Date: 9/5/2022  DATE OF EXAM: 9/5/2022 7:09 PM  PROCEDURE: XR CHEST 1 VW-  INDICATIONS: SOA triage protocol.  COMPARISON: None available.  TECHNIQUE: Single frontal view of the chest.  FINDINGS: Normal cardiomediastinal silhouette. There are streaky bibasilar parenchymal opacities, likely scarring or atelectasis. Otherwise the lungs are grossly clear. No significant pleural effusion. No evidence of pneumothorax. No acute osseous findings.      Impression: Mild streaky bibasilar opacities are favored to reflect scarring or atelectasis.  This report was finalized on 9/5/2022  7:49 PM by Migel Shaw MD.        Results for orders placed during the hospital encounter of 05/24/21    Adult Transthoracic Echo Complete W/ Cont if Necessary Per Protocol    Interpretation Summary  · Estimated left ventricular EF = 55% Left ventricular systolic function is normal.  · Left ventricular diastolic function is consistent with (grade II w/high LAP) pseudonormalization.  · Mild mitral valve regurgitation is present.  · Mild tricuspid valve regurgitation is present.  · Calculated right ventricular systolic pressure from tricuspid regurgitation is 39 mmHg.      Assessment & Plan   Assessment & Plan       COPD    Dyslipidemia    AGATHA on CPAP    HTN    Sepsis (HCC)    Type 2 diabetes mellitus with hyperglycemia (HCC)    Pneumonia    CAD (coronary artery disease)    Hyperkalemia    Acute renal failure superimposed on stage 3 chronic kidney disease (HCC)      82 year old male presents to the ED with worsening shortness of air, cough and fatigue over the past week.     1)  Sepsis        Pneumonia   -CXR concerning for bilateral streak opacities  -will obtain CT of chest without due to noted hypoxia on ABG  -ABG noted above  -blood cultures x2 pending   -IVF  -sputum culture  -COVID-19 negative   -obtain respiratory PCR   -check urine antigens   -continue vancomycin and zosyn  -lactic acid and procal pending     2) Diabetes mellitus type 2      HHS vs DKA  -glucose on arrival 664, anion gap 19, positive beta hydroxy   -ABG noted above  -will start HHS protocol   -give 1 L of IVF now   -q 1 hour fingersticks  -ordered 15 units of humalog, will repeat BMP after insulin and IVF  -serial BMP  -check Hgb A1c   -corrected sodium: 136    3) Acute renal failure superimposed on CKD III   -baseline creatinine 1.8-2.1, currently 3.28  -hold nephrotoxic agents  -IVF  -serial BMP   -consider nephrology consult if no improvement     4) Hyperkalemia   -likely secondary to #3  -will give insulin now   -serial BMP   -EKG no  peaked T-waves   -telemetry monitoring     5) non-oxygen dependent COPD  -continuous pulse ox  -keep o2 sat >90%  -scheduled and prn duo-nebs   -ABG noted above  -CT of chest without contrast pending     6) CAD  -s/p PCI in May 2021  -on ASA, plavix, statin     7) Hx of complete heart block   -S/p PPM    8) AGATHA   -CPAP at HS     DVT prophylaxis:  Scds, heparin     CODE STATUS:  Full code        This note has been completed as part of a split-shared workflow.     Signature: Electronically signed by HERMELINDA Trammell, 09/05/22, 9:29 PM EDT.          Attending   Admission Attestation       I have performed an independent face-to-face diagnostic evaluation including performing an independent physical examination as documented here.  The documented plan of care above was reviewed and developed with the advanced practice clinician (APC).      Brief Summary Statement:   Pro Pal is a 82 y.o. male with PMHx of HTN, CAD, s/p STEMI, COPD, type 2DM, AGATHA on CPAP, HLD and complete heart block.  Patient was brought to [facility] ED with shortness of breath, nausea, poor p.o. intake and nonproductive cough and increased fatigue over the past week.  Patient is a difficult historian and is not able to provide a medication list and states he does not take several of his oral medications.  Also reports having stopped his long-acting insulin.  Patient's son is at bedside.  Patient reports that he has had a worsening productive cough.  Complains of polydipsia polyuria and poor appetite.  Complains of difficulty eating and reports having gotten choked on French fries 4 days ago.  Also reports black diarrhea for the past 2 days that resolved around 4 PM today.  Patient's son went to check on check on patient when and neighbor reported that he was not answering his phone.  Patient's son states that he had advised that he had felt fatigued on Saturday but reported no other complaints to his son.  On arrival to the  ED patient was noted to have mild streaky basilar opacities on chest x-ray.  Patient reports some burning sensation in chest with inspiration.  ABG noted a decreased PO2 of 76.4.  Clinically patient symptoms are consistent with pneumonia.  WBC count was 22,000.  UA noted 1+ leukocyte esterase, 2+ blood, 31-50 WBCs and 4+ bacteria.  Therefore decision was made admit patient to hospitalist service with broad-spectrum antibiotic coverage.     He was noted to have a glucose of 664.  Patient reports that he has not checked his blood glucose levels regularly.  He will reports that he is no longer taking his long-acting insulin and reports only using his Humulin  syringe.  States he takes 135 units in the morning and 125 units at night.  He reports only drinking Gatorade for the past week.  States he is missed his insulin dosing.  Also reports at times when he takes his insulin he will wake up with a blood glucose level of 60 in the a.m.       Remainder of detailed HPI is as noted by APC and has been reviewed and/or edited by me for completeness.    Attending Physical Exam:  Temp:  [97.5 °F (36.4 °C)-98.1 °F (36.7 °C)] 97.5 °F (36.4 °C)  Heart Rate:  [81-91] 91  Resp:  [18-20] 18  BP: (148-155)/(66-72) 155/72  Flow (L/min):  [2] 2    Constitutional: Awake, alert  Eyes: PERRLA, sclerae anicteric, no conjunctival injection  HENT: NCAT, mucous membranes moist  Neck: Supple, no thyromegaly, no lymphadenopathy, trachea midline  Respiratory: Rales in bases, nonlabored respirations   Cardiovascular: RRR, no murmurs, rubs, or gallops, palpable pedal pulses bilaterally  Gastrointestinal: Positive bowel sounds, soft, nontender, nondistended  Musculoskeletal: No bilateral ankle edema, no clubbing or cyanosis to extremities  Psychiatric: Appropriate affect, cooperative  Neurologic: Oriented x 3, strength symmetric in all extremities, Cranial Nerves grossly intact to confrontation, speech clear  Skin: No rashes      Brief  Assessment/Plan :  See detailed assessment and plan developed with APC which I have reviewed and/or edited for completeness.      Lu Pérez, DO  09/06/22

## 2022-09-06 NOTE — PROGRESS NOTES
[facility] Medicine Services  PROGRESS NOTE    Patient Name: Pro Pal  : 1940  MRN: [id number]    Date of Admission: 2022  Primary Care Physician: Provider, No Known    Subjective   Subjective     CC:  Follow-up for shortness of breath and HHS    HPI:  I have seen and evaluated the patient this morning.  Appears comfortable in bed.  Reported that he is feeling slightly better today.  Admits having urinary symptoms in the form of urgency and frequency and burning sensation, having some dry cough.  Oxygen saturation is 95% on room air.  Admits not taking his insulin for 6 days now.    ROS:  General : no fevers, chills  CVS: No chest pain, palpitations  Respiratory: No cough, dyspnea  GI: No N/V/D, abd pain  10 point review of systems is negative except for what is mentioned in HPI     Objective   Objective     Vital Signs:   Temp:  [97.5 °F (36.4 °C)-99.2 °F (37.3 °C)] 99.2 °F (37.3 °C)  Heart Rate:  [78-91] 79  Resp:  [18-20] 18  BP: (125-155)/(63-87) 125/63  Flow (L/min):  [2] 2     Physical Exam:  General: Chronically ill looking.  Conversant and pleasant.  Head: Atraumatic and normocephalic, without obvious abnormality  Eyes:   Conjunctivae and sclerae normal, no Icterus. No pallor  Ears:  Ears appear intact with no abnormalities noted  Throat: No oral lesions, no thrush, oral mucosa moist  Neck: Supple, trachea midline, no thyromegaly  Back:   No kyphoscoliosis present. No tenderness to palpation,   no sacral edema  Lungs: Clear to auscultation bilaterally, equal air entry, no wheezing or crackles  Heart:  Normal S1 and S2, no murmur, no gallop, No JVD, no lower extremity swelling  Abdomen:  Soft, suprapubic tenderness, no organomegaly, normal bowel sounds  Normal bowel sounds, no masses, no organomegaly. Soft, nontender, nondistended, no guarding, no rebound tenderness.  Extremities: No gross abnormalities, no clubbing, pulses palpable and equal bilaterally  Skin: No  bleeding, bruising or rash, normal skin turgor and elasticity  Neurologic: Cranial nerves appear intact with no evidence of facial asymmetry, normal motor and sensory functions in all 4 extremities  Psych: Alert and oriented x 3, normal mood    Results Reviewed:  LAB RESULTS:      Lab 09/06/22 0341 09/06/22 0004 09/05/22 1911   WBC  --  17.27* 22.80*   HEMOGLOBIN 11.6* 12.0* 12.3*   HEMATOCRIT 32.7* 32.7* 35.7*   PLATELETS  --  276 374   NEUTROS ABS  --  14.59* 19.05*   IMMATURE GRANS (ABS)  --  0.20* 0.29*   LYMPHS ABS  --  1.53 1.65   MONOS ABS  --  0.83 1.72*   EOS ABS  --  0.08 0.04   MCV  --  93.7 89.3   PROCALCITONIN  --   --  1.32*   LACTATE  --   --  1.9         Lab 09/06/22 0341 09/06/22  0004 09/05/22 2227 09/05/22 1911   SODIUM 132* 127*  --  122*   POTASSIUM 4.5 4.8  --  5.3*   CHLORIDE 97* 92*  --  84*   CO2 20.0* 18.0*  --  19.0*   ANION GAP 15.0 17.0*  --  19.0*   BUN 79* 83*  --  83*   CREATININE 3.04* 3.05*  --  3.28*   EGFR 19.7* 19.6*  --  18.0*   GLUCOSE 257* 470* 578* 664*   CALCIUM 9.2 8.9  --  9.8   MAGNESIUM 2.1 2.7* 1.9  --    PHOSPHORUS 3.1 3.5 3.7  --    HEMOGLOBIN A1C  --   --   --  11.80*         Lab 09/05/22 1911   TOTAL PROTEIN 8.7*   ALBUMIN 3.50   GLOBULIN 5.2   ALT (SGPT) 29   AST (SGOT) 35   BILIRUBIN 0.4   ALK PHOS 130*         Lab 09/05/22 1911   PROBNP 1,048.0   TROPONIN T <0.010             Lab 09/06/22 0341 09/06/22 0004   ABO TYPING A A   RH TYPING Positive Positive   ANTIBODY SCREEN  --  Negative         Lab 09/05/22 2101   PH, ARTERIAL 7.384   PCO2, ARTERIAL 34.3*   PO2 ART 76.4*   FIO2 21   HCO3 ART 20.4   BASE EXCESS ART -4.0*   CARBOXYHEMOGLOBIN 1.0     Brief Urine Lab Results  (Last result in the past 365 days)      Color   Clarity   Blood   Leuk Est   Nitrite   Protein   CREAT   Urine HCG        09/05/22 2221 Yellow   Cloudy   Moderate (2+)   Small (1+)   Negative   100 mg/dL (2+)                 Microbiology Results Abnormal     Procedure Component Value -  Date/Time    MRSA Screen, PCR (Inpatient) - Swab, Nares [[id number]]  (Normal) Collected: 09/06/22 0619    Lab Status: Final result Specimen: Swab from Nares Updated: 09/06/22 0813     MRSA PCR Negative    Narrative:      The negative predictive value of this diagnostic test is high and should only be used to consider de-escalating anti-MRSA therapy. A positive result may indicate colonization with MRSA and must be correlated clinically.  MRSA Negative    Respiratory Panel PCR w/COVID-19(SARS-CoV-2) TREASURE/MAIKOL/ELTON/PAD/COR/MAD/KISHORE In-House, NP Swab in UTM/VTM, 3-4 HR TAT - Swab, Nasopharynx [[id number]]  (Normal) Collected: 09/06/22 0619    Lab Status: Final result Specimen: Swab from Nasopharynx Updated: 09/06/22 0750     ADENOVIRUS, PCR Not Detected     Coronavirus 229E Not Detected     Coronavirus HKU1 Not Detected     Coronavirus NL63 Not Detected     Coronavirus OC43 Not Detected     COVID19 Not Detected     Human Metapneumovirus Not Detected     Human Rhinovirus/Enterovirus Not Detected     Influenza A PCR Not Detected     Influenza B PCR Not Detected     Parainfluenza Virus 1 Not Detected     Parainfluenza Virus 2 Not Detected     Parainfluenza Virus 3 Not Detected     Parainfluenza Virus 4 Not Detected     RSV, PCR Not Detected     Bordetella pertussis pcr Not Detected     Bordetella parapertussis PCR Not Detected     Chlamydophila pneumoniae PCR Not Detected     Mycoplasma pneumo by PCR Not Detected    Narrative:      In the setting of a positive respiratory panel with a viral infection PLUS a negative procalcitonin without other underlying concern for bacterial infection, consider observing off antibiotics or discontinuation of antibiotics and continue supportive care. If the respiratory panel is positive for atypical bacterial infection (Bordetella pertussis, Chlamydophila pneumoniae, or Mycoplasma pneumoniae), consider antibiotic de-escalation to target atypical bacterial infection.    COVID PRE-OP /  PRE-PROCEDURE SCREENING ORDER (NO ISOLATION) - Swab, Nasopharynx [[id number]]  (Normal) Collected: 09/1940    Lab Status: Final result Specimen: Swab from Nasopharynx Updated: 09/05/22 2011    Narrative:      The following orders were created for panel order COVID PRE-OP / PRE-PROCEDURE SCREENING ORDER (NO ISOLATION) - Swab, Nasopharynx.  Procedure                               Abnormality         Status                     ---------                               -----------         ------                     COVID-19 and FLU A/B PCR...[[id number]]  Normal              Final result                 Please view results for these tests on the individual orders.    COVID-19 and FLU A/B PCR - Swab, Nasopharynx [[id number]]  (Normal) Collected: 09/1940    Lab Status: Final result Specimen: Swab from Nasopharynx Updated: 09/05/22 2011     COVID19 Not Detected     Influenza A PCR Not Detected     Influenza B PCR Not Detected    Narrative:      Fact sheet for providers: https://www.fda.gov/media/239301/download    Fact sheet for patients: https://www.fda.gov/media/602725/download    Test performed by PCR.        CT Chest Without Contrast Diagnostic    Result Date: 9/5/2022  EXAMINATION: CT SCAN OF THE CHEST WITHOUT INTRAVENOUS CONTRAST DATE OF EXAM: 9/5/2022 9:34 PM HISTORY: Bibasilar pneumonia COMPARISON: None. TECHNIQUE: CT examination of the chest was performed without intravenous contrast. CT dose lowering techniques were used, to include: automated exposure control, adjustment for patient size, and/or use of iterative reconstruction. 3D MIP reconstruction was performed under concurrent supervision not requiring an independent workstation, in order to increase the sensitivity for detection of pulmonary nodules. Note: The exam is limited because some types of pathology may not be adequately demonstrated due to lack of contrast enhancement. FINDINGS: CHEST: Lungs:  Bilateral lower lobe linear groundglass opacities  extending to the pleural surfaces are most suggestive of atelectasis and/or scarring. No focal consolidation.. Pleura: Normal. Mediastinum And Elayne: No acute abnormality. A few calcified lymph nodes which are most compatible with sequela of old granulomatous disease. Cardiovascular: No acute abnormality on this nonenhanced exam.  Patchy moderate calcification of the coronary arteries and mitral valve and to a lesser degree the aortic annulus. Mild patchy calcified atherosclerosis of the thoracic aorta and proximal aspects of the branching great vessels. Chest Wall:  No evidence of acute abnormality. Upper Abdomen: No acute abnormality. Cholelithiasis. MUSCULOSKELETAL: No evidence of acute abnormality.     Impression: 1.  Bibasal atelectasis and/or scarring. Superimposed infection is not entirely excluded but thought to be less likely. 2.  Cholelithiasis. Electronically signed by:  Leonid Acosta D.O.  9/5/2022 8:11 PM Mountain Time    XR Chest 1 View    Result Date: 9/5/2022  DATE OF EXAM: 9/5/2022 7:09 PM  PROCEDURE: XR CHEST 1 VW-  INDICATIONS: SOA triage protocol.  COMPARISON: None available.  TECHNIQUE: Single frontal view of the chest.  FINDINGS: Normal cardiomediastinal silhouette. There are streaky bibasilar parenchymal opacities, likely scarring or atelectasis. Otherwise the lungs are grossly clear. No significant pleural effusion. No evidence of pneumothorax. No acute osseous findings.      Impression: Mild streaky bibasilar opacities are favored to reflect scarring or atelectasis.  This report was finalized on 9/5/2022 7:49 PM by Migel Shaw MD.        Results for orders placed during the hospital encounter of 05/24/21    Adult Transthoracic Echo Complete W/ Cont if Necessary Per Protocol    Interpretation Summary  · Estimated left ventricular EF = 55% Left ventricular systolic function is normal.  · Left ventricular diastolic function is consistent with (grade II w/high LAP) pseudonormalization.  ·  Mild mitral valve regurgitation is present.  · Mild tricuspid valve regurgitation is present.  · Calculated right ventricular systolic pressure from tricuspid regurgitation is 39 mmHg.    I have reviewed the medications:  Scheduled Meds:amLODIPine, 2.5 mg, Oral, Daily  aspirin, 81 mg, Oral, Daily  cetirizine, 10 mg, Oral, Daily  clopidogrel, 75 mg, Oral, Daily  finasteride, 5 mg, Oral, Daily  ipratropium-albuterol, 3 mL, Nebulization, Q4H - RT  latanoprost, 1 drop, Both Eyes, Nightly  metoprolol succinate XL, 12.5 mg, Oral, Q24H  piperacillin-tazobactam, 3.375 g, Intravenous, Q8H  rosuvastatin, 10 mg, Oral, Nightly  sodium chloride, 10 mL, Intravenous, Q12H  timolol, 1 drop, Both Eyes, BID  vancomycin (dosing per levels), , Does not apply, Daily      Continuous Infusions:dextrose 5 % and sodium chloride 0.45 %, 150 mL/hr  dextrose 5 % and sodium chloride 0.45 % with KCl 20 mEq/L, 150 mL/hr  dextrose 5 % and sodium chloride 0.45 % with KCl 40 mEq/L, 150 mL/hr  dextrose 5 % and sodium chloride 0.9 %, 150 mL/hr  dextrose 5 % and sodium chloride 0.9 % with KCl 20 mEq, 150 mL/hr, Last Rate: 150 mL/hr (09/06/22 0712)  dextrose 5% and sodium chloride 0.9% with KCl 40 mEq/L, 150 mL/hr  insulin, 0-100 Units/hr, Last Rate: 5 Units/hr (09/06/22 0713)  Pharmacy to dose vancomycin,   custom IV KCl infusion builder, 250 mL/hr  sodium chloride, 250 mL/hr  sodium chloride 0.45 % with KCl 20 mEq, 250 mL/hr  sodium chloride, 250 mL/hr  sodium chloride 0.9 % with KCl 20 mEq, 250 mL/hr, Last Rate: 250 mL/hr (09/06/22 0627)  sodium chloride 0.9 % with KCl 40 mEq/L, 250 mL/hr      PRN Meds:.•  acetaminophen **OR** acetaminophen **OR** acetaminophen  •  acetaminophen  •  albuterol  •  dextrose  •  dextrose  •  dextrose 5 % and sodium chloride 0.45 %  •  dextrose 5 % and sodium chloride 0.45 % with KCl 20 mEq/L  •  dextrose 5 % and sodium chloride 0.45 % with KCl 40 mEq/L  •  dextrose 5 % and sodium chloride 0.9 %  •  dextrose 5 % and  sodium chloride 0.9 % with KCl 20 mEq  •  dextrose 5% and sodium chloride 0.9% with KCl 40 mEq/L  •  famotidine  •  glucagon (human recombinant)  •  [DISCONTINUED] vancomycin **AND** Pharmacy to dose vancomycin  •  custom IV KCl infusion builder  •  sodium chloride  •  sodium chloride 0.45 % with KCl 20 mEq  •  sodium chloride  •  sodium chloride  •  sodium chloride 0.9 % with KCl 20 mEq  •  sodium chloride 0.9 % with KCl 40 mEq/L    Assessment & Plan   Assessment & Plan     Active Hospital Problems    Diagnosis  POA   • Self-catheterizes urinary bladder [Z78.9]  Unknown   • Acute UTI (urinary tract infection) [N39.0]  Unknown   • Sepsis (HCC) [A41.9]  Yes   • Type 2 diabetes mellitus with hyperglycemia (HCC) [E11.65]  Yes   • Pneumonia [J18.9]  Yes   • CAD (coronary artery disease) [I25.10]  Yes   • Hyperkalemia [E87.5]  Yes   • Acute renal failure superimposed on stage 3 chronic kidney disease (HCC) [N17.9, N18.30]  Yes   • Dyspnea and respiratory abnormalities [R06.00, R06.89]  Yes   • COPD [J44.9]  Yes   • Dyslipidemia [E78.5]  Yes   • HTN [I10]  Yes   • AGATHA on CPAP [G47.33, Z99.89]  Not Applicable      Resolved Hospital Problems   No resolved problems to display.     Brief Hospital Course to date:  Pro Pal is a 82 y.o. male with past medical history of COPD, type 2 diabetes, obstructive sleep apnea on CPAP, coronary artery disease, essential hypertension, BPH, dyslipidemia, complete heart block status post pacemaker, who presented to the hospital with increasing fatigue, worsening shortness of breath and productive cough.    Assessment and plan:  Severe sepsis 2/2 UTI  Leukocytosis  Pneumonia ruled out  · CT scan chest essentially ruled out pneumonia and patient with minimal respiratory symptoms  · Urine analysis is positive for UTI and urine culture is pending  · MRSA swab negative, discontinue vancomycin  · Continue Zosyn and follow cultures  · Continue IV fluids    HHS, resolved  Poorly controlled  type 2 diabetes, A1c 11.8%  Medication noncompliance  · Did not take his insulin time 6 days  · Has been drinking plenty of Gatorade.  Blood sugar was markedly evaded on admission.  Started on Glucomander HHS protocol  · Average insulin requirement while hospitalized is 5 to 6 units/h over the last 24 hours (around 100 to 120 units daily).  He is taking insulin Humulin R U5 100, 260 units at home (130 5 in the AM and 120 5 in the PM)  · Stop insulin drip and start the patient on long-acting insulin with insulin Levemir 50 units twice daily and high-dose insulin sliding scale  · Counseled about the importance to comply with his insulin     Acute renal failure superimposed on CKD III    Hyperkalemia, resolved  · Likely secondary to dehydration Baseline creatinine is around 2.  On admission 3.28 .  Repeat BMP today after hydration is 3.0  · Continue IV fluids and monitor kidney functions closely     Coronary artery disease  · Status post PCI in May 2021  · Continue ASA, plavix, statin      Hx of complete heart block   · S/p PPM     AGATHA   · CPAP at HS      Expected Discharge Location and Transportation: Home versus acute rehab  Expected Discharge Date: 9/10/2022    DVT prophylaxis:  No DVT prophylaxis order currently exists.          CODE STATUS:   Code Status and Medical Interventions:   Ordered at: 09/05/22 0599     Level Of Support Discussed With:    Patient     Code Status (Patient has no pulse and is not breathing):    CPR (Attempt to Resuscitate)     Medical Interventions (Patient has pulse or is breathing):    Full Support       Liborio Allen MD  09/06/22

## 2022-09-06 NOTE — PLAN OF CARE
Goal Outcome Evaluation:      SLP evaluation completed. Will sign-off for dysphagia. Please see note for further details and recommendations.

## 2022-09-07 PROBLEM — R13.10 ODYNOPHAGIA: Status: ACTIVE | Noted: 2022-09-05

## 2022-09-07 LAB
ALBUMIN SERPL-MCNC: 2.9 G/DL (ref 3.5–5.2)
ALBUMIN/GLOB SERPL: 0.9 G/DL
ALP SERPL-CCNC: 140 U/L (ref 39–117)
ALT SERPL W P-5'-P-CCNC: 75 U/L (ref 1–41)
ANION GAP SERPL CALCULATED.3IONS-SCNC: 17 MMOL/L (ref 5–15)
AST SERPL-CCNC: 75 U/L (ref 1–40)
BASOPHILS # BLD AUTO: 0.04 10*3/MM3 (ref 0–0.2)
BASOPHILS NFR BLD AUTO: 0.2 % (ref 0–1.5)
BILIRUB SERPL-MCNC: 0.2 MG/DL (ref 0–1.2)
BUN SERPL-MCNC: 71 MG/DL (ref 8–23)
BUN/CREAT SERPL: 24.4 (ref 7–25)
CALCIUM SPEC-SCNC: 8.5 MG/DL (ref 8.6–10.5)
CHLORIDE SERPL-SCNC: 103 MMOL/L (ref 98–107)
CO2 SERPL-SCNC: 17 MMOL/L (ref 22–29)
CREAT SERPL-MCNC: 2.91 MG/DL (ref 0.76–1.27)
DEPRECATED RDW RBC AUTO: 43.1 FL (ref 37–54)
EGFRCR SERPLBLD CKD-EPI 2021: 20.7 ML/MIN/1.73
EOSINOPHIL # BLD AUTO: 0.37 10*3/MM3 (ref 0–0.4)
EOSINOPHIL NFR BLD AUTO: 2.2 % (ref 0.3–6.2)
ERYTHROCYTE [DISTWIDTH] IN BLOOD BY AUTOMATED COUNT: 14.8 % (ref 12.3–15.4)
GLOBULIN UR ELPH-MCNC: 3.4 GM/DL
GLUCOSE BLDC GLUCOMTR-MCNC: 114 MG/DL (ref 70–130)
GLUCOSE BLDC GLUCOMTR-MCNC: 128 MG/DL (ref 70–130)
GLUCOSE BLDC GLUCOMTR-MCNC: 149 MG/DL (ref 70–130)
GLUCOSE BLDC GLUCOMTR-MCNC: 212 MG/DL (ref 70–130)
GLUCOSE SERPL-MCNC: 158 MG/DL (ref 65–99)
HCT VFR BLD AUTO: 32.7 % (ref 37.5–51)
HCT VFR BLD AUTO: 34.9 % (ref 37.5–51)
HCT VFR BLD AUTO: 35.8 % (ref 37.5–51)
HCT VFR BLD AUTO: 37.2 % (ref 37.5–51)
HGB BLD-MCNC: 11.7 G/DL (ref 13–17.7)
HGB BLD-MCNC: 11.9 G/DL (ref 13–17.7)
HGB BLD-MCNC: 12 G/DL (ref 13–17.7)
HGB BLD-MCNC: 12.5 G/DL (ref 13–17.7)
IMM GRANULOCYTES # BLD AUTO: 0.36 10*3/MM3 (ref 0–0.05)
IMM GRANULOCYTES NFR BLD AUTO: 2.1 % (ref 0–0.5)
LYMPHOCYTES # BLD AUTO: 2.08 10*3/MM3 (ref 0.7–3.1)
LYMPHOCYTES NFR BLD AUTO: 12.4 % (ref 19.6–45.3)
MAGNESIUM SERPL-MCNC: 1.9 MG/DL (ref 1.6–2.4)
MCH RBC QN AUTO: 33.9 PG (ref 26.6–33)
MCHC RBC AUTO-ENTMCNC: 36.7 G/DL (ref 31.5–35.7)
MCV RBC AUTO: 92.4 FL (ref 79–97)
MONOCYTES # BLD AUTO: 1.4 10*3/MM3 (ref 0.1–0.9)
MONOCYTES NFR BLD AUTO: 8.3 % (ref 5–12)
NEUTROPHILS NFR BLD AUTO: 12.59 10*3/MM3 (ref 1.7–7)
NEUTROPHILS NFR BLD AUTO: 74.8 % (ref 42.7–76)
NRBC BLD AUTO-RTO: 0 /100 WBC (ref 0–0.2)
PHOSPHATE SERPL-MCNC: 3.5 MG/DL (ref 2.5–4.5)
PLATELET # BLD AUTO: 396 10*3/MM3 (ref 140–450)
PMV BLD AUTO: 10.5 FL (ref 6–12)
POTASSIUM SERPL-SCNC: 5.2 MMOL/L (ref 3.5–5.2)
PROT SERPL-MCNC: 6.3 G/DL (ref 6–8.5)
RBC # BLD AUTO: 3.54 10*6/MM3 (ref 4.14–5.8)
SODIUM SERPL-SCNC: 137 MMOL/L (ref 136–145)
WBC NRBC COR # BLD: 16.84 10*3/MM3 (ref 3.4–10.8)

## 2022-09-07 PROCEDURE — 25010000002 PIPERACILLIN SOD-TAZOBACTAM PER 1 G: Performed by: NURSE PRACTITIONER

## 2022-09-07 PROCEDURE — 63710000001 INSULIN DETEMIR PER 5 UNITS: Performed by: INTERNAL MEDICINE

## 2022-09-07 PROCEDURE — 97161 PT EVAL LOW COMPLEX 20 MIN: CPT

## 2022-09-07 PROCEDURE — 80053 COMPREHEN METABOLIC PANEL: CPT | Performed by: INTERNAL MEDICINE

## 2022-09-07 PROCEDURE — 97116 GAIT TRAINING THERAPY: CPT

## 2022-09-07 PROCEDURE — 82962 GLUCOSE BLOOD TEST: CPT

## 2022-09-07 PROCEDURE — 84100 ASSAY OF PHOSPHORUS: CPT | Performed by: INTERNAL MEDICINE

## 2022-09-07 PROCEDURE — 99226 PR SBSQ OBSERVATION CARE/DAY 35 MINUTES: CPT | Performed by: INTERNAL MEDICINE

## 2022-09-07 PROCEDURE — G0378 HOSPITAL OBSERVATION PER HR: HCPCS

## 2022-09-07 PROCEDURE — 99222 1ST HOSP IP/OBS MODERATE 55: CPT | Performed by: PHYSICIAN ASSISTANT

## 2022-09-07 PROCEDURE — 83735 ASSAY OF MAGNESIUM: CPT | Performed by: INTERNAL MEDICINE

## 2022-09-07 PROCEDURE — 94799 UNLISTED PULMONARY SVC/PX: CPT

## 2022-09-07 PROCEDURE — 96366 THER/PROPH/DIAG IV INF ADDON: CPT

## 2022-09-07 PROCEDURE — 85014 HEMATOCRIT: CPT | Performed by: NURSE PRACTITIONER

## 2022-09-07 PROCEDURE — 85018 HEMOGLOBIN: CPT | Performed by: NURSE PRACTITIONER

## 2022-09-07 PROCEDURE — 85025 COMPLETE CBC W/AUTO DIFF WBC: CPT | Performed by: INTERNAL MEDICINE

## 2022-09-07 RX ORDER — PANTOPRAZOLE SODIUM 40 MG/10ML
40 INJECTION, POWDER, LYOPHILIZED, FOR SOLUTION INTRAVENOUS
Status: DISCONTINUED | OUTPATIENT
Start: 2022-09-08 | End: 2022-09-10

## 2022-09-07 RX ADMIN — ROSUVASTATIN CALCIUM 10 MG: 10 TABLET, COATED ORAL at 21:42

## 2022-09-07 RX ADMIN — Medication 10 ML: at 08:35

## 2022-09-07 RX ADMIN — AMLODIPINE BESYLATE 2.5 MG: 2.5 TABLET ORAL at 08:34

## 2022-09-07 RX ADMIN — PIOGLITAZONE 30 MG: 15 TABLET ORAL at 08:35

## 2022-09-07 RX ADMIN — IPRATROPIUM BROMIDE AND ALBUTEROL SULFATE 3 ML: .5; 3 SOLUTION RESPIRATORY (INHALATION) at 19:20

## 2022-09-07 RX ADMIN — CLOPIDOGREL BISULFATE 75 MG: 75 TABLET ORAL at 08:35

## 2022-09-07 RX ADMIN — FAMOTIDINE 20 MG: 20 TABLET ORAL at 23:21

## 2022-09-07 RX ADMIN — IPRATROPIUM BROMIDE AND ALBUTEROL SULFATE 3 ML: .5; 3 SOLUTION RESPIRATORY (INHALATION) at 02:56

## 2022-09-07 RX ADMIN — METOPROLOL SUCCINATE 12.5 MG: 25 TABLET, EXTENDED RELEASE ORAL at 08:34

## 2022-09-07 RX ADMIN — ASPIRIN 81 MG: 81 TABLET, COATED ORAL at 08:34

## 2022-09-07 RX ADMIN — FINASTERIDE 5 MG: 5 TABLET, FILM COATED ORAL at 08:35

## 2022-09-07 RX ADMIN — LATANOPROST 1 DROP: 50 SOLUTION OPHTHALMIC at 21:43

## 2022-09-07 RX ADMIN — CETIRIZINE HYDROCHLORIDE 10 MG: 10 TABLET, FILM COATED ORAL at 08:34

## 2022-09-07 RX ADMIN — TAZOBACTAM SODIUM AND PIPERACILLIN SODIUM 3.38 G: 375; 3 INJECTION, SOLUTION INTRAVENOUS at 06:38

## 2022-09-07 RX ADMIN — TIMOLOL MALEATE 1 DROP: 5 SOLUTION/ DROPS OPHTHALMIC at 21:43

## 2022-09-07 RX ADMIN — INSULIN DETEMIR 50 UNITS: 100 INJECTION, SOLUTION SUBCUTANEOUS at 21:42

## 2022-09-07 RX ADMIN — TIMOLOL MALEATE 1 DROP: 5 SOLUTION/ DROPS OPHTHALMIC at 08:35

## 2022-09-07 RX ADMIN — IPRATROPIUM BROMIDE AND ALBUTEROL SULFATE 3 ML: .5; 3 SOLUTION RESPIRATORY (INHALATION) at 23:39

## 2022-09-07 RX ADMIN — TAZOBACTAM SODIUM AND PIPERACILLIN SODIUM 3.38 G: 375; 3 INJECTION, SOLUTION INTRAVENOUS at 21:42

## 2022-09-07 RX ADMIN — INSULIN DETEMIR 50 UNITS: 100 INJECTION, SOLUTION SUBCUTANEOUS at 08:36

## 2022-09-07 RX ADMIN — IPRATROPIUM BROMIDE AND ALBUTEROL SULFATE 3 ML: .5; 3 SOLUTION RESPIRATORY (INHALATION) at 08:05

## 2022-09-07 RX ADMIN — TAZOBACTAM SODIUM AND PIPERACILLIN SODIUM 3.38 G: 375; 3 INJECTION, SOLUTION INTRAVENOUS at 13:47

## 2022-09-07 RX ADMIN — IPRATROPIUM BROMIDE AND ALBUTEROL SULFATE 3 ML: .5; 3 SOLUTION RESPIRATORY (INHALATION) at 11:18

## 2022-09-07 RX ADMIN — Medication 10 ML: at 21:42

## 2022-09-07 NOTE — DISCHARGE PLACEMENT REQUEST
"Case Management [phone]    Ju Velarde (82 y.o. Male)             Date of Birth   1940    Social Security Number       Address   [address] [place], [place] [address]    Home Phone   [phone]    MRN   [id number]       Rastafari   Voodoo    Marital Status                               Admission Date   22    Admission Type   Emergency    Admitting Provider   Liborio Allen MD    Attending Provider   Liborio Allen MD    Department, Room/Bed   [address], S338/1       Discharge Date       Discharge Disposition       Discharge Destination                               Attending Provider: Liborio Allen MD    Allergies: No Known Allergies    Isolation: None   Infection: None   Code Status: CPR   Advance Care Planning Activity    Ht: 185.4 cm (73\")   Wt: 96.1 kg (211 lb 14.4 oz)    Admission Cmt: None   Principal Problem: Odynophagia [R13.10] More...                 Active Insurance as of 2022     Primary Coverage     Payor Plan Insurance Group Employer/Plan Group    HUMANA MEDICARE REPLACEMENT HU[employer]NA MEDICARE REPLACEMENT [id number]     Payor Plan Address Payor Plan Phone Number Payor Plan Fax Number Effective Dates    [address] [address] [phone]  2013 - None Entered    [facility] [address]       Subscriber Name Subscriber Birth Date Member ID       JU VELARDE 1940 [id number]                 Emergency Contacts      (Rel.) Home Phone Work Phone Mobile Phone    Ron Velarde (Son) [phone] -- --    Kathi Lopez (Daughter) -- -- [phone]          [address] [address]  Phone:  [phone]  Fax:  [phone] Date: Sep 7, 2022      Ambulatory Referral to Physical Therapy Evaluate and treat     Patient:  Ju Velarde MRN:  [id number]   [address] [place] [id number] :  1940  SSN:    Phone: [phone] Sex:  M      INSURANCE " PAYOR PLAN GROUP # SUBSCRIBER ID   Primary:    HU[employer] MEDICARE REPLACEMENT [id number]      Referring Provider Information:  LIBORIO ALLEN Phone: [phone] Fax: [phone]       Referral Information:   # Visits:  1 Referral Type: Physical Therapy [AE1]   Urgency:  Routine Referral Reason: Specialty Services Required   Start Date: Sep 7, 2022 End Date:  To be determined by Insurer   Diagnosis: Dyspnea and respiratory abnormalities (R06.00,R06.89 [ICD-10-CM] 786.09 [ICD-9-CM])  Weakness (R53.1 [ICD-10-CM] 780.79 [ICD-9-CM])      Refer to Dept:   Refer to Provider:   Refer to Provider Phone:   Refer to Facility:       Specialty needed: Evaluate and treat  Follow-up needed: Yes     This document serves as a request of services and does not constitute Insurance authorization or approval of services.  To determine eligibility, please contact the members Insurance carrier to verify and review coverage.     If you have medical questions regarding this request for services. Please contact [address] at [phone] during normal business hours.        Authorizing Provider:Liborio Allen MD  Authorizing Provider's NPI: [id number]  Order Entered By: Jacqueline Holland RN 9/7/2022  2:51 PM     Electronically signed by: Liborio Allen MD 9/7/2022  2:51 PM

## 2022-09-07 NOTE — PLAN OF CARE
Goal Outcome Evaluation:  Plan of Care Reviewed With: patient        Progress: improving  Outcome Evaluation: PT eval completed. Patient presents w/ generalized weakness and decreased functional endurance. He ambulated 200 in hallway w/ SBA on RA, SpO2 96%. Patient would benefit from acute PT to improve strength, activity tolerance, and promote return to PLOF. Recommend home w/ OPPT when medically appropriate.

## 2022-09-07 NOTE — CONSULTS
[facility]    Referring Provider: No ref. provider found    PCP: Leonard Silva MBBS    Reason for Consultation: Esophageal dysphagia    History of present illness:    Pro Pla is a 82 y.o. male, PMH includes COPD, T2DM, CKD, CAD, HTN, HL is admitted via ED 9/5 for evaluation of sepsis secondary to PNA.     Pt admits to one week h/o worsened odynophagia. He denies bolus sensation, choking or aspiration. He does note worsened cough over the last few weeks, particularly postprandially or at night. He does take omeprazole daily at home with good control of GERD sx. He otherwise denies recent changes to medications, diet, etc. He does use albuterol frequently at home. He denies previous EGD. No oral anticoagulant use at home, other than 81mg ASA daily.     SLP evaluation yesterday reveals no suspected pharyngeal impairment, suspected esophageal impairment.     Patient denies associated fever, chills, abdominal pain, indigestion, nausea, vomiting, diarrhea, constipation, hematemesis, hematochezia, melena, bloating, weight loss or gain, dysuria, jaundice or bruising.    Patient denies personal or FHx of PUD, H Pylori, gastritis, pancreatitis, colitis, Celiac disease, UC, Crohn's disease, IBS, colon or gastric cancers. Pt denies EtOH, tobacco, illicit substance or NSAID use.    Allergies:  Patient has no known allergies.    Scheduled Meds:  amLODIPine, 2.5 mg, Oral, Daily  aspirin, 81 mg, Oral, Daily  cetirizine, 10 mg, Oral, Daily  clopidogrel, 75 mg, Oral, Daily  finasteride, 5 mg, Oral, Daily  insulin detemir, 50 Units, Subcutaneous, Q12H  insulin lispro, 0-24 Units, Subcutaneous, TID AC  ipratropium-albuterol, 3 mL, Nebulization, Q4H - RT  latanoprost, 1 drop, Both Eyes, Nightly  metoprolol succinate XL, 12.5 mg, Oral, Q24H  pioglitazone, 30 mg, Oral, Daily  piperacillin-tazobactam, 3.375 g, Intravenous, Q8H  rosuvastatin, 10 mg, Oral, Nightly  sodium chloride, 10 mL, Intravenous,  Q12H  timolol, 1 drop, Both Eyes, BID         Infusions:  lactated ringers, 100 mL/hr, Last Rate: 100 mL/hr (09/06/22 8240)        PRN Meds:  •  acetaminophen **OR** acetaminophen **OR** acetaminophen  •  albuterol  •  dextrose  •  dextrose  •  dextrose  •  dextrose  •  famotidine  •  glucagon (human recombinant)  •  glucagon (human recombinant)    Home Meds:  Medications Prior to Admission   Medication Sig Dispense Refill Last Dose   • amLODIPine (NORVASC) 2.5 MG tablet Take 1 tablet by mouth Daily. 30 tablet 11    • aspirin 81 MG EC tablet Take 81 mg by mouth Daily.      • capsaicin (ZOSTRIX) 0.025 % cream Apply 1 application topically to the appropriate area as directed 2 (Two) Times a Day As Needed.      • clopidogrel (PLAVIX) 75 MG tablet Take 1 tablet by mouth Daily. 30 tablet 11    • Dulaglutide (Trulicity) 1.5 MG/0.5ML solution pen-injector Inject 1.5 mg under the skin into the appropriate area as directed 1 (One) Time Per Week.      • finasteride (PROSCAR) 5 MG tablet Take 5 mg by mouth Daily.      • Insulin Regular Human (HUMULIN R U-500 KWIKPEN SC) Inject  under the skin into the appropriate area as directed 2 (Two) Times a Day With Meals. 135 units with breakfast and 125 units with dinner      • latanoprost (XALATAN) 0.005 % ophthalmic solution Administer 1 drop to both eyes Every Night.      • lisinopril (PRINIVIL,ZESTRIL) 2.5 MG tablet Take 1 tablet by mouth Daily. 30 tablet 11    • metoprolol succinate XL (TOPROL-XL) 25 MG 24 hr tablet Take 0.5 tablets by mouth Daily. 30 tablet 11    • rosuvastatin (CRESTOR) 10 MG tablet Take 1 tablet by mouth Every Night. 30 tablet 11    • tamsulosin (FLOMAX) 0.4 MG capsule 24 hr capsule Take 1 capsule by mouth Daily.      • acetaminophen (TYLENOL) 500 MG tablet Take 500 mg by mouth Every 6 (Six) Hours As Needed for Mild Pain .      • albuterol sulfate  (90 Base) MCG/ACT inhaler Inhale 2 puffs Every 4 (Four) Hours As Needed for Wheezing.      • famotidine  (PEPCID) 20 MG tablet Take 20 mg by mouth 2 (Two) Times a Day As Needed for Heartburn.      • loratadine (CLARITIN) 10 MG tablet Take 10 mg by mouth As Needed for Allergies.      • MAGNESIUM GLUCONATE PO Take 400 mg by mouth Daily.      • nitroglycerin (NITROSTAT) 0.4 MG SL tablet 1 under the tongue as needed for angina, may repeat q5mins for up three doses 100 tablet 11    • Omega-3 Fatty Acids (fish oil) 1000 MG capsule capsule Take 1,000 mg by mouth 2 (Two) Times a Day With Meals.      • timolol (TIMOPTIC) 0.5 % ophthalmic solution Administer 1 drop to both eyes 2 (Two) Times a Day.          ROS: Review of Systems   Constitutional: Negative for chills, diaphoresis, fatigue and unexpected weight change.   HENT: Positive for trouble swallowing. Negative for drooling, facial swelling, mouth sores, nosebleeds, rhinorrhea, sore throat, tinnitus and voice change.    Respiratory: Positive for cough. Negative for chest tightness and shortness of breath.    Cardiovascular: Negative for chest pain, palpitations and leg swelling.   Gastrointestinal: Negative for abdominal distention, abdominal pain, blood in stool, constipation, diarrhea, nausea and vomiting.   Genitourinary: Negative for dysuria, flank pain and hematuria.   Musculoskeletal: Negative for arthralgias, joint swelling and myalgias.   Skin: Negative for color change, pallor and rash.   Neurological: Negative for dizziness, tremors, syncope, weakness and light-headedness.   Psychiatric/Behavioral: Negative for confusion and hallucinations.   All other systems reviewed and are negative.      PAST MED HX:  Past Medical History:   Diagnosis Date   • CAD (coronary artery disease) 9/5/2022   • COPD (chronic obstructive pulmonary disease) (HCC)    • Diabetes mellitus (HCC)    • Dyslipidemia 5/24/2021   • HTN 5/24/2021   • Sleep apnea    • STEMI 5/24/2021       PAST SURG HX:  Past Surgical History:   Procedure Laterality Date   • CARDIAC CATHETERIZATION N/A 5/24/2021  "   Procedure: Left Heart Cath;  Surgeon: Elisha Bettencourt MD;  Location: [facility] CATH INVASIVE LOCATION;  Service: Cardiology;  Laterality: N/A;       FAM HX:  Family History   Problem Relation Age of Onset   • Heart disease Father        SOC HX:  Social History     Socioeconomic History   • Marital status:    Tobacco Use   • Smoking status: Former Smoker     Packs/day: 2.00     Types: Cigarettes   • Smokeless tobacco: Former User   • Tobacco comment: Quit 25 years ago   Vaping Use   • Vaping Use: Never used   Substance and Sexual Activity   • Alcohol use: Never   • Drug use: Never   • Sexual activity: Defer       PHYSICAL EXAM  /89 (BP Location: Right arm, Patient Position: Sitting)   Pulse 72   Temp 98.1 °F (36.7 °C) (Oral)   Resp 17   Ht 185.4 cm (73\")   Wt 96.1 kg (211 lb 14.4 oz)   SpO2 91%   BMI 27.96 kg/m²   Wt Readings from Last 3 Encounters:   09/05/22 96.1 kg (211 lb 14.4 oz)   01/27/22 107 kg (235 lb)   07/22/21 103 kg (227 lb)   ,body mass index is 27.96 kg/m².  Physical Exam  Vitals and nursing note reviewed.   Constitutional:       General: He is not in acute distress.     Appearance: Normal appearance. He is obese. He is ill-appearing (chronically). He is not diaphoretic.   HENT:      Head: Normocephalic and atraumatic.      Right Ear: External ear normal.      Left Ear: External ear normal.      Nose: Nose normal. No rhinorrhea.      Mouth/Throat:      Mouth: Mucous membranes are moist.      Pharynx: Oropharynx is clear. No posterior oropharyngeal erythema.   Eyes:      General:         Right eye: No discharge.         Left eye: No discharge.      Conjunctiva/sclera: Conjunctivae normal.      Pupils: Pupils are equal, round, and reactive to light.   Cardiovascular:      Rate and Rhythm: Normal rate and regular rhythm.      Pulses: Normal pulses.      Heart sounds: No murmur heard.    No friction rub.   Pulmonary:      Effort: Pulmonary effort is normal. No respiratory " distress.      Breath sounds: Normal breath sounds.   Abdominal:      General: Abdomen is flat. There is no distension.      Tenderness: There is no abdominal tenderness. There is no guarding or rebound.   Musculoskeletal:         General: Normal range of motion.      Cervical back: Normal range of motion and neck supple. No rigidity.   Skin:     General: Skin is warm and dry.      Capillary Refill: Capillary refill takes less than 2 seconds.      Coloration: Skin is not jaundiced or pale.   Neurological:      General: No focal deficit present.      Mental Status: He is alert and oriented to person, place, and time. Mental status is at baseline.   Psychiatric:         Mood and Affect: Mood normal.         Thought Content: Thought content normal.         Judgment: Judgment normal.         Results Review:   I reviewed the patient's new clinical results.  I reviewed the patient's new imaging results and agree with the interpretation.  I reviewed the patient's other test results and agree with the interpretation    Lab Results   Component Value Date    WBC 16.84 (H) 09/07/2022    HGB 12.0 (L) 09/07/2022    HGB 10.9 (L) 09/06/2022    HGB 10.4 (L) 09/06/2022    HCT 32.7 (L) 09/07/2022    MCV 92.4 09/07/2022     09/07/2022       No results found for: INR    Lab Results   Component Value Date    GLUCOSE 169 (H) 09/06/2022    BUN 77 (H) 09/06/2022    CREATININE 2.98 (H) 09/06/2022    EGFRIFNONA 31 (L) 05/24/2022    EGFRIFAFRI 37 (L) 05/24/2022    BCR 25.8 (H) 09/06/2022     (L) 09/06/2022    K 4.8 09/06/2022    CO2 22.0 09/06/2022    CALCIUM 8.9 09/06/2022    ALBUMIN 3.50 09/05/2022    ALKPHOS 130 (H) 09/05/2022    BILITOT 0.4 09/05/2022    ALT 29 09/05/2022    AST 35 09/05/2022       ASSESSMENTS/PLANS    Odynophagia  Sepsis 2/2 UTI  Poorly controlled T2DM, A1c 11.8  CKD   - differential includes candida esophagitis, reflux esophagitis, esophageal spasm / stenosis   - NPO at midnight, plan for EGD tomorrow with  Dr. Gillis   - pantoprazole 40mg IV daily ordered   - continue diet / liquid modifications per SLP orders      I discussed the patient's findings and my recommendations with patient. Thank you very kindly for this consultation. Will continue to follow during this hospitalization.      Theresa Tinsley PA-C  09/07/22  13:06 EDT

## 2022-09-07 NOTE — THERAPY EVALUATION
Patient Name: Pro Pal  : 1940    MRN: [id number]                              Today's Date: 2022       Admit Date: 2022    Visit Dx:     ICD-10-CM ICD-9-CM   1. Dyspnea and respiratory abnormalities  R06.00 786.09    R06.89    2. Bandemia  D72.825 288.66   3. Renal insufficiency  N28.9 593.9   4. Cough  R05.9 786.2   5. Chronic obstructive pulmonary disease, unspecified COPD type ([facility])  J44.9 496   6. Hyperglycemia  R73.9 790.29     Patient Active Problem List   Diagnosis   • STEMI   • COPD   • T2DM   • Dyslipidemia   • AGATHA on CPAP   • Enlarged prostate requiring self-catheterization   • Complete heart block   • HTN   • Gross hematuria   • Acute UTI (urinary tract infection)   • Sepsis ([facility])   • Type 2 diabetes mellitus with hyperglycemia ([facility])   • Pneumonia   • CAD (coronary artery disease)   • Hyperkalemia   • Acute renal failure superimposed on stage 3 chronic kidney disease ([facility])   • Dyspnea and respiratory abnormalities   • Self-catheterizes urinary bladder   • Acute UTI (urinary tract infection)     Past Medical History:   Diagnosis Date   • CAD (coronary artery disease) 2022   • COPD (chronic obstructive pulmonary disease) ([facility])    • Diabetes mellitus ([facility])    • Dyslipidemia 2021   • HTN 2021   • Sleep apnea    • STEMI 2021     Past Surgical History:   Procedure Laterality Date   • CARDIAC CATHETERIZATION N/A 2021    Procedure: Left Heart Cath;  Surgeon: Elisha Bettencourt MD;  Location: [facility] CATH INVASIVE LOCATION;  Service: Cardiology;  Laterality: N/A;      General Information     Row Name 22 0854          Physical Therapy Time and Intention    Document Type evaluation  -MB     Mode of Treatment physical therapy  -MB     Row Name 22 0854          General Information    Patient Profile Reviewed yes  -MB     Prior Level of Function independent:;bed mobility;ADL's;transfer;gait;all household mobility;community mobility;driving;using stairs   -MB     Existing Precautions/Restrictions oxygen therapy device and L/min  -MB     Barriers to Rehab none identified  -MB     Row Name 09/07/22 0854          Living Environment    People in Home alone  -MB     Row Name 09/07/22 0854          Home Main Entrance    Number of Stairs, Main Entrance two  -MB     Stair Railings, Main Entrance railings on both sides of stairs  -MB     Row Name 09/07/22 0854          Stairs Within Home, Primary    Number of Stairs, Within Home, Primary none  -MB     Row Name 09/07/22 0854          Cognition    Orientation Status (Cognition) oriented x 4  -MB     Row Name 09/07/22 0854          Safety Issues, Functional Mobility    Impairments Affecting Function (Mobility) endurance/activity tolerance;strength  -MB           User Key  (r) = Recorded By, (t) = Taken By, (c) = Cosigned By    Initials Name Provider Type    Evelia Valero, PT Physical Therapist               Mobility     Row Name 09/07/22 1005          Bed Mobility    Bed Mobility supine-sit  -MB     Supine-Sit [place] (Bed Mobility) minimum assist (75% patient effort)  -MB     Assistive Device (Bed Mobility) head of bed elevated  -MB     Comment, (Bed Mobility) Pt. required assist to raise trunk/shoulders to upright sitting EOB.  -MB     Row Name 09/07/22 1005          Transfers    Comment, (Transfers) No LOB or instability w/ transfers. Pt. demo safe and appropriate transfer technique.  -MB     Row Name 09/07/22 1005          Sit-Stand Transfer    Sit-Stand [place]eline (Transfers) standby assist  -MB     Row Name 09/07/22 1005          Gait/Stairs (Locomotion)    [place] Level (Gait) standby assist  -MB     Assistive Device (Gait) other (see comments)  IV pole (pt. declined HHA or AD)  -MB     Distance in Feet (Gait) 200  -MB     Deviations/Abnormal Patterns (Gait) anand decreased;gait speed decreased  -MB     Comment, (Gait/Stairs) Pt. ambulated w/ step through gait mechanics w/ slower anand. No LOB or  instability observed. Gait distance limited by fatigue. SpO2 96% on RA post ambulation.  -MB           User Key  (r) = Recorded By, (t) = Taken By, (c) = Cosigned By    Initials Name Provider Type    Evelia Valero, PT Physical Therapist               Obj/Interventions     Row Name 09/07/22 1008          Range of Motion Comprehensive    General Range of Motion bilateral lower extremity ROM WFL;upper extremity range of motion deficits identified  -MB     Comment, General Range of Motion BLEs WFL (B hamstring tightness noted), B shoulder flex mildly limited, but WFL  -MB     Row Name 09/07/22 1008          Strength Comprehensive (MMT)    General Manual Muscle Testing (MMT) Assessment lower extremity strength deficits identified;upper extremity strength deficits identified  -MB     Comment, General Manual Muscle Testing (MMT) Assessment BLEs and BUEs grossly 4/5  -MB     Row Name 09/07/22 1008          Balance    Balance Assessment sitting static balance;standing static balance;standing dynamic balance  -MB     Static Sitting Balance independent  -MB     Position, Sitting Balance unsupported;sitting edge of bed  -MB     Static Standing Balance independent  -MB     Dynamic Standing Balance supervision  -MB     Position/Device Used, Standing Balance unsupported  -MB     Balance Interventions standing;sit to stand;weight shifting activity  -MB     Row Name 09/07/22 1008          Sensory Assessment (Somatosensory)    Sensory Assessment (Somatosensory) bilateral LE  -MB     Bilateral LE Sensory Assessment light touch localization;light touch awareness;impaired  -MB           User Key  (r) = Recorded By, (t) = Taken By, (c) = Cosigned By    Initials Name Provider Type    Evelia Valero, PT Physical Therapist               Goals/Plan     Row Name 09/07/22 1015          Bed Mobility Goal 1 (PT)    Activity/Assistive Device (Bed Mobility Goal 1, PT) sit to supine/supine to sit  -MB     [place] Level/Cues  Needed (Bed Mobility Goal 1, PT) independent  -MB     Time Frame (Bed Mobility Goal 1, PT) 1 week  -MB     Row Name 09/07/22 1015          Transfer Goal 1 (PT)    Activity/Assistive Device (Transfer Goal 1, PT) transfers, all  -MB     [place] Level/Cues Needed (Transfer Goal 1, PT) independent  -MB     Time Frame (Transfer Goal 1, PT) 1 week  -MB     Row Name 09/07/22 1015          Gait Training Goal 1 (PT)    Activity/Assistive Device (Gait Training Goal 1, PT) gait (walking locomotion)  -MB     [place] Level (Gait Training Goal 1, PT) independent  -MB     Distance (Gait Training Goal 1, PT) 150  -MB     Time Frame (Gait Training Goal 1, PT) 10 days  -MB     Row Name 09/07/22 1015          Stairs Goal 1 (PT)    Activity/Assistive Device (Stairs Goal 1, PT) stairs, all skills;using handrail, left;using handrail, right  -MB     [place] Level/Cues Needed (Stairs Goal 1, PT) modified independence  -MB     Number of Stairs (Stairs Goal 1, PT) 2  -MB     Time Frame (Stairs Goal 1, PT) 1 week  -MB     Row Name 09/07/22 1015          Patient Education Goal (PT)    Activity (Patient Education Goal, PT) HEP  -MB     [place]/Cues/Accuracy (Memory Goal 2, PT) demonstrates adequately  -MB     Time Frame (Patient Education Goal, PT) 1 week  -MB     Row Name 09/07/22 1015          Therapy Assessment/Plan (PT)    Planned Therapy Interventions (PT) balance training;bed mobility training;gait training;home exercise program;patient/family education;stair training;strengthening;transfer training  -MB           User Key  (r) = Recorded By, (t) = Taken By, (c) = Cosigned By    Initials Name Provider Type    Evelia Valero, PT Physical Therapist               Clinical Impression     Row Name 09/07/22 1012          Pain    Pretreatment Pain Rating 0/10 - no pain  -MB     Posttreatment Pain Rating 0/10 - no pain  -MB     Row Name 09/07/22 1012          Pain Scale: FACES Pre/Post-Treatment    Pain: FACES Scale,  Pretreatment 0-->no hurt  -MB     Posttreatment Pain Rating 0-->no hurt  -MB     Row Name 09/07/22 1012          Plan of Care Review    Plan of Care Reviewed With patient  -MB     Progress improving  -MB     Outcome Evaluation PT eval completed. Patient presents w/ generalized weakness and decreased functional endurance. He ambulated 200 in hallway w/ SBA on RA, SpO2 96%. Patient would benefit from acute PT to improve strength, activity tolerance, and promote return to PLOF. Recommend home w/ OPPT when medically appropriate.  -MB     Row Name 09/07/22 1012          Therapy Assessment/Plan (PT)    Patient/Family Therapy Goals Statement (PT) Return to PLOF.  -MB     Rehab Potential (PT) good, to achieve stated therapy goals  -MB     Criteria for Skilled Interventions Met (PT) yes;meets criteria;skilled treatment is necessary  -MB     Therapy Frequency (PT) daily  -MB     Row Name 09/07/22 1012          Vital Signs    Pre Systolic BP Rehab 132  -MB     Pre Treatment Diastolic BP 89  -MB     Intra Systolic BP Rehab 139  -MB     Intra Treatment Diastolic BP 69  -MB     Pre SpO2 (%) 95  -MB     O2 Delivery Pre Treatment nasal cannula  -MB     Intra SpO2 (%) 96  -MB     O2 Delivery Intra Treatment room air  -MB     Post SpO2 (%) 96  -MB     O2 Delivery Post Treatment room air  -MB     Pre Patient Position Supine  -MB     Intra Patient Position Standing  -MB     Post Patient Position Sitting  -MB     Row Name 09/07/22 1012          Positioning and Restraints    Pre-Treatment Position in bed  -MB     Post Treatment Position chair  -MB     In Chair notified nsg;reclined;call light within reach;encouraged to call for assist;exit alarm on;waffle cushion;legs elevated  -MB           User Key  (r) = Recorded By, (t) = Taken By, (c) = Cosigned By    Initials Name Provider Type    Evelia Valero, PT Physical Therapist               Outcome Measures     Row Name 09/07/22 1016          How much help from another person do you  currently need...    Turning from your back to your side while in flat bed without using bedrails? 4  -MB     Moving from lying on back to sitting on the side of a flat bed without bedrails? 3  -MB     Moving to and from a bed to a chair (including a wheelchair)? 3  -MB     Standing up from a chair using your arms (e.g., wheelchair, bedside chair)? 3  -MB     Climbing 3-5 steps with a railing? 3  -MB     To walk in hospital room? 3  -MB     AM-PAC 6 Clicks Score (PT) 19  -MB     Highest level of mobility 6 --> Walked 10 steps or more  -MB     Row Name 09/07/22 1016          Functional Assessment    Outcome Measure Options AM-PAC 6 Clicks Basic Mobility (PT)  -MB           User Key  (r) = Recorded By, (t) = Taken By, (c) = Cosigned By    Initials Name Provider Type    Evelia Valero, PT Physical Therapist                             Physical Therapy Education                 Title: PT OT SLP Therapies (Done)     Topic: Physical Therapy (Done)     Point: Mobility training (Done)     Learning Progress Summary           Patient Acceptance, E,D, VU by MB at 9/7/2022 1017                   Point: Body mechanics (Done)     Learning Progress Summary           Patient Acceptance, E,D, VU by MB at 9/7/2022 1017                   Point: Precautions (Done)     Learning Progress Summary           Patient Acceptance, E,D, VU by MB at 9/7/2022 1017                               User Key     Initials Effective Dates Name Provider Type Discipline    MB 06/16/21 -  Evelia Garcia, PT Physical Therapist PT              PT Recommendation and Plan  Planned Therapy Interventions (PT): balance training, bed mobility training, gait training, home exercise program, patient/family education, stair training, strengthening, transfer training  Plan of Care Reviewed With: patient  Progress: improving  Outcome Evaluation: PT eval completed. Patient presents w/ generalized weakness and decreased functional endurance. He ambulated 200  in hallway w/ SBA on RA, SpO2 96%. Patient would benefit from acute PT to improve strength, activity tolerance, and promote return to PLOF. Recommend home w/ OPPT when medically appropriate.     Time Calculation:    PT Charges     Row Name 09/07/22 1018             Time Calculation    Start Time 0854  -MB      PT Received On 09/07/22  -MB      PT Goal Re-Cert Due Date 09/17/22  -MB              Time Calculation- PT    Total Timed Code Minutes- PT 11 minute(s)  -MB              Timed Charges    62853 - Gait Training Minutes  11  -MB              Untimed Charges    PT Eval/Re-eval Minutes 39  -MB              Total Minutes    Timed Charges Total Minutes 11  -MB      Untimed Charges Total Minutes 39  -MB       Total Minutes 50  -MB            User Key  (r) = Recorded By, (t) = Taken By, (c) = Cosigned By    Initials Name Provider Type    Evelia Valero, PT Physical Therapist              Therapy Charges for Today     Code Description Service Date Service Provider Modifiers Qty    53869701937 HC GAIT TRAINING EA 15 MIN 9/7/2022 Evelia Garcia, PT GP 1    13825327518 HC PT EVAL LOW COMPLEXITY 3 9/7/2022 Evelia Garcia, PT GP 1          PT G-Codes  Outcome Measure Options: AM-PAC 6 Clicks Basic Mobility (PT)  AM-PAC 6 Clicks Score (PT): 19    Evelia Garcia PT  9/7/2022

## 2022-09-07 NOTE — CASE MANAGEMENT/SOCIAL WORK
Continued Stay Note   Pa     Patient Name: Pro Pal  MRN: [id number]  Today's Date: 9/7/2022    Admit Date: 9/5/2022     Discharge Plan     Row Name 09/07/22 1456       Plan    Plan Home    Patient/Family in Agreement with Plan yes    Plan Comments I spoke with Mr. Pal at the bedside today. PT worked with pt today and are recommending home with Outpatient PT. I discussed this with Mr. Pal, he is agreeable. Pt states that he prefers to go to [facility] OP PT, once he is medically ready for D/C. OP PT orders are placed. I spoke with [facility]. She asked that I fax orders over today and then to call them on the day of D/C to set up an appointment, [phone]. I faxed orders to [facility] today. Pt states that he has family that can transport him home at D/C. No other needs voiced or identified. CM will continue to follow.    Final Discharge Disposition Code 01 - home or self-care               Discharge Codes    No documentation.               Expected Discharge Date and Time     Expected Discharge Date Expected Discharge Time    Sep 9, 2022             Jacqueline Holland RN

## 2022-09-07 NOTE — PROGRESS NOTES
"                    Clinical Nutrition       Patient Name: Pro Pal  YOB: 1940  MRN: [id number]  Date of Encounter: 09/07/22 10:36 EDT  Admission date: 9/5/2022      Reason for Visit   Identified at risk by screening criteria, Reduced oral intake      EMR  Reviewed   Yes    Height: Height: 185.4 cm (73\")  Weight: Weight: 96.1 kg (211 lb 14.4 oz) (09/05/22 8915)  BMI: BMI (Calculated): 28    Problem:    COPD    Dyslipidemia    AGATHA on CPAP    HTN    Sepsis (HCC)    Type 2 diabetes mellitus with hyperglycemia (HCC)    Pneumonia    CAD (coronary artery disease)    Hyperkalemia    Acute renal failure superimposed on stage 3 chronic kidney disease (HCC)    Dyspnea and respiratory abnormalities    Self-catheterizes urinary bladder    Acute UTI (urinary tract infection)       Reported/Observed/Food/Nutrition Related - Comments     Per H&P, pt endorsed polydipsia, polyuria, and decreased appetite prior to admission. Pt reports recent general malaise at home with severely limited PO intake for 6 days due to nausea with emesis. During this time pt reports non-compliance with checking blood sugar and taking insulin dosing. Reports usual blood sugars between 140-200 during well days - A1c 11.8% may indicate . Endorsed increased appetite since admission. Pt knowledgeable on DM diet and management and verbalized recent mismanagement. Pt drinks Boost GC at home, agreeable to receive. Reports difficulty chewing tough meats - edentulous with full denture plate. SLP evaluated pt and cleared from Regular/thins however pt endorsed sore throat with swallowing. Endorsed continued watery black stool.      Current Nutrition Prescription     Diet Regular; Thin; Consistent Carbohydrate  Orders Placed This Encounter      Dietary Nutrition Supplements Boost Glucose Control; chocolate      Average Intake from Charting: insufficient data    Nutrition Diagnosis     Problem Limited adherence to nutrition related advice  "   Etiology DM diet   Signs/Symptoms A1c 11.8%, polydipsia, polyuria   Status:    Actions     Follow treatment progress, Encourage intake, Supplement provided     May benefit from outpatient DM education if agreeable - consult placed  Provide Boost Glucose Control TID - chocolate  If unable to tolerate regular consistency, will benefit from soft foods    Monitor Per Protocol      Kayli Cloud MS, RD, LD  Time Spent: 35min

## 2022-09-07 NOTE — PLAN OF CARE
Problem: Adult Inpatient Plan of Care  Goal: Plan of Care Review  Flowsheets (Taken 9/7/2022 1857)  Progress: improving  Plan of Care Reviewed With: patient  Outcome Evaluation: Pt alert, oriented. VSS. RA. Denied pain, discomfort. Self straight caths. Scheduled for EGD tomorrow. Plan of care discussed with pt. Verbalized understanding.  Goal: Absence of Hospital-Acquired Illness or Injury  Intervention: Identify and Manage Fall Risk  Recent Flowsheet Documentation  Taken 9/7/2022 1800 by Joshua Early RN  Safety Promotion/Fall Prevention:   activity supervised   assistive device/personal items within reach   clutter free environment maintained   lighting adjusted   nonskid shoes/slippers when out of bed   room organization consistent   toileting scheduled   safety round/check completed   gait belt  Taken 9/7/2022 1600 by Joshua Early RN  Safety Promotion/Fall Prevention:   activity supervised   assistive device/personal items within reach   clutter free environment maintained   gait belt   lighting adjusted   nonskid shoes/slippers when out of bed   room organization consistent   safety round/check completed   toileting scheduled  Taken 9/7/2022 1400 by Joshua Early RN  Safety Promotion/Fall Prevention:   activity supervised   assistive device/personal items within reach   clutter free environment maintained   lighting adjusted   gait belt   nonskid shoes/slippers when out of bed   room organization consistent   safety round/check completed   toileting scheduled  Taken 9/7/2022 1200 by Joshua Early RN  Safety Promotion/Fall Prevention:   activity supervised   assistive device/personal items within reach   clutter free environment maintained   gait belt   lighting adjusted   nonskid shoes/slippers when out of bed   room organization consistent   safety round/check completed   toileting scheduled  Taken 9/7/2022 1000 by Joshua Early RN  Safety Promotion/Fall Prevention:   assistive  device/personal items within reach   clutter free environment maintained   activity supervised   lighting adjusted   nonskid shoes/slippers when out of bed   room organization consistent   toileting scheduled   safety round/check completed  Taken 9/7/2022 0835 by Joshua Early RN  Safety Promotion/Fall Prevention:   activity supervised   assistive device/personal items within reach   clutter free environment maintained   lighting adjusted   nonskid shoes/slippers when out of bed   room organization consistent   safety round/check completed   toileting scheduled  Intervention: Prevent Skin Injury  Recent Flowsheet Documentation  Taken 9/7/2022 1800 by Joshua Early RN  Body Position:   position changed independently   right   side-lying  Taken 9/7/2022 1600 by Joshua Early RN  Body Position:   position changed independently   right   side-lying  Skin Protection:   adhesive use limited   incontinence pads utilized   tubing/devices free from skin contact  Taken 9/7/2022 1400 by Joshua Early RN  Body Position:   position changed independently   right   side-lying  Skin Protection:   adhesive use limited   incontinence pads utilized   tubing/devices free from skin contact  Taken 9/7/2022 1200 by Joshua Early RN  Body Position:   position changed independently   dangle, side of bed  Skin Protection:   adhesive use limited   incontinence pads utilized   tubing/devices free from skin contact  Taken 9/7/2022 1000 by Joshua Early RN  Body Position:   position changed independently   right   side-lying  Skin Protection:   adhesive use limited   incontinence pads utilized   tubing/devices free from skin contact  Taken 9/7/2022 0835 by Joshua Early RN  Body Position:   position changed independently   neutral body alignment  Skin Protection:   adhesive use limited   incontinence pads utilized   tubing/devices free from skin contact  Intervention: Prevent and Manage VTE (Venous  Thromboembolism) Risk  Recent Flowsheet Documentation  Taken 9/7/2022 1800 by Joshua Early RN  Activity Management: activity adjusted per tolerance  Taken 9/7/2022 1600 by Joshua Early RN  Activity Management: activity adjusted per tolerance  Taken 9/7/2022 1400 by Joshua Early RN  Activity Management: activity adjusted per tolerance  Taken 9/7/2022 1200 by Joshua Early RN  Activity Management: activity adjusted per tolerance  Taken 9/7/2022 1000 by Joshua Early RN  Activity Management: activity adjusted per tolerance  Taken 9/7/2022 0835 by Joshua Early RN  Activity Management: activity adjusted per tolerance  VTE Prevention/Management:   bilateral   sequential compression devices off  Intervention: Prevent Infection  Recent Flowsheet Documentation  Taken 9/7/2022 1800 by Joshua Early RN  Infection Prevention:   environmental surveillance performed   rest/sleep promoted   single patient room provided  Taken 9/7/2022 1600 by Joshua Early RN  Infection Prevention:   environmental surveillance performed   rest/sleep promoted   single patient room provided  Taken 9/7/2022 1400 by Joshua Early RN  Infection Prevention:   environmental surveillance performed   rest/sleep promoted   single patient room provided  Taken 9/7/2022 1200 by Joshua Early RN  Infection Prevention:   environmental surveillance performed   single patient room provided   rest/sleep promoted  Taken 9/7/2022 1000 by Joshua Early RN  Infection Prevention:   environmental surveillance performed   rest/sleep promoted   single patient room provided  Taken 9/7/2022 0835 by Joshua Early RN  Infection Prevention:   environmental surveillance performed   rest/sleep promoted   single patient room provided  Goal: Optimal Comfort and Wellbeing  Intervention: Monitor Pain and Promote Comfort  Recent Flowsheet Documentation  Taken 9/7/2022 0835 by Joshua Early RN  Pain Management  Interventions: relaxation techniques promoted  Intervention: Provide Person-Centered Care  Recent Flowsheet Documentation  Taken 9/7/2022 0835 by Joshua Early RN  Trust Relationship/Rapport:   choices provided   care explained   questions encouraged   questions answered     Problem: Fall Injury Risk  Goal: Absence of Fall and Fall-Related Injury  Intervention: Identify and Manage Contributors  Recent Flowsheet Documentation  Taken 9/7/2022 0835 by Joshua Early RN  Medication Review/Management: medications reviewed  Intervention: Promote Injury-Free Environment  Recent Flowsheet Documentation  Taken 9/7/2022 1800 by Joshua Early RN  Safety Promotion/Fall Prevention:   activity supervised   assistive device/personal items within reach   clutter free environment maintained   lighting adjusted   nonskid shoes/slippers when out of bed   room organization consistent   toileting scheduled   safety round/check completed   gait belt  Taken 9/7/2022 1600 by Joshua Early RN  Safety Promotion/Fall Prevention:   activity supervised   assistive device/personal items within reach   clutter free environment maintained   gait belt   lighting adjusted   nonskid shoes/slippers when out of bed   room organization consistent   safety round/check completed   toileting scheduled  Taken 9/7/2022 1400 by Joshua Early RN  Safety Promotion/Fall Prevention:   activity supervised   assistive device/personal items within reach   clutter free environment maintained   lighting adjusted   gait belt   nonskid shoes/slippers when out of bed   room organization consistent   safety round/check completed   toileting scheduled  Taken 9/7/2022 1200 by Joshua Early RN  Safety Promotion/Fall Prevention:   activity supervised   assistive device/personal items within reach   clutter free environment maintained   gait belt   lighting adjusted   nonskid shoes/slippers when out of bed   room organization consistent   safety  round/check completed   toileting scheduled  Taken 9/7/2022 1000 by Joshua Early, RN  Safety Promotion/Fall Prevention:   assistive device/personal items within reach   clutter free environment maintained   activity supervised   lighting adjusted   nonskid shoes/slippers when out of bed   room organization consistent   toileting scheduled   safety round/check completed  Taken 9/7/2022 0835 by Joshua Early, RN  Safety Promotion/Fall Prevention:   activity supervised   assistive device/personal items within reach   clutter free environment maintained   lighting adjusted   nonskid shoes/slippers when out of bed   room organization consistent   safety round/check completed   toileting scheduled   Goal Outcome Evaluation:  Plan of Care Reviewed With: patient        Progress: improving  Outcome Evaluation: Pt alert, oriented. VSS. RA. Denied pain, discomfort. Self straight caths. Scheduled for EGD tomorrow. Plan of care discussed with pt. Verbalized understanding.

## 2022-09-07 NOTE — PROGRESS NOTES
[facility] Medicine Services  PROGRESS NOTE    Patient Name: Pro Pal  : 1940  MRN: [id number]    Date of Admission: 2022  Primary Care Physician: Leonard Silva MBBS    Subjective   Subjective     CC:  Follow-up for shortness of breath and HHS    HPI:  I have seen and evaluated the patient this morning.  Feeling much better today.  Does not have any shortness of breath or cough.  No urinary symptoms.  Still feeling very weak.  Does not feel that he can go home yet.  Still complaining of dysphagia to solids.    ROS:  General : no fevers, chills  CVS: No chest pain, palpitations  Respiratory: No cough, dyspnea  GI: No N/V/D, abd pain  10 point review of systems is negative except for what is mentioned in HPI     Objective   Objective     Vital Signs:   Temp:  [97.8 °F (36.6 °C)-99.2 °F (37.3 °C)] 98.5 °F (36.9 °C)  Heart Rate:  [69-79] 72  Resp:  [16-23] 16  BP: (117-136)/(60-91) 130/74  Flow (L/min):  [2] 2     Physical Exam:  General: Chronically ill looking.  Conversant and pleasant.  Head: Atraumatic and normocephalic, without obvious abnormality  Eyes:   Conjunctivae and sclerae normal, no Icterus. No pallor  Ears:  Ears appear intact with no abnormalities noted  Throat: No oral lesions, no thrush, oral mucosa moist  Neck: Supple, trachea midline, no thyromegaly  Back:   No kyphoscoliosis present. No tenderness to palpation,   no sacral edema  Lungs: Clear to auscultation bilaterally, equal air entry, no wheezing or crackles  Heart:  Normal S1 and S2, no murmur, no gallop, No JVD, no lower extremity swelling  Abdomen:  Soft, suprapubic tenderness, no organomegaly, normal bowel sounds  Normal bowel sounds, no masses, no organomegaly. Soft, nontender, nondistended, no guarding, no rebound tenderness.  Extremities: No gross abnormalities, no clubbing, pulses palpable and equal bilaterally  Skin: No bleeding, bruising or rash, normal skin turgor and  elasticity  Neurologic: Cranial nerves appear intact with no evidence of facial asymmetry, normal motor and sensory functions in all 4 extremities  Psych: Alert and oriented x 3, normal mood    Results Reviewed:  LAB RESULTS:      Lab 09/07/22  0508 09/06/22 2226 09/06/22  1657 09/06/22  1335 09/06/22  1039 09/06/22  0341 09/06/22  0004 09/05/22 1911   WBC 16.84*  --   --   --   --   --  17.27* 22.80*   HEMOGLOBIN 12.0* 10.9* 10.4* 10.2* 11.9*   < > 12.0* 12.3*   HEMATOCRIT 32.7* 33.6* 31.6* 30.4* 32.5*   < > 32.7* 35.7*   PLATELETS 396  --   --   --   --   --  276 374   NEUTROS ABS 12.59*  --   --   --   --   --  14.59* 19.05*   IMMATURE GRANS (ABS) 0.36*  --   --   --   --   --  0.20* 0.29*   LYMPHS ABS 2.08  --   --   --   --   --  1.53 1.65   MONOS ABS 1.40*  --   --   --   --   --  0.83 1.72*   EOS ABS 0.37  --   --   --   --   --  0.08 0.04   MCV 92.4  --   --   --   --   --  93.7 89.3   PROCALCITONIN  --   --   --   --   --   --   --  1.32*   LACTATE  --   --   --   --   --   --   --  1.9    < > = values in this interval not displayed.         Lab 09/06/22  1039 09/06/22  0341 09/06/22  0004 09/05/22 2227 09/05/22 1911   SODIUM 135* 132* 127*  --  122*   POTASSIUM 4.8 4.5 4.8  --  5.3*   CHLORIDE 100 97* 92*  --  84*   CO2 22.0 20.0* 18.0*  --  19.0*   ANION GAP 13.0 15.0 17.0*  --  19.0*   BUN 77* 79* 83*  --  83*   CREATININE 2.98* 3.04* 3.05*  --  3.28*   EGFR 20.1* 19.7* 19.6*  --  18.0*   GLUCOSE 169* 257* 470* 578* 664*   CALCIUM 8.9 9.2 8.9  --  9.8   MAGNESIUM 2.0 2.1 2.7* 1.9  --    PHOSPHORUS 2.5 3.1 3.5 3.7  --    HEMOGLOBIN A1C  --   --   --   --  11.80*         Lab 09/05/22 1911   TOTAL PROTEIN 8.7*   ALBUMIN 3.50   GLOBULIN 5.2   ALT (SGPT) 29   AST (SGOT) 35   BILIRUBIN 0.4   ALK PHOS 130*         Lab 09/05/22 1911   PROBNP 1,048.0   TROPONIN T <0.010             Lab 09/06/22  0341 09/06/22  0004   ABO TYPING A A   RH TYPING Positive Positive   ANTIBODY SCREEN  --  Negative         Lab  09/05/22 2101   PH, ARTERIAL 7.384   PCO2, ARTERIAL 34.3*   PO2 ART 76.4*   FIO2 21   HCO3 ART 20.4   BASE EXCESS ART -4.0*   CARBOXYHEMOGLOBIN 1.0     Brief Urine Lab Results  (Last result in the past 365 days)      Color   Clarity   Blood   Leuk Est   Nitrite   Protein   CREAT   Urine HCG        09/05/22 2221 Yellow   Cloudy   Moderate (2+)   Small (1+)   Negative   100 mg/dL (2+)                 Microbiology Results Abnormal     Procedure Component Value - Date/Time    Blood Culture - Blood, Arm, Left [[id number]]  (Normal) Collected: 09/05/22 2020    Lab Status: Preliminary result Specimen: Blood from Arm, Left Updated: 09/06/22 2102     Blood Culture No growth at 24 hours    Blood Culture - Blood, Wrist, Right [[id number]]  (Normal) Collected: 09/05/22 2015    Lab Status: Preliminary result Specimen: Blood from Wrist, Right Updated: 09/06/22 2102     Blood Culture No growth at 24 hours    S. Pneumo Ag Urine or CSF - Urine, Urine, Clean Catch [[id number]]  (Normal) Collected: 09/06/22 0159    Lab Status: Final result Specimen: Urine, Clean Catch Updated: 09/06/22 0929     Strep Pneumo Ag Negative    Legionella Antigen, Urine - Urine, Urine, Clean Catch [[id number]]  (Normal) Collected: 09/06/22 0159    Lab Status: Final result Specimen: Urine, Clean Catch Updated: 09/06/22 0929     LEGIONELLA ANTIGEN, URINE Negative    MRSA Screen, PCR (Inpatient) - Swab, Nares [[id number]]  (Normal) Collected: 09/06/22 0619    Lab Status: Final result Specimen: Swab from Nares Updated: 09/06/22 0813     MRSA PCR Negative    Narrative:      The negative predictive value of this diagnostic test is high and should only be used to consider de-escalating anti-MRSA therapy. A positive result may indicate colonization with MRSA and must be correlated clinically.  MRSA Negative    Respiratory Panel PCR w/COVID-19(SARS-CoV-2) TREASURE/MAIKOL/ELTON/PAD/COR/MAD/KISHORE In-House, NP Swab in UTM/VTM, 3-4 HR TAT - Swab, Nasopharynx [[id number]]  (Normal)  Collected: 09/06/22 0619    Lab Status: Final result Specimen: Swab from Nasopharynx Updated: 09/06/22 0750     ADENOVIRUS, PCR Not Detected     Coronavirus 229E Not Detected     Coronavirus HKU1 Not Detected     Coronavirus NL63 Not Detected     Coronavirus OC43 Not Detected     COVID19 Not Detected     Human Metapneumovirus Not Detected     Human Rhinovirus/Enterovirus Not Detected     Influenza A PCR Not Detected     Influenza B PCR Not Detected     Parainfluenza Virus 1 Not Detected     Parainfluenza Virus 2 Not Detected     Parainfluenza Virus 3 Not Detected     Parainfluenza Virus 4 Not Detected     RSV, PCR Not Detected     Bordetella pertussis pcr Not Detected     Bordetella parapertussis PCR Not Detected     Chlamydophila pneumoniae PCR Not Detected     Mycoplasma pneumo by PCR Not Detected    Narrative:      In the setting of a positive respiratory panel with a viral infection PLUS a negative procalcitonin without other underlying concern for bacterial infection, consider observing off antibiotics or discontinuation of antibiotics and continue supportive care. If the respiratory panel is positive for atypical bacterial infection (Bordetella pertussis, Chlamydophila pneumoniae, or Mycoplasma pneumoniae), consider antibiotic de-escalation to target atypical bacterial infection.    COVID PRE-OP / PRE-PROCEDURE SCREENING ORDER (NO ISOLATION) - Swab, Nasopharynx [[id number]]  (Normal) Collected: 09/1940    Lab Status: Final result Specimen: Swab from Nasopharynx Updated: 09/05/22 2011    Narrative:      The following orders were created for panel order COVID PRE-OP / PRE-PROCEDURE SCREENING ORDER (NO ISOLATION) - Swab, Nasopharynx.  Procedure                               Abnormality         Status                     ---------                               -----------         ------                     COVID-19 and FLU A/B PCR...[[id number]]  Normal              Final result                 Please view  results for these tests on the individual orders.    COVID-19 and FLU A/B PCR - Swab, Nasopharynx [[id number]]  (Normal) Collected: 09/1940    Lab Status: Final result Specimen: Swab from Nasopharynx Updated: 09/05/22 2011     COVID19 Not Detected     Influenza A PCR Not Detected     Influenza B PCR Not Detected    Narrative:      Fact sheet for providers: https://www.fda.gov/media/956863/download    Fact sheet for patients: https://www.fda.gov/media/054378/download    Test performed by PCR.        CT Chest Without Contrast Diagnostic    Result Date: 9/5/2022  EXAMINATION: CT SCAN OF THE CHEST WITHOUT INTRAVENOUS CONTRAST DATE OF EXAM: 9/5/2022 9:34 PM HISTORY: Bibasilar pneumonia COMPARISON: None. TECHNIQUE: CT examination of the chest was performed without intravenous contrast. CT dose lowering techniques were used, to include: automated exposure control, adjustment for patient size, and/or use of iterative reconstruction. 3D MIP reconstruction was performed under concurrent supervision not requiring an independent workstation, in order to increase the sensitivity for detection of pulmonary nodules. Note: The exam is limited because some types of pathology may not be adequately demonstrated due to lack of contrast enhancement. FINDINGS: CHEST: Lungs:  Bilateral lower lobe linear groundglass opacities extending to the pleural surfaces are most suggestive of atelectasis and/or scarring. No focal consolidation.. Pleura: Normal. Mediastinum And Elayne: No acute abnormality. A few calcified lymph nodes which are most compatible with sequela of old granulomatous disease. Cardiovascular: No acute abnormality on this nonenhanced exam.  Patchy moderate calcification of the coronary arteries and mitral valve and to a lesser degree the aortic annulus. Mild patchy calcified atherosclerosis of the thoracic aorta and proximal aspects of the branching great vessels. Chest Wall:  No evidence of acute abnormality. Upper  Abdomen: No acute abnormality. Cholelithiasis. MUSCULOSKELETAL: No evidence of acute abnormality.     Impression: 1.  Bibasal atelectasis and/or scarring. Superimposed infection is not entirely excluded but thought to be less likely. 2.  Cholelithiasis. Electronically signed by:  Leonid Acosta D.O.  9/5/2022 8:11 PM Mountain Time    XR Chest 1 View    Result Date: 9/5/2022  DATE OF EXAM: 9/5/2022 7:09 PM  PROCEDURE: XR CHEST 1 VW-  INDICATIONS: SOA triage protocol.  COMPARISON: None available.  TECHNIQUE: Single frontal view of the chest.  FINDINGS: Normal cardiomediastinal silhouette. There are streaky bibasilar parenchymal opacities, likely scarring or atelectasis. Otherwise the lungs are grossly clear. No significant pleural effusion. No evidence of pneumothorax. No acute osseous findings.      Impression: Mild streaky bibasilar opacities are favored to reflect scarring or atelectasis.  This report was finalized on 9/5/2022 7:49 PM by Migel Shaw MD.        Results for orders placed during the hospital encounter of 05/24/21    Adult Transthoracic Echo Complete W/ Cont if Necessary Per Protocol    Interpretation Summary  · Estimated left ventricular EF = 55% Left ventricular systolic function is normal.  · Left ventricular diastolic function is consistent with (grade II w/high LAP) pseudonormalization.  · Mild mitral valve regurgitation is present.  · Mild tricuspid valve regurgitation is present.  · Calculated right ventricular systolic pressure from tricuspid regurgitation is 39 mmHg.    I have reviewed the medications:  Scheduled Meds:amLODIPine, 2.5 mg, Oral, Daily  aspirin, 81 mg, Oral, Daily  cetirizine, 10 mg, Oral, Daily  clopidogrel, 75 mg, Oral, Daily  finasteride, 5 mg, Oral, Daily  insulin detemir, 50 Units, Subcutaneous, Q12H  insulin lispro, 0-24 Units, Subcutaneous, TID AC  ipratropium-albuterol, 3 mL, Nebulization, Q4H - RT  latanoprost, 1 drop, Both Eyes, Nightly  metoprolol succinate XL,  12.5 mg, Oral, Q24H  pioglitazone, 30 mg, Oral, Daily  piperacillin-tazobactam, 3.375 g, Intravenous, Q8H  rosuvastatin, 10 mg, Oral, Nightly  sodium chloride, 10 mL, Intravenous, Q12H  timolol, 1 drop, Both Eyes, BID      Continuous Infusions:lactated ringers, 100 mL/hr, Last Rate: 100 mL/hr (09/06/22 0824)      PRN Meds:.•  acetaminophen **OR** acetaminophen **OR** acetaminophen  •  albuterol  •  dextrose  •  dextrose  •  dextrose  •  dextrose  •  famotidine  •  glucagon (human recombinant)  •  glucagon (human recombinant)    Assessment & Plan   Assessment & Plan     Active Hospital Problems    Diagnosis  POA   • Self-catheterizes urinary bladder [Z78.9]  Unknown   • Acute UTI (urinary tract infection) [N39.0]  Unknown   • Sepsis (HCC) [A41.9]  Yes   • Type 2 diabetes mellitus with hyperglycemia (HCC) [E11.65]  Yes   • Pneumonia [J18.9]  Yes   • CAD (coronary artery disease) [I25.10]  Yes   • Hyperkalemia [E87.5]  Yes   • Acute renal failure superimposed on stage 3 chronic kidney disease (HCC) [N17.9, N18.30]  Yes   • Dyspnea and respiratory abnormalities [R06.00, R06.89]  Yes   • COPD [J44.9]  Yes   • Dyslipidemia [E78.5]  Yes   • HTN [I10]  Yes   • AGATHA on CPAP [G47.33, Z99.89]  Not Applicable      Resolved Hospital Problems   No resolved problems to display.     Brief Hospital Course to date:  Pro Pal is a 82 y.o. male with past medical history of COPD, type 2 diabetes, obstructive sleep apnea on CPAP, coronary artery disease, essential hypertension, BPH, dyslipidemia, complete heart block status post pacemaker, who presented to the hospital with increasing fatigue, worsening shortness of breath and productive cough.    Assessment and plan:  Severe sepsis 2/2 UTI, improving  Leukocytosis  Pneumonia ruled out  · CT scan chest essentially ruled out pneumonia and patient with minimal respiratory symptoms  · Urine analysis is positive for UTI and urine culture is pending.  Patient self cath at home which  might be the cause of his UTI  · MRSA swab negative, discontinue vancomycin  · Continue Zosyn  · Urine culture growing E. coli, sensitivity pending  · Continue IV fluids    HHS, resolved  Poorly controlled type 2 diabetes, A1c 11.8%  Medication noncompliance  · Did not take his insulin time 6 days  · Has been drinking plenty of Gatorade.  Blood sugar was markedly evaded on admission.  Started on Glucomander [facility] protocol  · Average insulin requirement while hospitalized is 5 to 6 units/h over the last 24 hours (around 100 to 120 units daily).  He is taking insulin Humulin R U5 100, 260 units at home (130 5 in the AM and 120 5 in the PM)  · Insulin drip discontinued 9/6/2022.  Started on insulin Levemir 50 units twice daily with sliding scale insulin and seems to be controlling his blood sugar   · Counseled about the importance to comply with his insulin     Dysphagia to solid  · Evaluated by speech team and felt that his dysphagia is esophageal in origin  · Spoke to Dr. Spurling/GI who graciously accepted to evaluate the patient for EGD during this hospitalization    Acute renal failure superimposed on CKD III    Hyperkalemia, resolved  · Likely secondary to dehydration Baseline creatinine is around 2.  On admission 3.28  ·  trending down with IV fluids.    · Monitor kidney functions     Coronary artery disease  · Status post PCI in May 2021  · Continue ASA, plavix, statin       Hx of complete heart block   · S/p PPM     AGATHA   · CPAP at HS      Expected Discharge Location and Transportation: Home versus acute rehab  Expected Discharge Date: 9/10/2022    DVT prophylaxis:  No DVT prophylaxis order currently exists.     AM-PAC 6 Clicks Score (PT): 18 (09/06/22 0800)    CODE STATUS:   Code Status and Medical Interventions:   Ordered at: 09/05/22 4302     Level Of Support Discussed With:    Patient     Code Status (Patient has no pulse and is not breathing):    CPR (Attempt to Resuscitate)     Medical Interventions  (Patient has pulse or is breathing):    Full Support       Liborio Allen MD  09/07/22

## 2022-09-08 ENCOUNTER — ANESTHESIA (OUTPATIENT)
Dept: GASTROENTEROLOGY | Facility: HOSPITAL | Age: 82
End: 2022-09-08

## 2022-09-08 ENCOUNTER — ANESTHESIA EVENT (OUTPATIENT)
Dept: GASTROENTEROLOGY | Facility: HOSPITAL | Age: 82
End: 2022-09-08

## 2022-09-08 LAB
ALBUMIN SERPL-MCNC: 3.3 G/DL (ref 3.5–5.2)
ALBUMIN/GLOB SERPL: 0.7 G/DL
ALP SERPL-CCNC: 154 U/L (ref 39–117)
ALT SERPL W P-5'-P-CCNC: 68 U/L (ref 1–41)
ANION GAP SERPL CALCULATED.3IONS-SCNC: 13 MMOL/L (ref 5–15)
AST SERPL-CCNC: 43 U/L (ref 1–40)
BACTERIA SPEC AEROBE CULT: ABNORMAL
BASOPHILS # BLD AUTO: 0.07 10*3/MM3 (ref 0–0.2)
BASOPHILS NFR BLD AUTO: 0.4 % (ref 0–1.5)
BILIRUB SERPL-MCNC: 0.3 MG/DL (ref 0–1.2)
BUN SERPL-MCNC: 67 MG/DL (ref 8–23)
BUN/CREAT SERPL: 25.4 (ref 7–25)
CALCIUM SPEC-SCNC: 9.3 MG/DL (ref 8.6–10.5)
CHLORIDE SERPL-SCNC: 102 MMOL/L (ref 98–107)
CO2 SERPL-SCNC: 20 MMOL/L (ref 22–29)
CREAT SERPL-MCNC: 2.64 MG/DL (ref 0.76–1.27)
DEPRECATED RDW RBC AUTO: 44.1 FL (ref 37–54)
EGFRCR SERPLBLD CKD-EPI 2021: 23.3 ML/MIN/1.73
EOSINOPHIL # BLD AUTO: 0.73 10*3/MM3 (ref 0–0.4)
EOSINOPHIL NFR BLD AUTO: 4.5 % (ref 0.3–6.2)
ERYTHROCYTE [DISTWIDTH] IN BLOOD BY AUTOMATED COUNT: 13.5 % (ref 12.3–15.4)
GLOBULIN UR ELPH-MCNC: 4.7 GM/DL
GLUCOSE BLDC GLUCOMTR-MCNC: 135 MG/DL (ref 70–130)
GLUCOSE BLDC GLUCOMTR-MCNC: 138 MG/DL (ref 70–130)
GLUCOSE BLDC GLUCOMTR-MCNC: 145 MG/DL (ref 70–130)
GLUCOSE BLDC GLUCOMTR-MCNC: 161 MG/DL (ref 70–130)
GLUCOSE BLDC GLUCOMTR-MCNC: 209 MG/DL (ref 70–130)
GLUCOSE BLDC GLUCOMTR-MCNC: 216 MG/DL (ref 70–130)
GLUCOSE SERPL-MCNC: 164 MG/DL (ref 65–99)
HCT VFR BLD AUTO: 32.7 % (ref 37.5–51)
HCT VFR BLD AUTO: 33.6 % (ref 37.5–51)
HCT VFR BLD AUTO: 36.1 % (ref 37.5–51)
HGB BLD-MCNC: 11.5 G/DL (ref 13–17.7)
HGB BLD-MCNC: 11.6 G/DL (ref 13–17.7)
HGB BLD-MCNC: 12.2 G/DL (ref 13–17.7)
IMM GRANULOCYTES # BLD AUTO: 0.45 10*3/MM3 (ref 0–0.05)
IMM GRANULOCYTES NFR BLD AUTO: 2.8 % (ref 0–0.5)
LYMPHOCYTES # BLD AUTO: 2.47 10*3/MM3 (ref 0.7–3.1)
LYMPHOCYTES NFR BLD AUTO: 15.4 % (ref 19.6–45.3)
MAGNESIUM SERPL-MCNC: 1.8 MG/DL (ref 1.6–2.4)
MCH RBC QN AUTO: 30.7 PG (ref 26.6–33)
MCHC RBC AUTO-ENTMCNC: 33.8 G/DL (ref 31.5–35.7)
MCV RBC AUTO: 90.9 FL (ref 79–97)
MONOCYTES # BLD AUTO: 1.23 10*3/MM3 (ref 0.1–0.9)
MONOCYTES NFR BLD AUTO: 7.7 % (ref 5–12)
NEUTROPHILS NFR BLD AUTO: 11.1 10*3/MM3 (ref 1.7–7)
NEUTROPHILS NFR BLD AUTO: 69.2 % (ref 42.7–76)
NRBC BLD AUTO-RTO: 0 /100 WBC (ref 0–0.2)
PHOSPHATE SERPL-MCNC: 3.9 MG/DL (ref 2.5–4.5)
PLATELET # BLD AUTO: 486 10*3/MM3 (ref 140–450)
PMV BLD AUTO: 10.1 FL (ref 6–12)
POTASSIUM SERPL-SCNC: 4.6 MMOL/L (ref 3.5–5.2)
PROT SERPL-MCNC: 8 G/DL (ref 6–8.5)
RBC # BLD AUTO: 3.97 10*6/MM3 (ref 4.14–5.8)
SODIUM SERPL-SCNC: 135 MMOL/L (ref 136–145)
WBC NRBC COR # BLD: 16.05 10*3/MM3 (ref 3.4–10.8)

## 2022-09-08 PROCEDURE — A9270 NON-COVERED ITEM OR SERVICE: HCPCS | Performed by: INTERNAL MEDICINE

## 2022-09-08 PROCEDURE — 63710000001 FINASTERIDE 5 MG TABLET: Performed by: INTERNAL MEDICINE

## 2022-09-08 PROCEDURE — 97530 THERAPEUTIC ACTIVITIES: CPT

## 2022-09-08 PROCEDURE — 63710000001 PIOGLITAZONE 15 MG TABLET: Performed by: INTERNAL MEDICINE

## 2022-09-08 PROCEDURE — 63710000001 METOPROLOL SUCCINATE XL 25 MG TABLET SUSTAINED-RELEASE 24 HOUR: Performed by: INTERNAL MEDICINE

## 2022-09-08 PROCEDURE — 63710000001 INSULIN LISPRO (HUMAN) PER 5 UNITS: Performed by: INTERNAL MEDICINE

## 2022-09-08 PROCEDURE — 63710000001 AMLODIPINE 2.5 MG TABLET: Performed by: INTERNAL MEDICINE

## 2022-09-08 PROCEDURE — 84100 ASSAY OF PHOSPHORUS: CPT | Performed by: INTERNAL MEDICINE

## 2022-09-08 PROCEDURE — 63710000001 CLOPIDOGREL 75 MG TABLET: Performed by: INTERNAL MEDICINE

## 2022-09-08 PROCEDURE — 83735 ASSAY OF MAGNESIUM: CPT | Performed by: INTERNAL MEDICINE

## 2022-09-08 PROCEDURE — 85018 HEMOGLOBIN: CPT | Performed by: INTERNAL MEDICINE

## 2022-09-08 PROCEDURE — 94799 UNLISTED PULMONARY SVC/PX: CPT

## 2022-09-08 PROCEDURE — 63710000001 LEVOFLOXACIN 500 MG TABLET: Performed by: INTERNAL MEDICINE

## 2022-09-08 PROCEDURE — 63710000001 INSULIN DETEMIR PER 5 UNITS: Performed by: INTERNAL MEDICINE

## 2022-09-08 PROCEDURE — 94761 N-INVAS EAR/PLS OXIMETRY MLT: CPT

## 2022-09-08 PROCEDURE — 25010000002 PIPERACILLIN SOD-TAZOBACTAM PER 1 G: Performed by: INTERNAL MEDICINE

## 2022-09-08 PROCEDURE — 99226 PR SBSQ OBSERVATION CARE/DAY 35 MINUTES: CPT | Performed by: INTERNAL MEDICINE

## 2022-09-08 PROCEDURE — 88305 TISSUE EXAM BY PATHOLOGIST: CPT | Performed by: INTERNAL MEDICINE

## 2022-09-08 PROCEDURE — 85014 HEMATOCRIT: CPT | Performed by: INTERNAL MEDICINE

## 2022-09-08 PROCEDURE — 63710000001 CETIRIZINE 10 MG TABLET: Performed by: INTERNAL MEDICINE

## 2022-09-08 PROCEDURE — 97116 GAIT TRAINING THERAPY: CPT

## 2022-09-08 PROCEDURE — G0378 HOSPITAL OBSERVATION PER HR: HCPCS

## 2022-09-08 PROCEDURE — 25010000002 PROPOFOL 10 MG/ML EMULSION: Performed by: NURSE ANESTHETIST, CERTIFIED REGISTERED

## 2022-09-08 PROCEDURE — 96375 TX/PRO/DX INJ NEW DRUG ADDON: CPT

## 2022-09-08 PROCEDURE — 82962 GLUCOSE BLOOD TEST: CPT

## 2022-09-08 PROCEDURE — 94664 DEMO&/EVAL PT USE INHALER: CPT

## 2022-09-08 PROCEDURE — 63710000001 ASPIRIN 81 MG TABLET DELAYED-RELEASE: Performed by: INTERNAL MEDICINE

## 2022-09-08 PROCEDURE — 0 LIDOCAINE 1 % SOLUTION: Performed by: NURSE ANESTHETIST, CERTIFIED REGISTERED

## 2022-09-08 PROCEDURE — 25010000002 PIPERACILLIN SOD-TAZOBACTAM PER 1 G: Performed by: NURSE PRACTITIONER

## 2022-09-08 PROCEDURE — 88313 SPECIAL STAINS GROUP 2: CPT | Performed by: INTERNAL MEDICINE

## 2022-09-08 PROCEDURE — 63710000001 ROSUVASTATIN 10 MG TABLET: Performed by: INTERNAL MEDICINE

## 2022-09-08 PROCEDURE — 85025 COMPLETE CBC W/AUTO DIFF WBC: CPT | Performed by: INTERNAL MEDICINE

## 2022-09-08 PROCEDURE — 43239 EGD BIOPSY SINGLE/MULTIPLE: CPT | Performed by: INTERNAL MEDICINE

## 2022-09-08 PROCEDURE — 80053 COMPREHEN METABOLIC PANEL: CPT | Performed by: INTERNAL MEDICINE

## 2022-09-08 RX ORDER — SODIUM CHLORIDE 9 MG/ML
INJECTION, SOLUTION INTRAVENOUS CONTINUOUS PRN
Status: DISCONTINUED | OUTPATIENT
Start: 2022-09-08 | End: 2022-09-08 | Stop reason: SURG

## 2022-09-08 RX ORDER — LIDOCAINE HYDROCHLORIDE 10 MG/ML
INJECTION, SOLUTION INFILTRATION; PERINEURAL AS NEEDED
Status: DISCONTINUED | OUTPATIENT
Start: 2022-09-08 | End: 2022-09-08 | Stop reason: SURG

## 2022-09-08 RX ORDER — PROPOFOL 10 MG/ML
VIAL (ML) INTRAVENOUS AS NEEDED
Status: DISCONTINUED | OUTPATIENT
Start: 2022-09-08 | End: 2022-09-08 | Stop reason: SURG

## 2022-09-08 RX ORDER — LEVOFLOXACIN 500 MG/1
250 TABLET, FILM COATED ORAL
Status: COMPLETED | OUTPATIENT
Start: 2022-09-08 | End: 2022-09-12

## 2022-09-08 RX ORDER — PROPOFOL 10 MG/ML
VIAL (ML) INTRAVENOUS CONTINUOUS PRN
Status: DISCONTINUED | OUTPATIENT
Start: 2022-09-08 | End: 2022-09-08 | Stop reason: SURG

## 2022-09-08 RX ADMIN — LATANOPROST 1 DROP: 50 SOLUTION OPHTHALMIC at 22:10

## 2022-09-08 RX ADMIN — Medication 10 ML: at 22:10

## 2022-09-08 RX ADMIN — PROPOFOL 25 MG: 10 INJECTION, EMULSION INTRAVENOUS at 08:41

## 2022-09-08 RX ADMIN — IPRATROPIUM BROMIDE AND ALBUTEROL SULFATE 3 ML: .5; 3 SOLUTION RESPIRATORY (INHALATION) at 16:01

## 2022-09-08 RX ADMIN — CETIRIZINE HYDROCHLORIDE 10 MG: 10 TABLET, FILM COATED ORAL at 09:59

## 2022-09-08 RX ADMIN — SODIUM CHLORIDE: 9 INJECTION, SOLUTION INTRAVENOUS at 08:44

## 2022-09-08 RX ADMIN — FINASTERIDE 5 MG: 5 TABLET, FILM COATED ORAL at 09:59

## 2022-09-08 RX ADMIN — METOPROLOL SUCCINATE 12.5 MG: 25 TABLET, EXTENDED RELEASE ORAL at 09:58

## 2022-09-08 RX ADMIN — Medication 125 MCG/KG/MIN: at 08:41

## 2022-09-08 RX ADMIN — TAZOBACTAM SODIUM AND PIPERACILLIN SODIUM 3.38 G: 375; 3 INJECTION, SOLUTION INTRAVENOUS at 09:58

## 2022-09-08 RX ADMIN — PIOGLITAZONE 30 MG: 15 TABLET ORAL at 09:58

## 2022-09-08 RX ADMIN — IPRATROPIUM BROMIDE AND ALBUTEROL SULFATE 3 ML: .5; 3 SOLUTION RESPIRATORY (INHALATION) at 03:25

## 2022-09-08 RX ADMIN — LIDOCAINE HYDROCHLORIDE 100 MG: 10 INJECTION, SOLUTION INFILTRATION; PERINEURAL at 08:40

## 2022-09-08 RX ADMIN — ASPIRIN 81 MG: 81 TABLET, COATED ORAL at 09:58

## 2022-09-08 RX ADMIN — Medication 10 ML: at 09:59

## 2022-09-08 RX ADMIN — INSULIN DETEMIR 50 UNITS: 100 INJECTION, SOLUTION SUBCUTANEOUS at 22:11

## 2022-09-08 RX ADMIN — ROSUVASTATIN CALCIUM 10 MG: 10 TABLET, COATED ORAL at 22:11

## 2022-09-08 RX ADMIN — INSULIN DETEMIR 50 UNITS: 100 INJECTION, SOLUTION SUBCUTANEOUS at 09:58

## 2022-09-08 RX ADMIN — IPRATROPIUM BROMIDE AND ALBUTEROL SULFATE 3 ML: .5; 3 SOLUTION RESPIRATORY (INHALATION) at 19:04

## 2022-09-08 RX ADMIN — INSULIN LISPRO 8 UNITS: 100 INJECTION, SOLUTION INTRAVENOUS; SUBCUTANEOUS at 18:21

## 2022-09-08 RX ADMIN — LEVOFLOXACIN 250 MG: 500 TABLET, FILM COATED ORAL at 12:28

## 2022-09-08 RX ADMIN — TIMOLOL MALEATE 1 DROP: 5 SOLUTION/ DROPS OPHTHALMIC at 09:57

## 2022-09-08 RX ADMIN — TIMOLOL MALEATE 1 DROP: 5 SOLUTION/ DROPS OPHTHALMIC at 22:11

## 2022-09-08 RX ADMIN — AMLODIPINE BESYLATE 2.5 MG: 2.5 TABLET ORAL at 09:58

## 2022-09-08 RX ADMIN — CLOPIDOGREL BISULFATE 75 MG: 75 TABLET ORAL at 09:58

## 2022-09-08 RX ADMIN — PANTOPRAZOLE SODIUM 40 MG: 40 INJECTION, POWDER, FOR SOLUTION INTRAVENOUS at 09:58

## 2022-09-08 RX ADMIN — IPRATROPIUM BROMIDE AND ALBUTEROL SULFATE 3 ML: .5; 3 SOLUTION RESPIRATORY (INHALATION) at 23:22

## 2022-09-08 NOTE — PLAN OF CARE
Goal Outcome Evaluation:  Plan of Care Reviewed With: patient        Progress: improving  Outcome Evaluation: Pt tolerated therapy well today. Was able to walk up/down entire hallway without LOB or rest breaks. Required CGA to complete independent hygiene after voiding due to decreased safety awareness. Pt reported mild dizziness after standing from commode but resolved quickly. BP was low after amb but pt was asymptomatic. Nursing notified. PT continue to rec home with OPPT services to return to [facility].

## 2022-09-08 NOTE — POST-PROCEDURE NOTE
EGD-  Small HH with LA Grade GERD at 35-36 cm.  GEJ at 40 cm.  Small HH and short segment Enriquez's.  No definite stenosis seen      REC   PPI  FU path  If continued sx needs EGD/Dil off blood thinners

## 2022-09-08 NOTE — PLAN OF CARE
Problem: Adult Inpatient Plan of Care  Goal: Plan of Care Review  Flowsheets (Taken 9/8/2022 1651)  Progress: improving  Plan of Care Reviewed With: patient  Outcome Evaluation: Pt had EGD this am. Remains alert, oriented. VSS. RA. Denied pain, discomfort, Continues to work with PT/OT. Ambulating in room with standby assist. POC discussed with pt. Verbalized understanding.  Goal: Absence of Hospital-Acquired Illness or Injury  Intervention: Identify and Manage Fall Risk  Recent Flowsheet Documentation  Taken 9/8/2022 1600 by Joshua Early RN  Safety Promotion/Fall Prevention:   activity supervised   assistive device/personal items within reach   clutter free environment maintained   lighting adjusted   nonskid shoes/slippers when out of bed   room organization consistent   safety round/check completed   toileting scheduled  Taken 9/8/2022 1400 by Joshua Early RN  Safety Promotion/Fall Prevention:   activity supervised   assistive device/personal items within reach   clutter free environment maintained   lighting adjusted   nonskid shoes/slippers when out of bed   room organization consistent   safety round/check completed   toileting scheduled  Taken 9/8/2022 1200 by Joshua Early RN  Safety Promotion/Fall Prevention:   assistive device/personal items within reach   activity supervised   clutter free environment maintained   gait belt   lighting adjusted   nonskid shoes/slippers when out of bed   room organization consistent   safety round/check completed   toileting scheduled  Taken 9/8/2022 1000 by Joshua Early RN  Safety Promotion/Fall Prevention:   activity supervised   assistive device/personal items within reach   clutter free environment maintained   gait belt   lighting adjusted   nonskid shoes/slippers when out of bed   safety round/check completed   room organization consistent   toileting scheduled  Taken 9/8/2022 0800 by Joshua Early RN  Safety Promotion/Fall Prevention:  patient off unit  Intervention: Prevent Skin Injury  Recent Flowsheet Documentation  Taken 9/8/2022 1600 by Joshua Early RN  Body Position:   position changed independently   right   side-lying  Taken 9/8/2022 1400 by Joshua Early RN  Body Position:   position changed independently   right   side-lying  Taken 9/8/2022 1200 by Joshua Early RN  Body Position: dangle, side of bed  Skin Protection:   adhesive use limited   incontinence pads utilized   tubing/devices free from skin contact  Taken 9/8/2022 1000 by Joshua Early RN  Body Position:   position changed independently   neutral body alignment  Skin Protection:   adhesive use limited   incontinence pads utilized   tubing/devices free from skin contact  Intervention: Prevent and Manage VTE (Venous Thromboembolism) Risk  Recent Flowsheet Documentation  Taken 9/8/2022 1600 by Joshua Early RN  Activity Management: activity adjusted per tolerance  Taken 9/8/2022 1400 by Joshua Early RN  Activity Management: activity adjusted per tolerance  Taken 9/8/2022 1200 by Joshua Early RN  Activity Management: activity adjusted per tolerance  Taken 9/8/2022 1000 by Joshua Early RN  Activity Management: activity adjusted per tolerance  Intervention: Prevent Infection  Recent Flowsheet Documentation  Taken 9/8/2022 1600 by Joshua Early RN  Infection Prevention:   environmental surveillance performed   rest/sleep promoted   single patient room provided  Taken 9/8/2022 1400 by Joshua Early RN  Infection Prevention:   environmental surveillance performed   rest/sleep promoted   single patient room provided  Taken 9/8/2022 1200 by Joshua Early RN  Infection Prevention:   environmental surveillance performed   rest/sleep promoted   single patient room provided  Taken 9/8/2022 1000 by Joshua Early RN  Infection Prevention:   environmental surveillance performed   rest/sleep promoted   single patient room  provided  Goal: Optimal Comfort and Wellbeing  Intervention: Provide Person-Centered Care  Recent Flowsheet Documentation  Taken 9/8/2022 1000 by Joshua Early RN  Trust Relationship/Rapport:   care explained   choices provided   questions answered   questions encouraged     Problem: Fall Injury Risk  Goal: Absence of Fall and Fall-Related Injury  Intervention: Identify and Manage Contributors  Recent Flowsheet Documentation  Taken 9/8/2022 1000 by Joshua Early RN  Medication Review/Management: medications reviewed  Intervention: Promote Injury-Free Environment  Recent Flowsheet Documentation  Taken 9/8/2022 1600 by Joshua Early RN  Safety Promotion/Fall Prevention:   activity supervised   assistive device/personal items within reach   clutter free environment maintained   lighting adjusted   nonskid shoes/slippers when out of bed   room organization consistent   safety round/check completed   toileting scheduled  Taken 9/8/2022 1400 by Joshua Early RN  Safety Promotion/Fall Prevention:   activity supervised   assistive device/personal items within reach   clutter free environment maintained   lighting adjusted   nonskid shoes/slippers when out of bed   room organization consistent   safety round/check completed   toileting scheduled  Taken 9/8/2022 1200 by Joshua Early RN  Safety Promotion/Fall Prevention:   assistive device/personal items within reach   activity supervised   clutter free environment maintained   gait belt   lighting adjusted   nonskid shoes/slippers when out of bed   room organization consistent   safety round/check completed   toileting scheduled  Taken 9/8/2022 1000 by Joshua Early RN  Safety Promotion/Fall Prevention:   activity supervised   assistive device/personal items within reach   clutter free environment maintained   gait belt   lighting adjusted   nonskid shoes/slippers when out of bed   safety round/check completed   room organization consistent    toileting scheduled  Taken 9/8/2022 0800 by Joshua Early, RN  Safety Promotion/Fall Prevention: patient off unit   Goal Outcome Evaluation:  Plan of Care Reviewed With: patient        Progress: improving  Outcome Evaluation: Pt had EGD this am. Remains alert, oriented. VSS. RA. Denied pain, discomfort, Continues to work with PT/OT. Ambulating in room with standby assist. POC discussed with pt. Verbalized understanding.

## 2022-09-08 NOTE — THERAPY TREATMENT NOTE
Patient Name: Pro Pal  : 1940    MRN: [id number]                              Today's Date: 2022       Admit Date: 2022    Visit Dx:     ICD-10-CM ICD-9-CM   1. Dyspnea and respiratory abnormalities  R06.00 786.09    R06.89    2. Bandemia  D72.825 288.66   3. Renal insufficiency  N28.9 593.9   4. Cough  R05.9 786.2   5. Chronic obstructive pulmonary disease, unspecified COPD type ([facility])  J44.9 496   6. Hyperglycemia  R73.9 790.29   7. Odynophagia  R13.10 787.20   8. Weakness  R53.1 780.79     Patient Active Problem List   Diagnosis   • STEMI   • COPD   • T2DM   • Dyslipidemia   • AGATHA on CPAP   • Enlarged prostate requiring self-catheterization   • Complete heart block   • HTN   • Gross hematuria   • Acute UTI (urinary tract infection)   • Sepsis ([facility])   • Type 2 diabetes mellitus with hyperglycemia ([facility])   • Pneumonia   • CAD (coronary artery disease)   • Hyperkalemia   • Acute renal failure superimposed on stage 3 chronic kidney disease ([facility])   • Dyspnea and respiratory abnormalities   • Self-catheterizes urinary bladder   • Acute UTI (urinary tract infection)   • Odynophagia     Past Medical History:   Diagnosis Date   • CAD (coronary artery disease) 2022   • COPD (chronic obstructive pulmonary disease) ([facility])    • Diabetes mellitus ([facility])    • Dyslipidemia 2021   • HTN 2021   • Sleep apnea    • STEMI 2021     Past Surgical History:   Procedure Laterality Date   • CARDIAC CATHETERIZATION N/A 2021    Procedure: Left Heart Cath;  Surgeon: Elisha Bettencourt MD;  Location: [facility] CATH INVASIVE LOCATION;  Service: Cardiology;  Laterality: N/A;      General Information     Row Name 22 145          Physical Therapy Time and Intention    Document Type therapy note (daily note)  -ES     Mode of Treatment physical therapy  -ES     Row Name 22 145          General Information    Patient Profile Reviewed yes  -ES     Existing Precautions/Restrictions fall  -ES      Row Name 09/08/22 1452          Cognition    Orientation Status (Cognition) oriented x 4  -ES     Row Name 09/08/22 1452          Safety Issues, Functional Mobility    Safety Issues Affecting Function (Mobility) awareness of need for assistance;insight into deficits/self-awareness;judgment;safety precaution awareness;safety precautions follow-through/compliance  -ES     Impairments Affecting Function (Mobility) endurance/activity tolerance;strength  -ES           User Key  (r) = Recorded By, (t) = Taken By, (c) = Cosigned By    Initials Name Provider Type    ES Polina Younger PT Physical Therapist               Mobility     Row Name 09/08/22 1453          Bed Mobility    Bed Mobility supine-sit  -ES     Supine-Sit [place] (Bed Mobility) minimum assist (75% patient effort)  -ES     Assistive Device (Bed Mobility) head of bed elevated  -ES     Row Name 09/08/22 1453          Transfers    Comment, (Transfers) Pt required VC for safety for sit>stand from commode.  -ES     Row Name 09/08/22 1453          Sit-Stand Transfer    Sit-Stand [place] (Transfers) contact guard;verbal cues  -ES     Row Name 09/08/22 1453          Gait/Stairs (Locomotion)    [place] Level (Gait) contact guard  -ES     Assistive Device (Gait) other (see comments)  UE assist via railing on wall  -ES     Distance in Feet (Gait) 300  -ES     Deviations/Abnormal Patterns (Gait) anand decreased;gait speed decreased  -ES     Comment, (Gait/Stairs) No LOB today. Utilized rails on wall for UE support.  -ES           User Key  (r) = Recorded By, (t) = Taken By, (c) = Cosigned By    Initials Name Provider Type    ES Polina Younger PT Physical Therapist               Obj/Interventions     Row Name 09/08/22 1455          Balance    Balance Assessment sitting static balance;sitting dynamic balance;sit to stand dynamic balance;standing static balance;standing dynamic balance  -ES     Static Sitting Balance independent  -ES     Dynamic  Sitting Balance independent  -ES     Position, Sitting Balance unsupported;sitting edge of bed  -ES     Sit to Stand Dynamic Balance contact guard  -ES     Static Standing Balance supervision  -ES     Dynamic Standing Balance contact guard  -ES     Position/Device Used, Standing Balance unsupported  -ES     Balance Interventions sitting;standing;sit to stand;dynamic;occupation based/functional task  -ES     Comment, Balance Pt required CGA for hygiene after voiding. Required VC for safety.  -ES           User Key  (r) = Recorded By, (t) = Taken By, (c) = Cosigned By    Initials Name Provider Type    ES Polina Younger, PT Physical Therapist               Goals/Plan    No documentation.                Clinical Impression     Row Name 09/08/22 1456          Pain    Pretreatment Pain Rating 0/10 - no pain  -ES     Posttreatment Pain Rating 0/10 - no pain  -ES     Pain Intervention(s) Repositioned;Ambulation/increased activity  -ES     Row Name 09/08/22 1456          Plan of Care Review    Plan of Care Reviewed With patient  -ES     Progress improving  -ES     Outcome Evaluation Pt tolerated therapy well today. Was able to walk up/down entire hallway without LOB or rest breaks. Required CGA to complete independent hygiene after voiding due to decreased safety awareness. Pt reported mild dizziness after standing from commode but resolved quickly. BP was low after amb but pt was asymptomatic. Nursing notified. PT continue to rec home with OPPT services to return to [facility].  -ES     Row Name 09/08/22 1456          Therapy Assessment/Plan (PT)    Rehab Potential (PT) good, to achieve stated therapy goals  -ES     Criteria for Skilled Interventions Met (PT) yes;meets criteria;skilled treatment is necessary  -ES     Therapy Frequency (PT) daily  -ES     Row Name 09/08/22 1456          Vital Signs    Pre Systolic BP Rehab 119  -ES     Pre Treatment Diastolic BP 56  -ES     Post Systolic BP Rehab 103  -ES     Post Treatment  Diastolic BP 77  -ES     O2 Delivery Pre Treatment room air  -ES     O2 Delivery Intra Treatment room air  -ES     O2 Delivery Post Treatment room air  -ES     Pre Patient Position Supine  -ES     Intra Patient Position Standing  -ES     Post Patient Position Sitting  -ES     Row Name 09/08/22 1456          Positioning and Restraints    Pre-Treatment Position in bed  -ES     Post Treatment Position chair  -ES     In Chair notified nsg;reclined;sitting;call light within reach;encouraged to call for assist;with family/caregiver;legs elevated;heels elevated  -ES           User Key  (r) = Recorded By, (t) = Taken By, (c) = Cosigned By    Initials Name Provider Type    Polina Maldonado PT Physical Therapist               Outcome Measures     Row Name 09/08/22 1459          How much help from another person do you currently need...    Turning from your back to your side while in flat bed without using bedrails? 4  -ES     Moving from lying on back to sitting on the side of a flat bed without bedrails? 3  -ES     Moving to and from a bed to a chair (including a wheelchair)? 3  -ES     Standing up from a chair using your arms (e.g., wheelchair, bedside chair)? 3  -ES     Climbing 3-5 steps with a railing? 3  -ES     To walk in hospital room? 3  -ES     AM-PAC 6 Clicks Score (PT) 19  -ES     Highest level of mobility 6 --> Walked 10 steps or more  -ES     Row Name 09/08/22 1459          Functional Assessment    Outcome Measure Options AM-PAC 6 Clicks Basic Mobility (PT)  -ES           User Key  (r) = Recorded By, (t) = Taken By, (c) = Cosigned By    Initials Name Provider Type    Polina Maldonado PT Physical Therapist                             Physical Therapy Education                 Title: PT OT SLP Therapies (Done)     Topic: Physical Therapy (Done)     Point: Mobility training (Done)     Learning Progress Summary           Patient Acceptance, E,D, VU by MB at 9/7/2022 1017                   Point: Body  mechanics (Done)     Learning Progress Summary           Patient Acceptance, E,D, VU by MB at 9/7/2022 1017                   Point: Precautions (Done)     Learning Progress Summary           Patient Acceptance, E,D, VU by MB at 9/7/2022 1017                               User Key     Initials Effective Dates Name Provider Type Discipline    MB 06/16/21 -  Evelia Garcia, PT Physical Therapist PT              PT Recommendation and Plan     Plan of Care Reviewed With: patient  Progress: improving  Outcome Evaluation: Pt tolerated therapy well today. Was able to walk up/down entire hallway without LOB or rest breaks. Required CGA to complete independent hygiene after voiding due to decreased safety awareness. Pt reported mild dizziness after standing from commode but resolved quickly. BP was low after amb but pt was asymptomatic. Nursing notified. PT continue to rec home with OPPT services to return to [facility].     Time Calculation:    PT Charges     Row Name 09/08/22 1459             Time Calculation    Start Time 1419  -ES              Time Calculation- PT    Total Timed Code Minutes- PT 30 minute(s)  -ES              Timed Charges    72934 - Gait Training Minutes  20  -ES      64067 - PT Therapeutic Activity Minutes 10  -ES              Total Minutes    Timed Charges Total Minutes 30  -ES       Total Minutes 30  -ES            User Key  (r) = Recorded By, (t) = Taken By, (c) = Cosigned By    Initials Name Provider Type    ES Polina Younger PT Physical Therapist              Therapy Charges for Today     Code Description Service Date Service Provider Modifiers Qty    72219897927 HC GAIT TRAINING EA 15 MIN 9/8/2022 Polina Younger, PT GP 1    [id number] HC PT THERAPEUTIC ACT EA 15 MIN 9/8/2022 Polina Younger PT GP 1          PT G-Codes  Outcome Measure Options: AM-PAC 6 Clicks Basic Mobility (PT)  AM-PAC 6 Clicks Score (PT): 19    Polina Younger PT  9/8/2022

## 2022-09-08 NOTE — ANESTHESIA PREPROCEDURE EVALUATION
Anesthesia Evaluation     Patient summary reviewed and Nursing notes reviewed   NPO Solid Status: > 8 hours  NPO Liquid Status: > 2 hours           Airway   Mallampati: II  TM distance: >3 FB  Neck ROM: full  No difficulty expected  Dental    (+) upper dentures, lower dentures and edentulous    Pulmonary    (+) pneumonia stable , COPD (no MDI at Home Prn CPAP) moderate, shortness of breath, sleep apnea,   Cardiovascular     ECG reviewed    (+) hypertension, past MI , CAD, cardiac stents dysrhythmias (CHB ),   (-) angina    ROS comment: ECG NSR ?IMI RBBB   ECHO EF = 55% LVDD (grade II w/high LAP) pseudonormalization.RVSP 39  Mild MR TI   ·  CATH 2021 PTCA/stent placement in the dominant Cx Temporary pacemaker placement CHB   ·No left ventriculogram or LV pressures were obtained.        Neuro/Psych  (-) seizures, CVA    ROS Comment: sats 96% RA   GI/Hepatic/Renal/Endo    (+)   renal disease (creat bump >3  now >2 ) CRI and ARF, diabetes mellitus (A1C >11) type 2 poorly controlled,   (-) liver disease, no thyroid disorder    Musculoskeletal     Abdominal    Substance History      OB/GYN          Other        ROS/Med Hx Other: Admitted w PNA and renal insufficiency     K 5.2   Repeat pending   HCT 35     EGD for incidental Dysphagia. No current nausea                 Anesthesia Plan    ASA 4     general and MAC     (PFL)  intravenous induction     Anesthetic plan, risks, benefits, and alternatives have been provided, discussed and informed consent has been obtained with: patient.    Plan discussed with CRNA.        CODE STATUS:    Level Of Support Discussed With: Patient  Code Status (Patient has no pulse and is not breathing): CPR (Attempt to Resuscitate)  Medical Interventions (Patient has pulse or is breathing): Full Support

## 2022-09-08 NOTE — ANESTHESIA POSTPROCEDURE EVALUATION
Patient: Pro Pal    Procedure Summary     Date: 09/08/22 Room / Location:  [facility]    Anesthesia Start: 0837 Anesthesia Stop: 0902    Procedure: ESOPHAGOGASTRODUODENOSCOPY (N/A ) Diagnosis:       Odynophagia      (Odynophagia [R13.10])    Surgeons: Elijah Gillis MD Provider: Frank Valadez MD    Anesthesia Type: general, MAC ASA Status: 4          Anesthesia Type: general, MAC    Vitals  Vitals Value Taken Time   BP     Temp     Pulse 73 09/08/22 0905   Resp     SpO2 92 % 09/08/22 0905   Vitals shown include unvalidated device data.        Post Anesthesia Care and Evaluation    Patient location during evaluation: PACU  Patient participation: complete - patient participated  Level of consciousness: awake and alert  Pain management: adequate    Airway patency: patent  Anesthetic complications: No anesthetic complications  PONV Status: none  Cardiovascular status: hemodynamically stable and acceptable  Respiratory status: nonlabored ventilation, acceptable and nasal cannula  Hydration status: acceptable

## 2022-09-09 ENCOUNTER — APPOINTMENT (OUTPATIENT)
Dept: ULTRASOUND IMAGING | Facility: HOSPITAL | Age: 82
End: 2022-09-09

## 2022-09-09 LAB
ANION GAP SERPL CALCULATED.3IONS-SCNC: 15 MMOL/L (ref 5–15)
BASOPHILS # BLD AUTO: 0.09 10*3/MM3 (ref 0–0.2)
BASOPHILS NFR BLD AUTO: 0.6 % (ref 0–1.5)
BUN SERPL-MCNC: 71 MG/DL (ref 8–23)
BUN/CREAT SERPL: 25.1 (ref 7–25)
CALCIUM SPEC-SCNC: 9.2 MG/DL (ref 8.6–10.5)
CHLORIDE SERPL-SCNC: 105 MMOL/L (ref 98–107)
CO2 SERPL-SCNC: 16 MMOL/L (ref 22–29)
CREAT SERPL-MCNC: 2.83 MG/DL (ref 0.76–1.27)
CYTO UR: NORMAL
DEPRECATED RDW RBC AUTO: 44.4 FL (ref 37–54)
EGFRCR SERPLBLD CKD-EPI 2021: 21.4 ML/MIN/1.73
EOSINOPHIL # BLD AUTO: 0.83 10*3/MM3 (ref 0–0.4)
EOSINOPHIL NFR BLD AUTO: 5.8 % (ref 0.3–6.2)
ERYTHROCYTE [DISTWIDTH] IN BLOOD BY AUTOMATED COUNT: 13.6 % (ref 12.3–15.4)
GLUCOSE BLDC GLUCOMTR-MCNC: 129 MG/DL (ref 70–130)
GLUCOSE BLDC GLUCOMTR-MCNC: 189 MG/DL (ref 70–130)
GLUCOSE BLDC GLUCOMTR-MCNC: 223 MG/DL (ref 70–130)
GLUCOSE BLDC GLUCOMTR-MCNC: 227 MG/DL (ref 70–130)
GLUCOSE SERPL-MCNC: 150 MG/DL (ref 65–99)
HCT VFR BLD AUTO: 31.6 % (ref 37.5–51)
HCT VFR BLD AUTO: 34.4 % (ref 37.5–51)
HCT VFR BLD AUTO: 36.5 % (ref 37.5–51)
HCT VFR BLD AUTO: 37.2 % (ref 37.5–51)
HCT VFR BLD AUTO: 37.7 % (ref 37.5–51)
HGB BLD-MCNC: 11.2 G/DL (ref 13–17.7)
HGB BLD-MCNC: 11.7 G/DL (ref 13–17.7)
HGB BLD-MCNC: 11.9 G/DL (ref 13–17.7)
HGB BLD-MCNC: 12.1 G/DL (ref 13–17.7)
HGB BLD-MCNC: 12.3 G/DL (ref 13–17.7)
IMM GRANULOCYTES # BLD AUTO: 0.39 10*3/MM3 (ref 0–0.05)
IMM GRANULOCYTES NFR BLD AUTO: 2.7 % (ref 0–0.5)
LAB AP CASE REPORT: NORMAL
LAB AP CLINICAL INFORMATION: NORMAL
LYMPHOCYTES # BLD AUTO: 2.65 10*3/MM3 (ref 0.7–3.1)
LYMPHOCYTES NFR BLD AUTO: 18.7 % (ref 19.6–45.3)
MCH RBC QN AUTO: 29.1 PG (ref 26.6–33)
MCHC RBC AUTO-ENTMCNC: 32.5 G/DL (ref 31.5–35.7)
MCV RBC AUTO: 89.4 FL (ref 79–97)
MONOCYTES # BLD AUTO: 0.96 10*3/MM3 (ref 0.1–0.9)
MONOCYTES NFR BLD AUTO: 6.8 % (ref 5–12)
NEUTROPHILS NFR BLD AUTO: 65.4 % (ref 42.7–76)
NEUTROPHILS NFR BLD AUTO: 9.27 10*3/MM3 (ref 1.7–7)
NRBC BLD AUTO-RTO: 0 /100 WBC (ref 0–0.2)
PATH REPORT.FINAL DX SPEC: NORMAL
PATH REPORT.GROSS SPEC: NORMAL
PLATELET # BLD AUTO: 448 10*3/MM3 (ref 140–450)
PMV BLD AUTO: 10.7 FL (ref 6–12)
POTASSIUM SERPL-SCNC: 4.6 MMOL/L (ref 3.5–5.2)
RBC # BLD AUTO: 4.16 10*6/MM3 (ref 4.14–5.8)
SODIUM SERPL-SCNC: 136 MMOL/L (ref 136–145)
SODIUM UR-SCNC: 72 MMOL/L
WBC NRBC COR # BLD: 14.19 10*3/MM3 (ref 3.4–10.8)

## 2022-09-09 PROCEDURE — 76775 US EXAM ABDO BACK WALL LIM: CPT

## 2022-09-09 PROCEDURE — A9270 NON-COVERED ITEM OR SERVICE: HCPCS | Performed by: INTERNAL MEDICINE

## 2022-09-09 PROCEDURE — G0108 DIAB MANAGE TRN  PER INDIV: HCPCS

## 2022-09-09 PROCEDURE — 63710000001 AMLODIPINE 2.5 MG TABLET: Performed by: INTERNAL MEDICINE

## 2022-09-09 PROCEDURE — 82570 ASSAY OF URINE CREATININE: CPT | Performed by: INTERNAL MEDICINE

## 2022-09-09 PROCEDURE — 85018 HEMOGLOBIN: CPT | Performed by: INTERNAL MEDICINE

## 2022-09-09 PROCEDURE — 63710000001 METOPROLOL SUCCINATE XL 25 MG TABLET SUSTAINED-RELEASE 24 HOUR: Performed by: INTERNAL MEDICINE

## 2022-09-09 PROCEDURE — 94761 N-INVAS EAR/PLS OXIMETRY MLT: CPT

## 2022-09-09 PROCEDURE — 63710000001 ROSUVASTATIN 10 MG TABLET: Performed by: INTERNAL MEDICINE

## 2022-09-09 PROCEDURE — 63710000001 INSULIN DETEMIR PER 5 UNITS: Performed by: INTERNAL MEDICINE

## 2022-09-09 PROCEDURE — 63710000001 PIOGLITAZONE 15 MG TABLET: Performed by: INTERNAL MEDICINE

## 2022-09-09 PROCEDURE — 94799 UNLISTED PULMONARY SVC/PX: CPT

## 2022-09-09 PROCEDURE — 99226 PR SBSQ OBSERVATION CARE/DAY 35 MINUTES: CPT | Performed by: INTERNAL MEDICINE

## 2022-09-09 PROCEDURE — G0378 HOSPITAL OBSERVATION PER HR: HCPCS

## 2022-09-09 PROCEDURE — 85014 HEMATOCRIT: CPT | Performed by: INTERNAL MEDICINE

## 2022-09-09 PROCEDURE — 80048 BASIC METABOLIC PNL TOTAL CA: CPT | Performed by: INTERNAL MEDICINE

## 2022-09-09 PROCEDURE — 84300 ASSAY OF URINE SODIUM: CPT | Performed by: INTERNAL MEDICINE

## 2022-09-09 PROCEDURE — 96367 TX/PROPH/DG ADDL SEQ IV INF: CPT

## 2022-09-09 PROCEDURE — 63710000001 LEVOFLOXACIN 500 MG TABLET: Performed by: INTERNAL MEDICINE

## 2022-09-09 PROCEDURE — 82962 GLUCOSE BLOOD TEST: CPT

## 2022-09-09 PROCEDURE — 63710000001 ASPIRIN 81 MG TABLET DELAYED-RELEASE: Performed by: INTERNAL MEDICINE

## 2022-09-09 PROCEDURE — 96366 THER/PROPH/DIAG IV INF ADDON: CPT

## 2022-09-09 PROCEDURE — 63710000001 CLOPIDOGREL 75 MG TABLET: Performed by: INTERNAL MEDICINE

## 2022-09-09 PROCEDURE — 94664 DEMO&/EVAL PT USE INHALER: CPT

## 2022-09-09 PROCEDURE — 63710000001 CETIRIZINE 10 MG TABLET: Performed by: INTERNAL MEDICINE

## 2022-09-09 PROCEDURE — 85025 COMPLETE CBC W/AUTO DIFF WBC: CPT | Performed by: INTERNAL MEDICINE

## 2022-09-09 PROCEDURE — 63710000001 FINASTERIDE 5 MG TABLET: Performed by: INTERNAL MEDICINE

## 2022-09-09 PROCEDURE — 63710000001 INSULIN LISPRO (HUMAN) PER 5 UNITS: Performed by: INTERNAL MEDICINE

## 2022-09-09 PROCEDURE — 82043 UR ALBUMIN QUANTITATIVE: CPT | Performed by: INTERNAL MEDICINE

## 2022-09-09 RX ADMIN — INSULIN LISPRO 4 UNITS: 100 INJECTION, SOLUTION INTRAVENOUS; SUBCUTANEOUS at 12:59

## 2022-09-09 RX ADMIN — INSULIN DETEMIR 50 UNITS: 100 INJECTION, SOLUTION SUBCUTANEOUS at 09:35

## 2022-09-09 RX ADMIN — AMLODIPINE BESYLATE 2.5 MG: 2.5 TABLET ORAL at 09:34

## 2022-09-09 RX ADMIN — LEVOFLOXACIN 250 MG: 500 TABLET, FILM COATED ORAL at 09:34

## 2022-09-09 RX ADMIN — ROSUVASTATIN CALCIUM 10 MG: 10 TABLET, COATED ORAL at 22:27

## 2022-09-09 RX ADMIN — Medication 10 ML: at 22:27

## 2022-09-09 RX ADMIN — IPRATROPIUM BROMIDE AND ALBUTEROL SULFATE 3 ML: .5; 3 SOLUTION RESPIRATORY (INHALATION) at 08:23

## 2022-09-09 RX ADMIN — SODIUM BICARBONATE 150 MEQ: 84 INJECTION INTRAVENOUS at 17:13

## 2022-09-09 RX ADMIN — LATANOPROST 1 DROP: 50 SOLUTION OPHTHALMIC at 22:28

## 2022-09-09 RX ADMIN — INSULIN DETEMIR 50 UNITS: 100 INJECTION, SOLUTION SUBCUTANEOUS at 22:29

## 2022-09-09 RX ADMIN — FINASTERIDE 5 MG: 5 TABLET, FILM COATED ORAL at 09:34

## 2022-09-09 RX ADMIN — INSULIN LISPRO 8 UNITS: 100 INJECTION, SOLUTION INTRAVENOUS; SUBCUTANEOUS at 17:13

## 2022-09-09 RX ADMIN — CETIRIZINE HYDROCHLORIDE 10 MG: 10 TABLET, FILM COATED ORAL at 09:34

## 2022-09-09 RX ADMIN — TIMOLOL MALEATE 1 DROP: 5 SOLUTION/ DROPS OPHTHALMIC at 09:35

## 2022-09-09 RX ADMIN — PANTOPRAZOLE SODIUM 40 MG: 40 INJECTION, POWDER, FOR SOLUTION INTRAVENOUS at 06:41

## 2022-09-09 RX ADMIN — ASPIRIN 81 MG: 81 TABLET, COATED ORAL at 09:34

## 2022-09-09 RX ADMIN — TIMOLOL MALEATE 1 DROP: 5 SOLUTION/ DROPS OPHTHALMIC at 22:21

## 2022-09-09 RX ADMIN — METOPROLOL SUCCINATE 12.5 MG: 25 TABLET, EXTENDED RELEASE ORAL at 09:34

## 2022-09-09 RX ADMIN — IPRATROPIUM BROMIDE AND ALBUTEROL SULFATE 3 ML: .5; 3 SOLUTION RESPIRATORY (INHALATION) at 23:21

## 2022-09-09 RX ADMIN — IPRATROPIUM BROMIDE AND ALBUTEROL SULFATE 3 ML: .5; 3 SOLUTION RESPIRATORY (INHALATION) at 18:33

## 2022-09-09 RX ADMIN — PIOGLITAZONE 30 MG: 15 TABLET ORAL at 09:34

## 2022-09-09 RX ADMIN — Medication 10 ML: at 09:34

## 2022-09-09 RX ADMIN — CLOPIDOGREL BISULFATE 75 MG: 75 TABLET ORAL at 09:34

## 2022-09-09 NOTE — PROGRESS NOTES
Spoke with son Ron. He is agreeable to [facility]. Verified PCP- Dr Silva spoke with Erm[facility]. He will sign home health orders. His correct number is [phone] [facility]. Tawana CONDE, [facility] [place]-Liaison

## 2022-09-09 NOTE — CONSULTS
Diabetes Education    Patient Name:  Pro Pal  YOB: 1940  MRN: [id number]  Admit Date:  9/5/2022    Consult for diabetes education received; Chart reviewed. Pt was seen at bedside today. Permission given for visit. He is accompanied by his son Ron.    Discussed and taught pt bout type 2 diabetes self-management, risk factors, and importance of blood glucose control to reduce complications. Mr. Pal shares that he takes U-500 insulin at home, 135u qAM and 125u qPM. He states that he has followed this regimen for less than a year, and is followed by an Endo provider, Ifeanyi, at [facility]. He reports that his A1c has ranged from 9-14, and he noticed his mgmt worsened after the passing of his wife, who was a nurse and helped keep him on track. He lives alone, but his son Ron live about 2 miles away. Ron brings him a box of vegetables every Saturday morning to cook throughout the week; pt finds it difficult to cook for one person, however. Reviewed the Plate Method with pt, he indicates he is familiar with this meal plan.     Target blood glucose readings and A1c goals per ADA were reviewed. Reviewed with patient current A1c 11.8 and discussed its significance. Signs, symptoms, and treatment of hyperglycemia and hypoglycemia were discussed. Pt states that when his sugar is low, he drinks Gatorade or eats a peanut butter a jelly sandwich. Reviewed Rule of 15's and importance of fast-acting carbohydrate followed by balanced snack to help stabilize blood sugars. After reviewing s/s hyperglycemia, pt reports feeling many of those symptoms including headache, increased thirst, dry skin.     We talked about factors that raise blood sugar, including illness and stress and how, when feeling ill, it is important to follow sick day plan. He states he does not have a sick day plan at this time. Reviewed general guidelines with him and his son, including continuing to take medications (unless  "otherwise directed by provider), drinking 8 oz calorie free beverage for every one hour awake, notifying physician if blood sugar >100 above normal or <60 below normal, monitoring blood sugar every 2-4 hours, and seeking medical attention. Described to pt what happens within body when body does not have enough insulin to use glucose for fuel, and how dangerous this can be. Pts son, Ron, taking notes, and pt verbalizes plan to discuss with his provider.     He currently checks his blood sugar twice per day. He is able to describe proper technique for insulin injection.     Pt states prior to coming to the hospital, he had been feeling poorly for several days, very nauseous, unable to keep fluids down, and was drinking \"light\" Gatorade to help his electrolytes. Pt encouraged to read nutrition labels and observe carbohydrate/sugar contact of beverages. Discussed calorie free, caffeine free beverage options. Described to pt that illness/infection can raise blood sugar, reinforced the importance of closer monitoring during periods of illness.    Encouraged pt to monitor blood sugar at home 2 times per day, more if sick, and to call PCP if blood sugar is trending high. Encouraged to keep record of blood glucose readings to take to follow up appointment with PCP. Provided patient with copy of [facility]'s \"What is Diabetes\" handout, \"Blood Glucose Goals\" handout, and \"What is A1c\" handout.    Pt also provided OP office contact info and encouraged to call us with any educational needs or concerns.     Thank you for this consult.     Electronically signed by:  Temitope Cuellar RN  09/09/22 17:22 EDT  "

## 2022-09-09 NOTE — CASE MANAGEMENT/SOCIAL WORK
Continued Stay Note  [facility]     Patient Name: Pro Pal  MRN: [id number]  Today's Date: 9/9/2022    Admit Date: 9/5/2022     Discharge Plan     Row Name 09/09/22 1034       Plan    Plan home with home health    Patient/Family in Agreement with Plan yes    Plan Comments I met with Mr. Pal at the bedside to discuss his discharge plan. He anticipates returning home after this hospitalization. He verbalizes that he is not ready for hospital discharge as he cannot take care of himself at home. I offered to provide resources for in home caregivers to assist with household chores. He declined these resources at this time. I offered to assist with senior living options as well as longterm care facility placement. He also refused this stating that he's not interested in moving out of his home. His son Ron lives in [facility] as well. I spoke with him on the phone and notified him of his father's discharge plan. Ron stated that he can transport his father home by car at the time of hospital discharge. He also stated that he could assist with transportation when he needs to leave his home. I provided Ron with contact information for [facility], an agency that assists with in home services as well as senior living options in case Mr. Pal changes his mind once he is discharged. Mr. Pal is agreeable to home health services. I called a referral to Tawana with [facility] home care. They will follow Mr. Pal with skilled nursing, PT, OT and . Mr. Pal has insurance coverage through [employer] medicare. I spoke with Martha with [facility] who is going to facilitate getting their home meal program arranged to assist with meals at home. Mr. Pal denies having any additional discharge needs at this time.    Final Discharge Disposition Code 06 - home with home health care               Discharge Codes    No documentation.                     Brian García RN

## 2022-09-09 NOTE — PROGRESS NOTES
Afternoon after he returned from D.    [facility] Medicine Services  PROGRESS NOTE    Patient Name: Pro Pal  : 1940  MRN: [id number]    Date of Admission: 2022  Primary Care Physician: Leonard Silva MBBS    Subjective   Subjective     CC:  Follow-up for shortness of breath and HHS    HPI:  I have seen and evaluated the patient this morning.  Feeling slightly better.  No shortness of breath.  No urinary symptoms.  Still hesitant about going home and not feeling that he can cope with his ADLs at home alone but still declining rehab or long-term, or a settling facility placement    ROS:  General : no fevers, chills  CVS: No chest pain, palpitations  Respiratory: No cough, dyspnea  GI: No N/V/D, abd pain  10 point review of systems is negative except for what is mentioned in HPI     Objective   Objective     Vital Signs:   Temp:  [96.9 °F (36.1 °C)-98.5 °F (36.9 °C)] 98.5 °F (36.9 °C)  Heart Rate:  [66-84] 73  Resp:  [16-20] 16  BP: ()/(31-85) 137/58  Flow (L/min):  [3-10] 3     Physical Exam:  General: Chronically ill looking.  Conversant and pleasant.  Head: Atraumatic and normocephalic, without obvious abnormality  Eyes:   Conjunctivae and sclerae normal, no Icterus. No pallor  Ears:  Ears appear intact with no abnormalities noted  Throat: No oral lesions, no thrush, oral mucosa moist  Neck: Supple, trachea midline, no thyromegaly  Back:   No kyphoscoliosis present. No tenderness to palpation,   no sacral edema  Lungs: Clear to auscultation bilaterally, equal air entry, no wheezing or crackles  Heart:  Normal S1 and S2, no murmur, no gallop, No JVD, no lower extremity swelling  Abdomen:  Soft, suprapubic tenderness, no organomegaly, normal bowel sounds  Normal bowel sounds, no masses, no organomegaly. Soft, nontender, nondistended, no guarding, no rebound tenderness.  Extremities: No gross abnormalities, no clubbing, pulses palpable and equal  bilaterally  Skin: No bleeding, bruising or rash, normal skin turgor and elasticity  Neurologic: Cranial nerves appear intact with no evidence of facial asymmetry, normal motor and sensory functions in all 4 extremities  Psych: Alert and oriented x 3, normal mood    Results Reviewed:  LAB RESULTS:      Lab 09/08/22  1857 09/08/22  1421 09/08/22  0729 09/07/22  2250 09/07/22  1816 09/07/22  1427 09/07/22  0508 09/06/22  0341 09/06/22  0004 09/05/22  1911   WBC  --   --  16.05*  --   --   --  16.84*  --  17.27* 22.80*   HEMOGLOBIN 11.6* 11.5* 12.2* 12.5* 11.9*   < > 12.0*   < > 12.0* 12.3*   HEMATOCRIT 32.7* 33.6* 36.1* 35.8* 34.9*   < > 32.7*   < > 32.7* 35.7*   PLATELETS  --   --  486*  --   --   --  396  --  276 374   NEUTROS ABS  --   --  11.10*  --   --   --  12.59*  --  14.59* 19.05*   IMMATURE GRANS (ABS)  --   --  0.45*  --   --   --  0.36*  --  0.20* 0.29*   LYMPHS ABS  --   --  2.47  --   --   --  2.08  --  1.53 1.65   MONOS ABS  --   --  1.23*  --   --   --  1.40*  --  0.83 1.72*   EOS ABS  --   --  0.73*  --   --   --  0.37  --  0.08 0.04   MCV  --   --  90.9  --   --   --  92.4  --  93.7 89.3   PROCALCITONIN  --   --   --   --   --   --   --   --   --  1.32*   LACTATE  --   --   --   --   --   --   --   --   --  1.9    < > = values in this interval not displayed.         Lab 09/08/22  0729 09/07/22  1427 09/06/22  1039 09/06/22  0341 09/06/22  0004 09/05/22  2227 09/05/22  1911   SODIUM 135* 137 135* 132* 127*  --  122*   POTASSIUM 4.6 5.2 4.8 4.5 4.8  --  5.3*   CHLORIDE 102 103 100 97* 92*  --  84*   CO2 20.0* 17.0* 22.0 20.0* 18.0*  --  19.0*   ANION GAP 13.0 17.0* 13.0 15.0 17.0*  --  19.0*   BUN 67* 71* 77* 79* 83*  --  83*   CREATININE 2.64* 2.91* 2.98* 3.04* 3.05*  --  3.28*   EGFR 23.3* 20.7* 20.1* 19.7* 19.6*  --  18.0*   GLUCOSE 164* 158* 169* 257* 470*   < > 664*   CALCIUM 9.3 8.5* 8.9 9.2 8.9  --  9.8   MAGNESIUM 1.8 1.9 2.0 2.1 2.7*   < >  --    PHOSPHORUS 3.9 3.5 2.5 3.1 3.5   < >  --     HEMOGLOBIN A1C  --   --   --   --   --   --  11.80*    < > = values in this interval not displayed.         Lab 09/08/22  0729 09/07/22  1427 09/05/22 1911   TOTAL PROTEIN 8.0 6.3 8.7*   ALBUMIN 3.30* 2.90* 3.50   GLOBULIN 4.7 3.4 5.2   ALT (SGPT) 68* 75* 29   AST (SGOT) 43* 75* 35   BILIRUBIN 0.3 0.2 0.4   ALK PHOS 154* 140* 130*         Lab 09/05/22  1911   PROBNP 1,048.0   TROPONIN T <0.010             Lab 09/06/22  0341 09/06/22  0004   ABO TYPING A A   RH TYPING Positive Positive   ANTIBODY SCREEN  --  Negative         Lab 09/05/22 2101   PH, ARTERIAL 7.384   PCO2, ARTERIAL 34.3*   PO2 ART 76.4*   FIO2 21   HCO3 ART 20.4   BASE EXCESS ART -4.0*   CARBOXYHEMOGLOBIN 1.0     Brief Urine Lab Results  (Last result in the past 365 days)      Color   Clarity   Blood   Leuk Est   Nitrite   Protein   CREAT   Urine HCG        09/05/22 2221 Yellow   Cloudy   Moderate (2+)   Small (1+)   Negative   100 mg/dL (2+)                 Microbiology Results Abnormal     Procedure Component Value - Date/Time    Blood Culture - Blood, Arm, Left [[id number]]  (Normal) Collected: 09/05/22 2020    Lab Status: Preliminary result Specimen: Blood from Arm, Left Updated: 09/08/22 2102     Blood Culture No growth at 3 days    Blood Culture - Blood, Wrist, Right [[id number]]  (Normal) Collected: 09/05/22 2015    Lab Status: Preliminary result Specimen: Blood from Wrist, Right Updated: 09/08/22 2102     Blood Culture No growth at 3 days    S. Pneumo Ag Urine or CSF - Urine, Urine, Clean Catch [[id number]]  (Normal) Collected: 09/06/22 0159    Lab Status: Final result Specimen: Urine, Clean Catch Updated: 09/06/22 0929     Strep Pneumo Ag Negative    Legionella Antigen, Urine - Urine, Urine, Clean Catch [[id number]]  (Normal) Collected: 09/06/22 0159    Lab Status: Final result Specimen: Urine, Clean Catch Updated: 09/06/22 0929     LEGIONELLA ANTIGEN, URINE Negative    MRSA Screen, PCR (Inpatient) - Swab, Nares [[id number]]  (Normal)  Collected: 09/06/22 0619    Lab Status: Final result Specimen: Swab from Nares Updated: 09/06/22 0813     MRSA PCR Negative    Narrative:      The negative predictive value of this diagnostic test is high and should only be used to consider de-escalating anti-MRSA therapy. A positive result may indicate colonization with MRSA and must be correlated clinically.  MRSA Negative    Respiratory Panel PCR w/COVID-19(SARS-CoV-2) TREASURE/MAIKOL/ELTON/PAD/COR/MAD/KISHORE In-House, NP Swab in UTM/VTM, 3-4 HR TAT - Swab, Nasopharynx [[id number]]  (Normal) Collected: 09/06/22 0619    Lab Status: Final result Specimen: Swab from Nasopharynx Updated: 09/06/22 0750     ADENOVIRUS, PCR Not Detected     Coronavirus 229E Not Detected     Coronavirus HKU1 Not Detected     Coronavirus NL63 Not Detected     Coronavirus OC43 Not Detected     COVID19 Not Detected     Human Metapneumovirus Not Detected     Human Rhinovirus/Enterovirus Not Detected     Influenza A PCR Not Detected     Influenza B PCR Not Detected     Parainfluenza Virus 1 Not Detected     Parainfluenza Virus 2 Not Detected     Parainfluenza Virus 3 Not Detected     Parainfluenza Virus 4 Not Detected     RSV, PCR Not Detected     Bordetella pertussis pcr Not Detected     Bordetella parapertussis PCR Not Detected     Chlamydophila pneumoniae PCR Not Detected     Mycoplasma pneumo by PCR Not Detected    Narrative:      In the setting of a positive respiratory panel with a viral infection PLUS a negative procalcitonin without other underlying concern for bacterial infection, consider observing off antibiotics or discontinuation of antibiotics and continue supportive care. If the respiratory panel is positive for atypical bacterial infection (Bordetella pertussis, Chlamydophila pneumoniae, or Mycoplasma pneumoniae), consider antibiotic de-escalation to target atypical bacterial infection.    COVID PRE-OP / PRE-PROCEDURE SCREENING ORDER (NO ISOLATION) - Swab, Nasopharynx [[id number]]  (Normal)  Collected: 09/1940    Lab Status: Final result Specimen: Swab from Nasopharynx Updated: 09/05/22 2011    Narrative:      The following orders were created for panel order COVID PRE-OP / PRE-PROCEDURE SCREENING ORDER (NO ISOLATION) - Swab, Nasopharynx.  Procedure                               Abnormality         Status                     ---------                               -----------         ------                     COVID-19 and FLU A/B PCR...[[id number]]  Normal              Final result                 Please view results for these tests on the individual orders.    COVID-19 and FLU A/B PCR - Swab, Nasopharynx [[id number]]  (Normal) Collected: 09/1940    Lab Status: Final result Specimen: Swab from Nasopharynx Updated: 09/05/22 2011     COVID19 Not Detected     Influenza A PCR Not Detected     Influenza B PCR Not Detected    Narrative:      Fact sheet for providers: https://www.fda.gov/media/048748/download    Fact sheet for patients: https://www.fda.gov/media/099897/download    Test performed by PCR.        No radiology results from the last 24 hrs    Results for orders placed during the hospital encounter of 05/24/21    Adult Transthoracic Echo Complete W/ Cont if Necessary Per Protocol    Interpretation Summary  · Estimated left ventricular EF = 55% Left ventricular systolic function is normal.  · Left ventricular diastolic function is consistent with (grade II w/high LAP) pseudonormalization.  · Mild mitral valve regurgitation is present.  · Mild tricuspid valve regurgitation is present.  · Calculated right ventricular systolic pressure from tricuspid regurgitation is 39 mmHg.    I have reviewed the medications:  Scheduled Meds:amLODIPine, 2.5 mg, Oral, Daily  aspirin, 81 mg, Oral, Daily  cetirizine, 10 mg, Oral, Daily  clopidogrel, 75 mg, Oral, Daily  finasteride, 5 mg, Oral, Daily  insulin detemir, 50 Units, Subcutaneous, Q12H  insulin lispro, 0-24 Units, Subcutaneous, TID  AC  ipratropium-albuterol, 3 mL, Nebulization, Q4H - RT  latanoprost, 1 drop, Both Eyes, Nightly  levoFLOXacin, 250 mg, Oral, Q24H  metoprolol succinate XL, 12.5 mg, Oral, Q24H  pantoprazole, 40 mg, Intravenous, Q AM  pioglitazone, 30 mg, Oral, Daily  rosuvastatin, 10 mg, Oral, Nightly  sodium chloride, 10 mL, Intravenous, Q12H  timolol, 1 drop, Both Eyes, BID      Continuous Infusions:   PRN Meds:.•  acetaminophen **OR** acetaminophen **OR** acetaminophen  •  albuterol  •  dextrose  •  dextrose  •  dextrose  •  dextrose  •  famotidine  •  glucagon (human recombinant)  •  glucagon (human recombinant)    Assessment & Plan   Assessment & Plan     Active Hospital Problems    Diagnosis  POA   • **Odynophagia [R13.10]  Unknown   • Self-catheterizes urinary bladder [Z78.9]  Unknown   • Acute UTI (urinary tract infection) [N39.0]  Unknown   • Sepsis (HCC) [A41.9]  Yes   • Type 2 diabetes mellitus with hyperglycemia (HCC) [E11.65]  Yes   • Pneumonia [J18.9]  Yes   • CAD (coronary artery disease) [I25.10]  Yes   • Hyperkalemia [E87.5]  Yes   • Acute renal failure superimposed on stage 3 chronic kidney disease (HCC) [N17.9, N18.30]  Yes   • Dyspnea and respiratory abnormalities [R06.00, R06.89]  Yes   • COPD [J44.9]  Yes   • Dyslipidemia [E78.5]  Yes   • HTN [I10]  Yes   • AGATHA on CPAP [G47.33, Z99.89]  Not Applicable      Resolved Hospital Problems   No resolved problems to display.     Brief Hospital Course to date:  Pro Pal is a 82 y.o. male with past medical history of COPD, type 2 diabetes, obstructive sleep apnea on CPAP, coronary artery disease, essential hypertension, BPH, dyslipidemia, complete heart block status post pacemaker, who presented to the hospital with increasing fatigue, worsening shortness of breath and productive cough.    Assessment and plan:  Mild JONATHAN on CKD stage III     Hyperkalemia, resolved  · Likely secondary to dehydration volume depletion  · Baseline creatinine 2  · Creatinine initially  improved with IV fluids then start trending up after holding IV fluids  · Consulted nephrology team today.  Appreciate the help    Severe sepsis 2/2 UTI, improved  Leukocytosis, improved  · CT scan chest essentially ruled out pneumonia and patient with minimal respiratory symptoms  · Urine analysis is positive for UTI and urine culture is pending.  Patient self cath at home which might be the cause of his UTI  · MRSA swab negative, vancomycin discontinued   · Initially received Zosyn that was eventually changed to p.o. Levaquin based on urine culture showing pansensitive E. coli     HHS, resolved  Poorly controlled type 2 diabetes, A1c 11.8%  Medication noncompliance  · Did not take his insulin for 6 days before presenting to the hospital  · Has been drinking plenty of Gatorade.  Blood sugar was markedly evaded on admission.  Started on Glucomander HHS protocol  · Average insulin requirement while hospitalized is 5 to 6 units/h over the last 24 hours (around 100 to 120 units daily).  He is taking insulin Humulin R U5 100, 260 units at home (130 5 in the AM and 120 5 in the PM)  · Insulin drip discontinued 9/6/2022.  Started on insulin Levemir 50 units twice daily with sliding scale insulin and seems to be controlling his blood sugar   · Counseled about the importance to comply with his insulin     Dysphagia to solid  · Evaluated by speech team and felt that his dysphagia is esophageal in origin  · GI performed EGD 0 9/8/2022 that showed small hiatal hernia, GERD and reflux esophagitis.  No evidence of esophageal stricture.  Patient did not undergo dilation today because he was on Plavix.  GI recommended to initiate PPI and monitor for symptoms.  If symptoms did not improve, he might need esophageal dilation of blood thinners     Coronary artery disease  · Status post PCI in May 2021  · Continue ASA, plavix, statin       Hx of complete heart block   · S/p PPM     AGATHA   · CPAP at HS        Expected Discharge Location  and Transportation: Home   Expected Discharge Date: 9/10/2022    DVT prophylaxis:  No DVT prophylaxis order currently exists.     AM-PAC 6 Clicks Score (PT): 19 (09/08/22 2000)    CODE STATUS:   Code Status and Medical Interventions:   Ordered at: 09/05/22 8836     Level Of Support Discussed With:    Patient     Code Status (Patient has no pulse and is not breathing):    CPR (Attempt to Resuscitate)     Medical Interventions (Patient has pulse or is breathing):    Full Support       Liborio Allen MD  09/09/22

## 2022-09-09 NOTE — CONSULTS
Patient Care Team:  Leonard Silva MBBS as PCP - General (Family Medicine)    Chief complaint: fatigue, cough, difficult swallowing.      History of Present Illness: Pro Pal is a 82 y.o. male with past medical history of COPD, type 2 diabetes, obstructive sleep apnea on CPAP, coronary artery disease, essential hypertension, BPH, dyslipidemia, complete heart block status post pacemaker, who presented to the hospital with increasing fatigue, worsening shortness of breath and productive cough. On admission he was noted to have JONATHAN on CKD stage III. Patient is poor historian did mention he has seen Nephrology in remote past. Doesn't have much information regarding CKD. Review of old labs suggest recent baseline cr btw 1.8-2.1mg/dl. Cr on this admission 3.0 with some improvement however most recent cr 2.8mg/dl. Per admission note patient does straight cath at home for urinary retention. He recently underwent EGD for painful swallowing. Did mention poor appetite prior to admission. He du using any NSAIDs at home. No n/v or diarrhea    Review of Systems   Constitutional: Positive for appetite change.   HENT: Negative.    Respiratory: Negative.    Cardiovascular: Negative.    Gastrointestinal: Negative.    Genitourinary: Negative.    Musculoskeletal: Negative.    Skin: Negative.    Neurological: Negative.    Hematological: Negative.    Psychiatric/Behavioral: Negative.         Past Medical History:   Diagnosis Date   • CAD (coronary artery disease) 9/5/2022   • COPD (chronic obstructive pulmonary disease) (HCC)    • Diabetes mellitus (HCC)    • Dyslipidemia 5/24/2021   • HTN 5/24/2021   • Sleep apnea    • STEMI 5/24/2021   ,   Past Surgical History:   Procedure Laterality Date   • CARDIAC CATHETERIZATION N/A 5/24/2021    Procedure: Left Heart Cath;  Surgeon: Elisha Bettencourt MD;  Location: [facility] CATH INVASIVE LOCATION;  Service: Cardiology;  Laterality: N/A;   • ENDOSCOPY N/A 9/8/2022     Procedure: ESOPHAGOGASTRODUODENOSCOPY;  Surgeon: Elijah Gillis MD;  Location: [facility] ENDOSCOPY;  Service: Gastroenterology;  Laterality: N/A;   ,   Family History   Problem Relation Age of Onset   • Heart disease Father    ,   Social History     Socioeconomic History   • Marital status:    Tobacco Use   • Smoking status: Former Smoker     Packs/day: 2.00     Types: Cigarettes   • Smokeless tobacco: Former User   • Tobacco comment: Quit 25 years ago   Vaping Use   • Vaping Use: Never used   Substance and Sexual Activity   • Alcohol use: Never   • Drug use: Never   • Sexual activity: Defer     E-cigarette/Vaping   • E-cigarette/Vaping Use Never User      E-cigarette/Vaping Substances     E-cigarette/Vaping Devices       ,   Medications Prior to Admission   Medication Sig Dispense Refill Last Dose   • amLODIPine (NORVASC) 2.5 MG tablet Take 1 tablet by mouth Daily. 30 tablet 11    • aspirin 81 MG EC tablet Take 81 mg by mouth Daily.      • capsaicin (ZOSTRIX) 0.025 % cream Apply 1 application topically to the appropriate area as directed 2 (Two) Times a Day As Needed.      • clopidogrel (PLAVIX) 75 MG tablet Take 1 tablet by mouth Daily. 30 tablet 11    • Dulaglutide (Trulicity) 1.5 MG/0.5ML solution pen-injector Inject 1.5 mg under the skin into the appropriate area as directed 1 (One) Time Per Week.      • finasteride (PROSCAR) 5 MG tablet Take 5 mg by mouth Daily.      • Insulin Regular Human (HUMULIN R U-500 KWIKPEN SC) Inject  under the skin into the appropriate area as directed 2 (Two) Times a Day With Meals. 135 units with breakfast and 125 units with dinner      • latanoprost (XALATAN) 0.005 % ophthalmic solution Administer 1 drop to both eyes Every Night.      • lisinopril (PRINIVIL,ZESTRIL) 2.5 MG tablet Take 1 tablet by mouth Daily. 30 tablet 11    • metoprolol succinate XL (TOPROL-XL) 25 MG 24 hr tablet Take 0.5 tablets by mouth Daily. 30 tablet 11    • rosuvastatin (CRESTOR) 10 MG tablet Take 1  tablet by mouth Every Night. 30 tablet 11    • tamsulosin (FLOMAX) 0.4 MG capsule 24 hr capsule Take 1 capsule by mouth Daily.      • acetaminophen (TYLENOL) 500 MG tablet Take 500 mg by mouth Every 6 (Six) Hours As Needed for Mild Pain .      • albuterol sulfate  (90 Base) MCG/ACT inhaler Inhale 2 puffs Every 4 (Four) Hours As Needed for Wheezing.      • famotidine (PEPCID) 20 MG tablet Take 20 mg by mouth 2 (Two) Times a Day As Needed for Heartburn.      • loratadine (CLARITIN) 10 MG tablet Take 10 mg by mouth As Needed for Allergies.      • MAGNESIUM GLUCONATE PO Take 400 mg by mouth Daily.      • nitroglycerin (NITROSTAT) 0.4 MG SL tablet 1 under the tongue as needed for angina, may repeat q5mins for up three doses 100 tablet 11    • Omega-3 Fatty Acids (fish oil) 1000 MG capsule capsule Take 1,000 mg by mouth 2 (Two) Times a Day With Meals.      • timolol (TIMOPTIC) 0.5 % ophthalmic solution Administer 1 drop to both eyes 2 (Two) Times a Day.       and Scheduled Meds:  amLODIPine, 2.5 mg, Oral, Daily  aspirin, 81 mg, Oral, Daily  cetirizine, 10 mg, Oral, Daily  clopidogrel, 75 mg, Oral, Daily  finasteride, 5 mg, Oral, Daily  insulin detemir, 50 Units, Subcutaneous, Q12H  insulin lispro, 0-24 Units, Subcutaneous, TID AC  ipratropium-albuterol, 3 mL, Nebulization, Q4H - RT  latanoprost, 1 drop, Both Eyes, Nightly  levoFLOXacin, 250 mg, Oral, Q24H  metoprolol succinate XL, 12.5 mg, Oral, Q24H  pantoprazole, 40 mg, Intravenous, Q AM  pioglitazone, 30 mg, Oral, Daily  rosuvastatin, 10 mg, Oral, Nightly  sodium chloride, 10 mL, Intravenous, Q12H  timolol, 1 drop, Both Eyes, BID        Objective     Vital Signs  Temp:  [98.2 °F (36.8 °C)-98.5 °F (36.9 °C)] 98.2 °F (36.8 °C)  Heart Rate:  [69-76] 75  Resp:  [16-18] 16  BP: (112-147)/(45-66 147/63    No intake/output data recorded.  I/O last 3 completed shifts:  In: 100 [I.V.:100]  Out: -     Physical Exam  Vitals reviewed.   Constitutional:       General: He is  not in acute distress.     Appearance: Normal appearance. He is not ill-appearing.   HENT:      Head: Normocephalic and atraumatic.      Nose: Nose normal. No congestion or rhinorrhea.      Mouth/Throat:      Mouth: Mucous membranes are moist.   Eyes:      Extraocular Movements: Extraocular movements intact.      Pupils: Pupils are equal, round, and reactive to light.   Cardiovascular:      Rate and Rhythm: Normal rate.      Pulses: Normal pulses.      Heart sounds: Normal heart sounds.   Pulmonary:      Effort: Pulmonary effort is normal. No respiratory distress.      Breath sounds: Normal breath sounds. No stridor.   Abdominal:      General: Abdomen is flat.   Neurological:      General: No focal deficit present.      Mental Status: He is alert and oriented to person, place, and time. Mental status is at baseline.         Results Review:    I reviewed the patient's new clinical results.    WBC WBC   Date Value Ref Range Status   09/09/2022 14.19 (H) 3.40 - 10.80 10*3/mm3 Final   09/08/2022 16.05 (H) 3.40 - 10.80 10*3/mm3 Final   09/07/2022 16.84 (H) 3.40 - 10.80 10*3/mm3 Final      HGB Hemoglobin   Date Value Ref Range Status   09/09/2022 12.3 (L) 13.0 - 17.7 g/dL Final   09/09/2022 12.1 (L) 13.0 - 17.7 g/dL Final   09/08/2022 11.6 (L) 13.0 - 17.7 g/dL Final   09/08/2022 11.5 (L) 13.0 - 17.7 g/dL Final   09/08/2022 12.2 (L) 13.0 - 17.7 g/dL Final   09/07/2022 12.5 (L) 13.0 - 17.7 g/dL Final   09/07/2022 11.9 (L) 13.0 - 17.7 g/dL Final   09/07/2022 11.7 (L) 13.0 - 17.7 g/dL Final   09/07/2022 12.0 (L) 13.0 - 17.7 g/dL Final   09/06/2022 10.9 (L) 13.0 - 17.7 g/dL Final   09/06/2022 10.4 (L) 13.0 - 17.7 g/dL Final      HCT Hematocrit   Date Value Ref Range Status   09/09/2022 37.7 37.5 - 51.0 % Final   09/09/2022 37.2 (L) 37.5 - 51.0 % Final   09/08/2022 32.7 (L) 37.5 - 51.0 % Final   09/08/2022 33.6 (L) 37.5 - 51.0 % Final   09/08/2022 36.1 (L) 37.5 - 51.0 % Final   09/07/2022 35.8 (L) 37.5 - 51.0 % Final    09/07/2022 34.9 (L) 37.5 - 51.0 % Final   09/07/2022 37.2 (L) 37.5 - 51.0 % Final   09/07/2022 32.7 (L) 37.5 - 51.0 % Final   09/06/2022 33.6 (L) 37.5 - 51.0 % Final   09/06/2022 31.6 (L) 37.5 - 51.0 % Final      Platlets No results found for: LABPLAT   MCV MCV   Date Value Ref Range Status   09/09/2022 89.4 79.0 - 97.0 fL Final   09/08/2022 90.9 79.0 - 97.0 fL Final   09/07/2022 92.4 79.0 - 97.0 fL Final          Sodium Sodium   Date Value Ref Range Status   09/09/2022 136 136 - 145 mmol/L Final   09/08/2022 135 (L) 136 - 145 mmol/L Final   09/07/2022 137 136 - 145 mmol/L Final      Potassium Potassium   Date Value Ref Range Status   09/09/2022 4.6 3.5 - 5.2 mmol/L Final   09/08/2022 4.6 3.5 - 5.2 mmol/L Final   09/07/2022 5.2 3.5 - 5.2 mmol/L Final      Chloride Chloride   Date Value Ref Range Status   09/09/2022 105 98 - 107 mmol/L Final   09/08/2022 102 98 - 107 mmol/L Final   09/07/2022 103 98 - 107 mmol/L Final      CO2 CO2   Date Value Ref Range Status   09/09/2022 16.0 (L) 22.0 - 29.0 mmol/L Final   09/08/2022 20.0 (L) 22.0 - 29.0 mmol/L Final   09/07/2022 17.0 (L) 22.0 - 29.0 mmol/L Final      BUN BUN   Date Value Ref Range Status   09/09/2022 71 (H) 8 - 23 mg/dL Final   09/08/2022 67 (H) 8 - 23 mg/dL Final   09/07/2022 71 (H) 8 - 23 mg/dL Final      Creatinine Creatinine   Date Value Ref Range Status   09/09/2022 2.83 (H) 0.76 - 1.27 mg/dL Final   09/08/2022 2.64 (H) 0.76 - 1.27 mg/dL Final   09/07/2022 2.91 (H) 0.76 - 1.27 mg/dL Final      Calcium Calcium   Date Value Ref Range Status   09/09/2022 9.2 8.6 - 10.5 mg/dL Final   09/08/2022 9.3 8.6 - 10.5 mg/dL Final   09/07/2022 8.5 (L) 8.6 - 10.5 mg/dL Final      PO4 No results found for: CAPO4   Albumin Albumin   Date Value Ref Range Status   09/08/2022 3.30 (L) 3.50 - 5.20 g/dL Final   09/07/2022 2.90 (L) 3.50 - 5.20 g/dL Final      Magnesium Magnesium   Date Value Ref Range Status   09/08/2022 1.8 1.6 - 2.4 mg/dL Final   09/07/2022 1.9 1.6 - 2.4 mg/dL  Final      Uric Acid No results found for: URICACID         Assessment & Plan       Odynophagia    COPD    Dyslipidemia    AGATHA on CPAP    HTN    Sepsis (HCC)    Type 2 diabetes mellitus with hyperglycemia (HCC)    Pneumonia    CAD (coronary artery disease)    Hyperkalemia    Acute renal failure superimposed on stage 3 chronic kidney disease (HCC)    Dyspnea and respiratory abnormalities    Self-catheterizes urinary bladder    Acute UTI (urinary tract infection)      Assessment & Plan     Acute kidney injury on CKD stage III:  Baseline cr ~ 1.8mg/dl-2.0mg/dl Cr on this admission 3.2 mg Most recent 2.8mg/dl Bun 71. Etiology likely hemodynamic injury in the setting of infection. Given hx of urinary retention requiring straight cath will order renal US to rule out obstruction.    CKD stage III: UA large glucose, Blood+, protein trace, wbc+    Met acidosis: Due to JONATHAN on CKD     Urinary retention: Does straigh cath at home    Odynophagia: Underwent EGD with GI     Hyperkalemia: On admission. Improved    Recs  Will try fluid challenge to determine renal response. Start d5w+150meq of na-bicarb @755/cchr. Check renal US, UPC, Urine na, cl and cr. Daily labs. Strict I/O. Avoid nephrotoxic agents. If no response to IV fluids likely have tubular injury then. Dose meds to eGFR.    I discussed the patients findings and my recommendations with patient    Glen Corona MD  09/09/22  15:07 EDT

## 2022-09-10 ENCOUNTER — APPOINTMENT (OUTPATIENT)
Dept: GENERAL RADIOLOGY | Facility: HOSPITAL | Age: 82
End: 2022-09-10

## 2022-09-10 LAB
ALBUMIN UR-MCNC: 11.7 MG/DL
ANION GAP SERPL CALCULATED.3IONS-SCNC: 13 MMOL/L (ref 5–15)
BACTERIA SPEC AEROBE CULT: NORMAL
BACTERIA SPEC AEROBE CULT: NORMAL
BASOPHILS # BLD AUTO: 0.07 10*3/MM3 (ref 0–0.2)
BASOPHILS NFR BLD AUTO: 0.5 % (ref 0–1.5)
BUN SERPL-MCNC: 67 MG/DL (ref 8–23)
BUN/CREAT SERPL: 23.7 (ref 7–25)
CALCIUM SPEC-SCNC: 9.3 MG/DL (ref 8.6–10.5)
CHLORIDE SERPL-SCNC: 100 MMOL/L (ref 98–107)
CO2 SERPL-SCNC: 25 MMOL/L (ref 22–29)
CREAT SERPL-MCNC: 2.83 MG/DL (ref 0.76–1.27)
CREAT UR-MCNC: 66.6 MG/DL
CREAT UR-MCNC: 69.4 MG/DL
DEPRECATED RDW RBC AUTO: 43.5 FL (ref 37–54)
EGFRCR SERPLBLD CKD-EPI 2021: 21.4 ML/MIN/1.73
EOSINOPHIL # BLD AUTO: 0.81 10*3/MM3 (ref 0–0.4)
EOSINOPHIL NFR BLD AUTO: 5.7 % (ref 0.3–6.2)
ERYTHROCYTE [DISTWIDTH] IN BLOOD BY AUTOMATED COUNT: 13.5 % (ref 12.3–15.4)
GLUCOSE BLDC GLUCOMTR-MCNC: 137 MG/DL (ref 70–130)
GLUCOSE BLDC GLUCOMTR-MCNC: 145 MG/DL (ref 70–130)
GLUCOSE BLDC GLUCOMTR-MCNC: 157 MG/DL (ref 70–130)
GLUCOSE BLDC GLUCOMTR-MCNC: 178 MG/DL (ref 70–130)
GLUCOSE SERPL-MCNC: 150 MG/DL (ref 65–99)
HCT VFR BLD AUTO: 30.2 % (ref 37.5–51)
HCT VFR BLD AUTO: 33.2 % (ref 37.5–51)
HCT VFR BLD AUTO: 35 % (ref 37.5–51)
HCT VFR BLD AUTO: 37.1 % (ref 37.5–51)
HGB BLD-MCNC: 11.2 G/DL (ref 13–17.7)
HGB BLD-MCNC: 11.4 G/DL (ref 13–17.7)
HGB BLD-MCNC: 12 G/DL (ref 13–17.7)
HGB BLD-MCNC: 12.4 G/DL (ref 13–17.7)
IMM GRANULOCYTES # BLD AUTO: 0.31 10*3/MM3 (ref 0–0.05)
IMM GRANULOCYTES NFR BLD AUTO: 2.2 % (ref 0–0.5)
LYMPHOCYTES # BLD AUTO: 2.91 10*3/MM3 (ref 0.7–3.1)
LYMPHOCYTES NFR BLD AUTO: 20.6 % (ref 19.6–45.3)
MCH RBC QN AUTO: 29.3 PG (ref 26.6–33)
MCHC RBC AUTO-ENTMCNC: 33.4 G/DL (ref 31.5–35.7)
MCV RBC AUTO: 87.7 FL (ref 79–97)
MICROALBUMIN/CREAT UR: 168.6 MG/G
MONOCYTES # BLD AUTO: 1.03 10*3/MM3 (ref 0.1–0.9)
MONOCYTES NFR BLD AUTO: 7.3 % (ref 5–12)
NEUTROPHILS NFR BLD AUTO: 63.7 % (ref 42.7–76)
NEUTROPHILS NFR BLD AUTO: 9 10*3/MM3 (ref 1.7–7)
NRBC BLD AUTO-RTO: 0 /100 WBC (ref 0–0.2)
PLATELET # BLD AUTO: 479 10*3/MM3 (ref 140–450)
PMV BLD AUTO: 10.1 FL (ref 6–12)
POTASSIUM SERPL-SCNC: 4.4 MMOL/L (ref 3.5–5.2)
RBC # BLD AUTO: 4.23 10*6/MM3 (ref 4.14–5.8)
SODIUM SERPL-SCNC: 138 MMOL/L (ref 136–145)
WBC NRBC COR # BLD: 14.13 10*3/MM3 (ref 3.4–10.8)

## 2022-09-10 PROCEDURE — 94664 DEMO&/EVAL PT USE INHALER: CPT

## 2022-09-10 PROCEDURE — 85018 HEMOGLOBIN: CPT | Performed by: INTERNAL MEDICINE

## 2022-09-10 PROCEDURE — G0378 HOSPITAL OBSERVATION PER HR: HCPCS

## 2022-09-10 PROCEDURE — 94799 UNLISTED PULMONARY SVC/PX: CPT

## 2022-09-10 PROCEDURE — 85014 HEMATOCRIT: CPT | Performed by: INTERNAL MEDICINE

## 2022-09-10 PROCEDURE — 63710000001 ROSUVASTATIN 10 MG TABLET: Performed by: INTERNAL MEDICINE

## 2022-09-10 PROCEDURE — A9270 NON-COVERED ITEM OR SERVICE: HCPCS | Performed by: INTERNAL MEDICINE

## 2022-09-10 PROCEDURE — 63710000001 ASPIRIN 81 MG TABLET DELAYED-RELEASE: Performed by: INTERNAL MEDICINE

## 2022-09-10 PROCEDURE — 63710000001 INSULIN LISPRO (HUMAN) PER 5 UNITS: Performed by: INTERNAL MEDICINE

## 2022-09-10 PROCEDURE — 63710000001 INSULIN DETEMIR PER 5 UNITS: Performed by: INTERNAL MEDICINE

## 2022-09-10 PROCEDURE — 63710000001 CETIRIZINE 10 MG TABLET: Performed by: INTERNAL MEDICINE

## 2022-09-10 PROCEDURE — 63710000001 GUAIFENESIN-DEXTROMETHORPHAN 100-10 MG/5ML SYRUP: Performed by: INTERNAL MEDICINE

## 2022-09-10 PROCEDURE — 80048 BASIC METABOLIC PNL TOTAL CA: CPT | Performed by: INTERNAL MEDICINE

## 2022-09-10 PROCEDURE — 94761 N-INVAS EAR/PLS OXIMETRY MLT: CPT

## 2022-09-10 PROCEDURE — 63710000001 FINASTERIDE 5 MG TABLET: Performed by: INTERNAL MEDICINE

## 2022-09-10 PROCEDURE — 63710000001 PIOGLITAZONE 15 MG TABLET: Performed by: INTERNAL MEDICINE

## 2022-09-10 PROCEDURE — 63710000001 CLOPIDOGREL 75 MG TABLET: Performed by: INTERNAL MEDICINE

## 2022-09-10 PROCEDURE — 71045 X-RAY EXAM CHEST 1 VIEW: CPT

## 2022-09-10 PROCEDURE — 99225 PR SBSQ OBSERVATION CARE/DAY 25 MINUTES: CPT | Performed by: INTERNAL MEDICINE

## 2022-09-10 PROCEDURE — 63710000001 LEVOFLOXACIN 500 MG TABLET: Performed by: INTERNAL MEDICINE

## 2022-09-10 PROCEDURE — 96366 THER/PROPH/DIAG IV INF ADDON: CPT

## 2022-09-10 PROCEDURE — 63710000001 AMLODIPINE 2.5 MG TABLET: Performed by: INTERNAL MEDICINE

## 2022-09-10 PROCEDURE — 63710000001 METOPROLOL SUCCINATE XL 25 MG TABLET SUSTAINED-RELEASE 24 HOUR: Performed by: INTERNAL MEDICINE

## 2022-09-10 PROCEDURE — 82962 GLUCOSE BLOOD TEST: CPT

## 2022-09-10 PROCEDURE — 85025 COMPLETE CBC W/AUTO DIFF WBC: CPT | Performed by: INTERNAL MEDICINE

## 2022-09-10 RX ORDER — PANTOPRAZOLE SODIUM 40 MG/1
40 TABLET, DELAYED RELEASE ORAL
Status: DISCONTINUED | OUTPATIENT
Start: 2022-09-10 | End: 2022-09-10

## 2022-09-10 RX ORDER — GUAIFENESIN/DEXTROMETHORPHAN 100-10MG/5
10 SYRUP ORAL EVERY 4 HOURS PRN
Status: DISCONTINUED | OUTPATIENT
Start: 2022-09-10 | End: 2022-09-14 | Stop reason: HOSPADM

## 2022-09-10 RX ORDER — PANTOPRAZOLE SODIUM 40 MG/1
40 TABLET, DELAYED RELEASE ORAL
Status: DISCONTINUED | OUTPATIENT
Start: 2022-09-11 | End: 2022-09-14 | Stop reason: HOSPADM

## 2022-09-10 RX ORDER — SODIUM CHLORIDE 9 MG/ML
50 INJECTION, SOLUTION INTRAVENOUS CONTINUOUS
Status: DISCONTINUED | OUTPATIENT
Start: 2022-09-10 | End: 2022-09-11

## 2022-09-10 RX ADMIN — METOPROLOL SUCCINATE 12.5 MG: 25 TABLET, EXTENDED RELEASE ORAL at 08:51

## 2022-09-10 RX ADMIN — CETIRIZINE HYDROCHLORIDE 10 MG: 10 TABLET, FILM COATED ORAL at 08:51

## 2022-09-10 RX ADMIN — GUAIFENESIN AND DEXTROMETHORPHAN 10 ML: 100; 10 SYRUP ORAL at 22:16

## 2022-09-10 RX ADMIN — AMLODIPINE BESYLATE 2.5 MG: 2.5 TABLET ORAL at 08:52

## 2022-09-10 RX ADMIN — ASPIRIN 81 MG: 81 TABLET, COATED ORAL at 08:50

## 2022-09-10 RX ADMIN — INSULIN DETEMIR 50 UNITS: 100 INJECTION, SOLUTION SUBCUTANEOUS at 08:55

## 2022-09-10 RX ADMIN — Medication 10 ML: at 22:16

## 2022-09-10 RX ADMIN — INSULIN LISPRO 4 UNITS: 100 INJECTION, SOLUTION INTRAVENOUS; SUBCUTANEOUS at 11:48

## 2022-09-10 RX ADMIN — IPRATROPIUM BROMIDE AND ALBUTEROL SULFATE 3 ML: .5; 3 SOLUTION RESPIRATORY (INHALATION) at 22:45

## 2022-09-10 RX ADMIN — Medication 10 ML: at 08:52

## 2022-09-10 RX ADMIN — GUAIFENESIN AND DEXTROMETHORPHAN 10 ML: 100; 10 SYRUP ORAL at 18:32

## 2022-09-10 RX ADMIN — IPRATROPIUM BROMIDE AND ALBUTEROL SULFATE 3 ML: .5; 3 SOLUTION RESPIRATORY (INHALATION) at 06:47

## 2022-09-10 RX ADMIN — TIMOLOL MALEATE 1 DROP: 5 SOLUTION/ DROPS OPHTHALMIC at 08:51

## 2022-09-10 RX ADMIN — SODIUM CHLORIDE 50 ML/HR: 9 INJECTION, SOLUTION INTRAVENOUS at 12:38

## 2022-09-10 RX ADMIN — LEVOFLOXACIN 250 MG: 500 TABLET, FILM COATED ORAL at 08:50

## 2022-09-10 RX ADMIN — LATANOPROST 1 DROP: 50 SOLUTION OPHTHALMIC at 22:15

## 2022-09-10 RX ADMIN — IPRATROPIUM BROMIDE AND ALBUTEROL SULFATE 3 ML: .5; 3 SOLUTION RESPIRATORY (INHALATION) at 19:51

## 2022-09-10 RX ADMIN — TIMOLOL MALEATE 1 DROP: 5 SOLUTION/ DROPS OPHTHALMIC at 22:15

## 2022-09-10 RX ADMIN — FINASTERIDE 5 MG: 5 TABLET, FILM COATED ORAL at 08:52

## 2022-09-10 RX ADMIN — SODIUM BICARBONATE 150 MEQ: 84 INJECTION INTRAVENOUS at 06:01

## 2022-09-10 RX ADMIN — IPRATROPIUM BROMIDE AND ALBUTEROL SULFATE 3 ML: .5; 3 SOLUTION RESPIRATORY (INHALATION) at 13:29

## 2022-09-10 RX ADMIN — ROSUVASTATIN CALCIUM 10 MG: 10 TABLET, COATED ORAL at 22:16

## 2022-09-10 RX ADMIN — PIOGLITAZONE 30 MG: 15 TABLET ORAL at 08:50

## 2022-09-10 RX ADMIN — CLOPIDOGREL BISULFATE 75 MG: 75 TABLET ORAL at 08:51

## 2022-09-10 RX ADMIN — INSULIN DETEMIR 50 UNITS: 100 INJECTION, SOLUTION SUBCUTANEOUS at 22:16

## 2022-09-10 RX ADMIN — PANTOPRAZOLE SODIUM 40 MG: 40 INJECTION, POWDER, FOR SOLUTION INTRAVENOUS at 05:35

## 2022-09-10 NOTE — PROGRESS NOTES
Afternoon after he returned from D.    [facility] Medicine Services  PROGRESS NOTE    Patient Name: Pro Pal  : 1940  MRN: [id number]    Date of Admission: 2022  Primary Care Physician: Leonard Silva MBBS    Subjective   Subjective     CC:  Follow-up for shortness of breath and HHS    HPI:  I have seen and evaluated the patient this morning.  Comfortable and does not appear to be in distress.  No acute complaints today    ROS:  General : no fevers, chills  CVS: No chest pain, palpitations  Respiratory: No cough, dyspnea  GI: No N/V/D, abd pain  10 point review of systems is negative except for what is mentioned in HPI     Objective   Objective     Vital Signs:   Temp:  [98.2 °F (36.8 °C)-98.6 °F (37 °C)] 98.4 °F (36.9 °C)  Heart Rate:  [57-78] 75  Resp:  [16-20] 20  BP: (140-156)/(56-69) 145/69  Flow (L/min):  [2] 2     Physical Exam:  General: Chronically ill looking.  Conversant and pleasant.  Head: Atraumatic and normocephalic, without obvious abnormality  Eyes:   Conjunctivae and sclerae normal, no Icterus. No pallor  Ears:  Ears appear intact with no abnormalities noted  Throat: No oral lesions, no thrush, oral mucosa moist  Neck: Supple, trachea midline, no thyromegaly  Back:   No kyphoscoliosis present. No tenderness to palpation,   no sacral edema  Lungs: Clear to auscultation bilaterally, equal air entry, no wheezing or crackles  Heart:  Normal S1 and S2, no murmur, no gallop, No JVD, no lower extremity swelling  Abdomen:  Soft, suprapubic tenderness, no organomegaly, normal bowel sounds  Normal bowel sounds, no masses, no organomegaly. Soft, nontender, nondistended, no guarding, no rebound tenderness.  Extremities: No gross abnormalities, no clubbing, pulses palpable and equal bilaterally  Skin: No bleeding, bruising or rash, normal skin turgor and elasticity  Neurologic: Cranial nerves appear intact with no evidence of facial asymmetry, normal motor  and sensory functions in all 4 extremities  Psych: Alert and oriented x 3, normal mood    Results Reviewed:  LAB RESULTS:      Lab 09/09/22  2300 09/09/22  1843 09/09/22  1429 09/09/22  0740 09/09/22  0553 09/08/22  1421 09/08/22  0729 09/07/22  1427 09/07/22  0508 09/06/22  0341 09/06/22  0004 09/05/22  1911   WBC  --   --   --   --  14.19*  --  16.05*  --  16.84*  --  17.27* 22.80*   HEMOGLOBIN 11.7* 11.2* 11.9* 12.3* 12.1*   < > 12.2*   < > 12.0*   < > 12.0* 12.3*   HEMATOCRIT 31.6* 34.4* 36.5* 37.7 37.2*   < > 36.1*   < > 32.7*   < > 32.7* 35.7*   PLATELETS  --   --   --   --  448  --  486*  --  396  --  276 374   NEUTROS ABS  --   --   --   --  9.27*  --  11.10*  --  12.59*  --  14.59* 19.05*   IMMATURE GRANS (ABS)  --   --   --   --  0.39*  --  0.45*  --  0.36*  --  0.20* 0.29*   LYMPHS ABS  --   --   --   --  2.65  --  2.47  --  2.08  --  1.53 1.65   MONOS ABS  --   --   --   --  0.96*  --  1.23*  --  1.40*  --  0.83 1.72*   EOS ABS  --   --   --   --  0.83*  --  0.73*  --  0.37  --  0.08 0.04   MCV  --   --   --   --  89.4  --  90.9  --  92.4  --  93.7 89.3   PROCALCITONIN  --   --   --   --   --   --   --   --   --   --   --  1.32*   LACTATE  --   --   --   --   --   --   --   --   --   --   --  1.9    < > = values in this interval not displayed.         Lab 09/09/22  0553 09/08/22  0729 09/07/22  1427 09/06/22  1039 09/06/22  0341 09/06/22  0004 09/05/22  2227 09/05/22  1911   SODIUM 136 135* 137 135* 132* 127*  --  122*   POTASSIUM 4.6 4.6 5.2 4.8 4.5 4.8  --  5.3*   CHLORIDE 105 102 103 100 97* 92*  --  84*   CO2 16.0* 20.0* 17.0* 22.0 20.0* 18.0*  --  19.0*   ANION GAP 15.0 13.0 17.0* 13.0 15.0 17.0*  --  19.0*   BUN 71* 67* 71* 77* 79* 83*  --  83*   CREATININE 2.83* 2.64* 2.91* 2.98* 3.04* 3.05*  --  3.28*   EGFR 21.4* 23.3* 20.7* 20.1* 19.7* 19.6*  --  18.0*   GLUCOSE 150* 164* 158* 169* 257* 470*   < > 664*   CALCIUM 9.2 9.3 8.5* 8.9 9.2 8.9  --  9.8   MAGNESIUM  --  1.8 1.9 2.0 2.1 2.7*   < >  --     PHOSPHORUS  --  3.9 3.5 2.5 3.1 3.5   < >  --    HEMOGLOBIN A1C  --   --   --   --   --   --   --  11.80*    < > = values in this interval not displayed.         Lab 09/08/22  0729 09/07/22  1427 09/05/22 1911   TOTAL PROTEIN 8.0 6.3 8.7*   ALBUMIN 3.30* 2.90* 3.50   GLOBULIN 4.7 3.4 5.2   ALT (SGPT) 68* 75* 29   AST (SGOT) 43* 75* 35   BILIRUBIN 0.3 0.2 0.4   ALK PHOS 154* 140* 130*         Lab 09/05/22  1911   PROBNP 1,048.0   TROPONIN T <0.010             Lab 09/06/22  0341 09/06/22  0004   ABO TYPING A A   RH TYPING Positive Positive   ANTIBODY SCREEN  --  Negative         Lab 09/05/22 2101   PH, ARTERIAL 7.384   PCO2, ARTERIAL 34.3*   PO2 ART 76.4*   FIO2 21   HCO3 ART 20.4   BASE EXCESS ART -4.0*   CARBOXYHEMOGLOBIN 1.0     Brief Urine Lab Results  (Last result in the past 365 days)      Color   Clarity   Blood   Leuk Est   Nitrite   Protein   CREAT   Urine HCG        09/09/22 1722             69.4         09/09/22 1722             66.6               Microbiology Results Abnormal     Procedure Component Value - Date/Time    Blood Culture - Blood, Arm, Left [[id number]]  (Normal) Collected: 09/05/22 2020    Lab Status: Preliminary result Specimen: Blood from Arm, Left Updated: 09/09/22 2102     Blood Culture No growth at 4 days    Blood Culture - Blood, Wrist, Right [[id number]]  (Normal) Collected: 09/05/22 2015    Lab Status: Preliminary result Specimen: Blood from Wrist, Right Updated: 09/09/22 2102     Blood Culture No growth at 4 days    S. Pneumo Ag Urine or CSF - Urine, Urine, Clean Catch [[id number]]  (Normal) Collected: 09/06/22 0159    Lab Status: Final result Specimen: Urine, Clean Catch Updated: 09/06/22 0929     Strep Pneumo Ag Negative    Legionella Antigen, Urine - Urine, Urine, Clean Catch [[id number]]  (Normal) Collected: 09/06/22 0159    Lab Status: Final result Specimen: Urine, Clean Catch Updated: 09/06/22 0929     LEGIONELLA ANTIGEN, URINE Negative    MRSA Screen, PCR (Inpatient) - Swab,  Nares [[id number]]  (Normal) Collected: 09/06/22 0619    Lab Status: Final result Specimen: Swab from Nares Updated: 09/06/22 0813     MRSA PCR Negative    Narrative:      The negative predictive value of this diagnostic test is high and should only be used to consider de-escalating anti-MRSA therapy. A positive result may indicate colonization with MRSA and must be correlated clinically.  MRSA Negative    Respiratory Panel PCR w/COVID-19(SARS-CoV-2) TREASURE/MAIKOL/ELTON/PAD/COR/MAD/KISHORE In-House, NP Swab in UTM/VTM, 3-4 HR TAT - Swab, Nasopharynx [[id number]]  (Normal) Collected: 09/06/22 0619    Lab Status: Final result Specimen: Swab from Nasopharynx Updated: 09/06/22 0750     ADENOVIRUS, PCR Not Detected     Coronavirus 229E Not Detected     Coronavirus HKU1 Not Detected     Coronavirus NL63 Not Detected     Coronavirus OC43 Not Detected     COVID19 Not Detected     Human Metapneumovirus Not Detected     Human Rhinovirus/Enterovirus Not Detected     Influenza A PCR Not Detected     Influenza B PCR Not Detected     Parainfluenza Virus 1 Not Detected     Parainfluenza Virus 2 Not Detected     Parainfluenza Virus 3 Not Detected     Parainfluenza Virus 4 Not Detected     RSV, PCR Not Detected     Bordetella pertussis pcr Not Detected     Bordetella parapertussis PCR Not Detected     Chlamydophila pneumoniae PCR Not Detected     Mycoplasma pneumo by PCR Not Detected    Narrative:      In the setting of a positive respiratory panel with a viral infection PLUS a negative procalcitonin without other underlying concern for bacterial infection, consider observing off antibiotics or discontinuation of antibiotics and continue supportive care. If the respiratory panel is positive for atypical bacterial infection (Bordetella pertussis, Chlamydophila pneumoniae, or Mycoplasma pneumoniae), consider antibiotic de-escalation to target atypical bacterial infection.    COVID PRE-OP / PRE-PROCEDURE SCREENING ORDER (NO ISOLATION) - Swab,  Nasopharynx [[id number]]  (Normal) Collected: 09/1940    Lab Status: Final result Specimen: Swab from Nasopharynx Updated: 09/05/22 2011    Narrative:      The following orders were created for panel order COVID PRE-OP / PRE-PROCEDURE SCREENING ORDER (NO ISOLATION) - Swab, Nasopharynx.  Procedure                               Abnormality         Status                     ---------                               -----------         ------                     COVID-19 and FLU A/B PCR...[[id number]]  Normal              Final result                 Please view results for these tests on the individual orders.    COVID-19 and FLU A/B PCR - Swab, Nasopharynx [[id number]]  (Normal) Collected: 09/1940    Lab Status: Final result Specimen: Swab from Nasopharynx Updated: 09/05/22 2011     COVID19 Not Detected     Influenza A PCR Not Detected     Influenza B PCR Not Detected    Narrative:      Fact sheet for providers: https://www.fda.gov/media/244895/download    Fact sheet for patients: https://www.fda.gov/media/159190/download    Test performed by PCR.        No radiology results from the last 24 hrs    Results for orders placed during the hospital encounter of 05/24/21    Adult Transthoracic Echo Complete W/ Cont if Necessary Per Protocol    Interpretation Summary  · Estimated left ventricular EF = 55% Left ventricular systolic function is normal.  · Left ventricular diastolic function is consistent with (grade II w/high LAP) pseudonormalization.  · Mild mitral valve regurgitation is present.  · Mild tricuspid valve regurgitation is present.  · Calculated right ventricular systolic pressure from tricuspid regurgitation is 39 mmHg.    I have reviewed the medications:  Scheduled Meds:amLODIPine, 2.5 mg, Oral, Daily  aspirin, 81 mg, Oral, Daily  cetirizine, 10 mg, Oral, Daily  clopidogrel, 75 mg, Oral, Daily  finasteride, 5 mg, Oral, Daily  insulin detemir, 50 Units, Subcutaneous, Q12H  insulin lispro, 0-24 Units,  Subcutaneous, TID AC  ipratropium-albuterol, 3 mL, Nebulization, Q4H - RT  latanoprost, 1 drop, Both Eyes, Nightly  levoFLOXacin, 250 mg, Oral, Q24H  metoprolol succinate XL, 12.5 mg, Oral, Q24H  pantoprazole, 40 mg, Intravenous, Q AM  pioglitazone, 30 mg, Oral, Daily  rosuvastatin, 10 mg, Oral, Nightly  sodium chloride, 10 mL, Intravenous, Q12H  timolol, 1 drop, Both Eyes, BID      Continuous Infusions:sodium bicarbonate drip (greater than 75 mEq/bag), 150 mEq, Last Rate: 150 mEq (09/10/22 0601)      PRN Meds:.•  acetaminophen **OR** acetaminophen **OR** acetaminophen  •  albuterol  •  dextrose  •  dextrose  •  dextrose  •  dextrose  •  famotidine  •  glucagon (human recombinant)  •  glucagon (human recombinant)    Assessment & Plan   Assessment & Plan     Active Hospital Problems    Diagnosis  POA   • **Odynophagia [R13.10]  Unknown   • Self-catheterizes urinary bladder [Z78.9]  Unknown   • Acute UTI (urinary tract infection) [N39.0]  Unknown   • Sepsis (HCC) [A41.9]  Yes   • Type 2 diabetes mellitus with hyperglycemia (HCC) [E11.65]  Yes   • Pneumonia [J18.9]  Yes   • CAD (coronary artery disease) [I25.10]  Yes   • Hyperkalemia [E87.5]  Yes   • Acute renal failure superimposed on stage 3 chronic kidney disease (HCC) [N17.9, N18.30]  Yes   • Dyspnea and respiratory abnormalities [R06.00, R06.89]  Yes   • COPD [J44.9]  Yes   • Dyslipidemia [E78.5]  Yes   • HTN [I10]  Yes   • AGATHA on CPAP [G47.33, Z99.89]  Not Applicable      Resolved Hospital Problems   No resolved problems to display.     Brief Hospital Course to date:  Pro Pal is a 82 y.o. male with past medical history of COPD, type 2 diabetes, obstructive sleep apnea on CPAP, coronary artery disease, essential hypertension, BPH, dyslipidemia, complete heart block status post pacemaker, who presented to the hospital with increasing fatigue, worsening shortness of breath and productive cough.    Assessment and plan:  Mild JONATHAN on CKD stage III      Hyperkalemia, resolved  · Likely secondary to dehydration volume depletion in the context of UTI  · Baseline creatinine 2  · Creatinine trended up currently at 2.8.  Nephrology service consulted.  Received IV fluids yesterday and creatinine remained stable at 2.8  · Continue to monitor his kidney functions and avoid nephrotoxins.    · Appreciate help from Dr. Corona/ nephrology    Severe sepsis 2/2 UTI, improved  Leukocytosis, improved  · CT scan chest essentially ruled out pneumonia and patient with minimal respiratory symptoms  · Urine analysis is positive for UTI and urine culture is pending.  Patient self cath at home which might be the cause of his UTI  · MRSA swab negative, vancomycin discontinued   · Initially received Zosyn that was eventually changed to p.o. Levaquin based on urine culture showing pansensitive E. coli     HHS, resolved  Poorly controlled type 2 diabetes, A1c 11.8%  Medication noncompliance  · Did not take his insulin for 6 days before presenting to the hospital  · Has been drinking plenty of Gatorade.  Blood sugar was markedly evaded on admission.  Started on Glucomander HHS protocol  · Average insulin requirement while hospitalized is 5 to 6 units/h over the last 24 hours (around 100 to 120 units daily).  He is taking insulin Humulin R U5 100, 260 units at home (130 5 in the AM and 120 5 in the PM)  · Insulin drip discontinued 9/6/2022.  Started on insulin Levemir 50 units twice daily with sliding scale insulin and seems to be controlling his blood sugar   · Counseled about the importance to comply with his insulin     Dysphagia to solid  · Evaluated by speech team and felt that his dysphagia is esophageal in origin  · GI performed EGD 0 9/8/2022 that showed small hiatal hernia, GERD and reflux esophagitis.  No evidence of esophageal stricture.  Patient did not undergo dilation today because he was on Plavix.  GI recommended to initiate PPI and monitor for symptoms.  If symptoms did not  improve, he might need esophageal dilation of blood thinners     Coronary artery disease  · Status post PCI in May 2021  · Continue ASA, plavix, statin       Hx of complete heart block   · S/p PPM     AGATHA   · CPAP at HS        Expected Discharge Location and Transportation: Home   Expected Discharge Date: 9/12/2022    DVT prophylaxis:  No DVT prophylaxis order currently exists.     AM-PAC 6 Clicks Score (PT): 19 (09/09/22 2000)    CODE STATUS:   Code Status and Medical Interventions:   Ordered at: 09/05/22 2138     Level Of Support Discussed With:    Patient     Code Status (Patient has no pulse and is not breathing):    CPR (Attempt to Resuscitate)     Medical Interventions (Patient has pulse or is breathing):    Full Support       Liborio Allen MD  09/10/22

## 2022-09-10 NOTE — PROGRESS NOTES
" LOS: 1 day   Patient Care Team:  Leonard Silva MBBS as PCP - General (Family Medicine)    Chief Complaint: JONATHAN on CKD stage III    Subjective     Subjective:  Symptoms:  Stable.  No shortness of breath, chest pain, chest pressure or anxiety.    Diet:  Poor intake.  No nausea or vomiting.    Pain:  He reports no pain.        History taken from: patient    Objective     Vital Sign Min/Max for last 24 hours  Temp  Min: 97.9 °F (36.6 °C)  Max: 98.6 °F (37 °C)   BP  Min: 136/76  Max: 156/68   Pulse  Min: 57  Max: 78   Resp  Min: 16  Max: 20   SpO2  Min: 90 %  Max: 100 %   Flow (L/min)  Min: 2  Max: 2   No data recorded     Flowsheet Rows    Flowsheet Row First Filed Value   Admission Height 185.4 cm (73\") Documented at 09/05/2022 1907   Admission Weight 102 kg (225 lb) Documented at 09/05/2022 1907          I/O this shift:  In: 1393 [P.O.:256; I.V.:1137]  Out: 525 [Urine:525]  I/O last 3 completed shifts:  In: 1940 [I.V.:1940]  Out: -     Objective:  General Appearance:  Comfortable.    Vital signs: (most recent): Blood pressure 136/76, pulse 75, temperature 97.9 °F (36.6 °C), temperature source Oral, resp. rate 20, height 185.4 cm (73\"), weight 96.1 kg (211 lb 14.4 oz), SpO2 92 %.  Vital signs are normal.    Output: Producing urine.    HEENT: Normal HEENT exam.    Lungs:  Normal effort and normal respiratory rate.  Breath sounds clear to auscultation.  He is not in respiratory distress.  No decreased breath sounds or wheezes.    Heart: Normal rate.  Regular rhythm.  S1 normal and S2 normal.  No murmur.   Abdomen: Abdomen is soft.    Extremities: Normal range of motion.  There is no dependent edema or local swelling.    Pulses: Distal pulses are intact.    Neurological: Patient is alert and oriented to person, place and time.  Normal strength.    Pupils:  Pupils are equal, round, and reactive to light.    Skin:  Warm.              Results Review:     I reviewed the patient's new clinical results.    WBC WBC "   Date Value Ref Range Status   09/10/2022 14.13 (H) 3.40 - 10.80 10*3/mm3 Final   09/09/2022 14.19 (H) 3.40 - 10.80 10*3/mm3 Final   09/08/2022 16.05 (H) 3.40 - 10.80 10*3/mm3 Final      HGB Hemoglobin   Date Value Ref Range Status   09/10/2022 12.4 (L) 13.0 - 17.7 g/dL Final   09/09/2022 11.7 (L) 13.0 - 17.7 g/dL Final   09/09/2022 11.2 (L) 13.0 - 17.7 g/dL Final   09/09/2022 11.9 (L) 13.0 - 17.7 g/dL Final   09/09/2022 12.3 (L) 13.0 - 17.7 g/dL Final   09/09/2022 12.1 (L) 13.0 - 17.7 g/dL Final   09/08/2022 11.6 (L) 13.0 - 17.7 g/dL Final   09/08/2022 11.5 (L) 13.0 - 17.7 g/dL Final   09/08/2022 12.2 (L) 13.0 - 17.7 g/dL Final   09/07/2022 12.5 (L) 13.0 - 17.7 g/dL Final   09/07/2022 11.9 (L) 13.0 - 17.7 g/dL Final   09/07/2022 11.7 (L) 13.0 - 17.7 g/dL Final      HCT Hematocrit   Date Value Ref Range Status   09/10/2022 37.1 (L) 37.5 - 51.0 % Final   09/09/2022 31.6 (L) 37.5 - 51.0 % Final   09/09/2022 34.4 (L) 37.5 - 51.0 % Final   09/09/2022 36.5 (L) 37.5 - 51.0 % Corrected     Comment:     Corrected result. Previous result was 30.4 % on 9/9/2022 at 1521 EDT.   09/09/2022 37.7 37.5 - 51.0 % Final   09/09/2022 37.2 (L) 37.5 - 51.0 % Final   09/08/2022 32.7 (L) 37.5 - 51.0 % Final   09/08/2022 33.6 (L) 37.5 - 51.0 % Final   09/08/2022 36.1 (L) 37.5 - 51.0 % Final   09/07/2022 35.8 (L) 37.5 - 51.0 % Final   09/07/2022 34.9 (L) 37.5 - 51.0 % Final   09/07/2022 37.2 (L) 37.5 - 51.0 % Final      Platlets No results found for: LABPLAT   MCV MCV   Date Value Ref Range Status   09/10/2022 87.7 79.0 - 97.0 fL Final   09/09/2022 89.4 79.0 - 97.0 fL Final   09/08/2022 90.9 79.0 - 97.0 fL Final          Sodium Sodium   Date Value Ref Range Status   09/10/2022 138 136 - 145 mmol/L Final   09/09/2022 136 136 - 145 mmol/L Final   09/08/2022 135 (L) 136 - 145 mmol/L Final   09/07/2022 137 136 - 145 mmol/L Final      Potassium Potassium   Date Value Ref Range Status   09/10/2022 4.4 3.5 - 5.2 mmol/L Final   09/09/2022 4.6 3.5 -  5.2 mmol/L Final   09/08/2022 4.6 3.5 - 5.2 mmol/L Final   09/07/2022 5.2 3.5 - 5.2 mmol/L Final      Chloride Chloride   Date Value Ref Range Status   09/10/2022 100 98 - 107 mmol/L Final   09/09/2022 105 98 - 107 mmol/L Final   09/08/2022 102 98 - 107 mmol/L Final   09/07/2022 103 98 - 107 mmol/L Final      CO2 CO2   Date Value Ref Range Status   09/10/2022 25.0 22.0 - 29.0 mmol/L Final   09/09/2022 16.0 (L) 22.0 - 29.0 mmol/L Final   09/08/2022 20.0 (L) 22.0 - 29.0 mmol/L Final   09/07/2022 17.0 (L) 22.0 - 29.0 mmol/L Final      BUN BUN   Date Value Ref Range Status   09/10/2022 67 (H) 8 - 23 mg/dL Final   09/09/2022 71 (H) 8 - 23 mg/dL Final   09/08/2022 67 (H) 8 - 23 mg/dL Final   09/07/2022 71 (H) 8 - 23 mg/dL Final      Creatinine Creatinine   Date Value Ref Range Status   09/10/2022 2.83 (H) 0.76 - 1.27 mg/dL Final   09/09/2022 2.83 (H) 0.76 - 1.27 mg/dL Final   09/08/2022 2.64 (H) 0.76 - 1.27 mg/dL Final   09/07/2022 2.91 (H) 0.76 - 1.27 mg/dL Final      Calcium Calcium   Date Value Ref Range Status   09/10/2022 9.3 8.6 - 10.5 mg/dL Final   09/09/2022 9.2 8.6 - 10.5 mg/dL Final   09/08/2022 9.3 8.6 - 10.5 mg/dL Final   09/07/2022 8.5 (L) 8.6 - 10.5 mg/dL Final      PO4 No results found for: CAPO4   Albumin Albumin   Date Value Ref Range Status   09/08/2022 3.30 (L) 3.50 - 5.20 g/dL Final   09/07/2022 2.90 (L) 3.50 - 5.20 g/dL Final      Magnesium Magnesium   Date Value Ref Range Status   09/08/2022 1.8 1.6 - 2.4 mg/dL Final   09/07/2022 1.9 1.6 - 2.4 mg/dL Final      Uric Acid No results found for: URICACID     Medication Review: yes    Assessment & Plan       Odynophagia    COPD    Dyslipidemia    AGATHA on CPAP    HTN    Sepsis (HCC)    Type 2 diabetes mellitus with hyperglycemia (HCC)    Pneumonia    CAD (coronary artery disease)    Hyperkalemia    Acute renal failure superimposed on stage 3 chronic kidney disease (HCC)    Dyspnea and respiratory abnormalities    Self-catheterizes urinary bladder    Acute  UTI (urinary tract infection)      Assessment & Plan     Acute kidney injury on CKD stage III:  Baseline cr ~ 1.8mg/dl-2.0mg/dl Cr on this admission 3.2 mg Most recent 2.8mg/dl Bun 71. Etiology likely hemodynamic injury in the setting of infection. Given hx of urinary retention requiring straight cath will order renal US     CKD stage III: UA large glucose, Blood+, protein trace, wbc+. Urine microalb/cr 168.      Met acidosis: Due to JONATHAN on CKD      Urinary retention: Does straigh cath at home     Odynophagia: Underwent EGD with GI      Hyperkalemia: On admission. Improved     Recs  No significant change in serum cr with iv fluids. Acidosis has corrected with bicarb based fluids. Will switch him to NS @ 50cc/hr for 1 more day. If no improvement will stop IV fluids tomorrow.. Daily labs. Strict I/O. Avoid nephrotoxic agents. If no response to IV fluids likely have tubular injury then. Dose meds to eGFR.    Glen Corona MD  09/10/22  12:21 EDT

## 2022-09-10 NOTE — PLAN OF CARE
Goal Outcome Evaluation:           Progress: no change  Outcome Evaluation: Patient A&O x4, Self cath and up with stand-by assist to bathroom. Currently on room air, VSS. NS infusing at 50ml/hr. Will continue to monitor labs per nephrology.

## 2022-09-11 LAB
ANION GAP SERPL CALCULATED.3IONS-SCNC: 13 MMOL/L (ref 5–15)
BASOPHILS # BLD AUTO: 0.05 10*3/MM3 (ref 0–0.2)
BASOPHILS NFR BLD AUTO: 0.4 % (ref 0–1.5)
BUN SERPL-MCNC: 66 MG/DL (ref 8–23)
BUN/CREAT SERPL: 24.1 (ref 7–25)
CALCIUM SPEC-SCNC: 8.9 MG/DL (ref 8.6–10.5)
CHLORIDE SERPL-SCNC: 105 MMOL/L (ref 98–107)
CO2 SERPL-SCNC: 23 MMOL/L (ref 22–29)
CREAT SERPL-MCNC: 2.74 MG/DL (ref 0.76–1.27)
DEPRECATED RDW RBC AUTO: 43.2 FL (ref 37–54)
EGFRCR SERPLBLD CKD-EPI 2021: 22.3 ML/MIN/1.73
EOSINOPHIL # BLD AUTO: 0.7 10*3/MM3 (ref 0–0.4)
EOSINOPHIL NFR BLD AUTO: 6 % (ref 0.3–6.2)
ERYTHROCYTE [DISTWIDTH] IN BLOOD BY AUTOMATED COUNT: 13.9 % (ref 12.3–15.4)
GLUCOSE BLDC GLUCOMTR-MCNC: 117 MG/DL (ref 70–130)
GLUCOSE BLDC GLUCOMTR-MCNC: 175 MG/DL (ref 70–130)
GLUCOSE BLDC GLUCOMTR-MCNC: 252 MG/DL (ref 70–130)
GLUCOSE BLDC GLUCOMTR-MCNC: 263 MG/DL (ref 70–130)
GLUCOSE SERPL-MCNC: 102 MG/DL (ref 65–99)
HCT VFR BLD AUTO: 33.5 % (ref 37.5–51)
HCT VFR BLD AUTO: 33.6 % (ref 37.5–51)
HCT VFR BLD AUTO: 33.8 % (ref 37.5–51)
HCT VFR BLD AUTO: 34.6 % (ref 37.5–51)
HCT VFR BLD AUTO: 34.9 % (ref 37.5–51)
HCT VFR BLD AUTO: 34.9 % (ref 37.5–51)
HGB BLD-MCNC: 10.9 G/DL (ref 13–17.7)
HGB BLD-MCNC: 11 G/DL (ref 13–17.7)
HGB BLD-MCNC: 11.9 G/DL (ref 13–17.7)
HGB BLD-MCNC: 12.3 G/DL (ref 13–17.7)
IMM GRANULOCYTES # BLD AUTO: 0.21 10*3/MM3 (ref 0–0.05)
IMM GRANULOCYTES NFR BLD AUTO: 1.8 % (ref 0–0.5)
LYMPHOCYTES # BLD AUTO: 2.56 10*3/MM3 (ref 0.7–3.1)
LYMPHOCYTES NFR BLD AUTO: 22.1 % (ref 19.6–45.3)
MCH RBC QN AUTO: 31.4 PG (ref 26.6–33)
MCHC RBC AUTO-ENTMCNC: 34.1 G/DL (ref 31.5–35.7)
MCV RBC AUTO: 92.1 FL (ref 79–97)
MONOCYTES # BLD AUTO: 0.83 10*3/MM3 (ref 0.1–0.9)
MONOCYTES NFR BLD AUTO: 7.2 % (ref 5–12)
NEUTROPHILS NFR BLD AUTO: 62.5 % (ref 42.7–76)
NEUTROPHILS NFR BLD AUTO: 7.24 10*3/MM3 (ref 1.7–7)
NRBC BLD AUTO-RTO: 0 /100 WBC (ref 0–0.2)
PLATELET # BLD AUTO: 521 10*3/MM3 (ref 140–450)
PMV BLD AUTO: 9.8 FL (ref 6–12)
POTASSIUM SERPL-SCNC: 4.7 MMOL/L (ref 3.5–5.2)
RBC # BLD AUTO: 3.79 10*6/MM3 (ref 4.14–5.8)
SODIUM SERPL-SCNC: 141 MMOL/L (ref 136–145)
WBC NRBC COR # BLD: 11.59 10*3/MM3 (ref 3.4–10.8)

## 2022-09-11 PROCEDURE — 94799 UNLISTED PULMONARY SVC/PX: CPT

## 2022-09-11 PROCEDURE — A9270 NON-COVERED ITEM OR SERVICE: HCPCS | Performed by: INTERNAL MEDICINE

## 2022-09-11 PROCEDURE — 63710000001 ROSUVASTATIN 10 MG TABLET: Performed by: INTERNAL MEDICINE

## 2022-09-11 PROCEDURE — A9270 NON-COVERED ITEM OR SERVICE: HCPCS | Performed by: NURSE PRACTITIONER

## 2022-09-11 PROCEDURE — G0378 HOSPITAL OBSERVATION PER HR: HCPCS

## 2022-09-11 PROCEDURE — 80048 BASIC METABOLIC PNL TOTAL CA: CPT | Performed by: INTERNAL MEDICINE

## 2022-09-11 PROCEDURE — 85025 COMPLETE CBC W/AUTO DIFF WBC: CPT | Performed by: INTERNAL MEDICINE

## 2022-09-11 PROCEDURE — 94761 N-INVAS EAR/PLS OXIMETRY MLT: CPT

## 2022-09-11 PROCEDURE — 99232 SBSQ HOSP IP/OBS MODERATE 35: CPT | Performed by: NURSE PRACTITIONER

## 2022-09-11 PROCEDURE — 85018 HEMOGLOBIN: CPT | Performed by: INTERNAL MEDICINE

## 2022-09-11 PROCEDURE — 94664 DEMO&/EVAL PT USE INHALER: CPT

## 2022-09-11 PROCEDURE — 63710000001 PIOGLITAZONE 15 MG TABLET: Performed by: INTERNAL MEDICINE

## 2022-09-11 PROCEDURE — 63710000001 FINASTERIDE 5 MG TABLET: Performed by: INTERNAL MEDICINE

## 2022-09-11 PROCEDURE — 63710000001 AMLODIPINE 2.5 MG TABLET: Performed by: INTERNAL MEDICINE

## 2022-09-11 PROCEDURE — 85014 HEMATOCRIT: CPT | Performed by: INTERNAL MEDICINE

## 2022-09-11 PROCEDURE — 63710000001 MIRTAZAPINE 15 MG TABLET: Performed by: NURSE PRACTITIONER

## 2022-09-11 PROCEDURE — 63710000001 CETIRIZINE 10 MG TABLET: Performed by: INTERNAL MEDICINE

## 2022-09-11 PROCEDURE — 63710000001 ASPIRIN 81 MG TABLET DELAYED-RELEASE: Performed by: INTERNAL MEDICINE

## 2022-09-11 PROCEDURE — 63710000001 METOPROLOL SUCCINATE XL 25 MG TABLET SUSTAINED-RELEASE 24 HOUR: Performed by: INTERNAL MEDICINE

## 2022-09-11 PROCEDURE — 63710000001 INSULIN LISPRO (HUMAN) PER 5 UNITS: Performed by: INTERNAL MEDICINE

## 2022-09-11 PROCEDURE — 63710000001 LEVOFLOXACIN 500 MG TABLET: Performed by: INTERNAL MEDICINE

## 2022-09-11 PROCEDURE — 82962 GLUCOSE BLOOD TEST: CPT

## 2022-09-11 PROCEDURE — 63710000001 PANTOPRAZOLE 40 MG TABLET DELAYED-RELEASE: Performed by: INTERNAL MEDICINE

## 2022-09-11 PROCEDURE — 63710000001 CLOPIDOGREL 75 MG TABLET: Performed by: INTERNAL MEDICINE

## 2022-09-11 PROCEDURE — 63710000001 GUAIFENESIN-DEXTROMETHORPHAN 100-10 MG/5ML SYRUP: Performed by: INTERNAL MEDICINE

## 2022-09-11 PROCEDURE — 99226 PR SBSQ OBSERVATION CARE/DAY 35 MINUTES: CPT | Performed by: INTERNAL MEDICINE

## 2022-09-11 PROCEDURE — 63710000001 INSULIN DETEMIR PER 5 UNITS: Performed by: INTERNAL MEDICINE

## 2022-09-11 RX ORDER — MIRTAZAPINE 15 MG/1
15 TABLET, FILM COATED ORAL NIGHTLY
Status: DISCONTINUED | OUTPATIENT
Start: 2022-09-11 | End: 2022-09-14 | Stop reason: HOSPADM

## 2022-09-11 RX ORDER — LOPERAMIDE HYDROCHLORIDE 2 MG/1
2 CAPSULE ORAL 4 TIMES DAILY PRN
Status: DISCONTINUED | OUTPATIENT
Start: 2022-09-11 | End: 2022-09-14 | Stop reason: HOSPADM

## 2022-09-11 RX ADMIN — IPRATROPIUM BROMIDE AND ALBUTEROL SULFATE 3 ML: .5; 3 SOLUTION RESPIRATORY (INHALATION) at 15:41

## 2022-09-11 RX ADMIN — LATANOPROST 1 DROP: 50 SOLUTION OPHTHALMIC at 22:08

## 2022-09-11 RX ADMIN — AMLODIPINE BESYLATE 2.5 MG: 2.5 TABLET ORAL at 08:14

## 2022-09-11 RX ADMIN — IPRATROPIUM BROMIDE AND ALBUTEROL SULFATE 3 ML: .5; 3 SOLUTION RESPIRATORY (INHALATION) at 08:55

## 2022-09-11 RX ADMIN — TIMOLOL MALEATE 1 DROP: 5 SOLUTION/ DROPS OPHTHALMIC at 08:14

## 2022-09-11 RX ADMIN — Medication 10 ML: at 22:07

## 2022-09-11 RX ADMIN — Medication 10 ML: at 08:15

## 2022-09-11 RX ADMIN — MIRTAZAPINE 15 MG: 15 TABLET, FILM COATED ORAL at 22:07

## 2022-09-11 RX ADMIN — CLOPIDOGREL BISULFATE 75 MG: 75 TABLET ORAL at 08:15

## 2022-09-11 RX ADMIN — PIOGLITAZONE 30 MG: 15 TABLET ORAL at 08:15

## 2022-09-11 RX ADMIN — INSULIN LISPRO 12 UNITS: 100 INJECTION, SOLUTION INTRAVENOUS; SUBCUTANEOUS at 17:13

## 2022-09-11 RX ADMIN — IPRATROPIUM BROMIDE AND ALBUTEROL SULFATE 3 ML: .5; 3 SOLUTION RESPIRATORY (INHALATION) at 19:18

## 2022-09-11 RX ADMIN — GUAIFENESIN AND DEXTROMETHORPHAN 10 ML: 100; 10 SYRUP ORAL at 08:15

## 2022-09-11 RX ADMIN — FINASTERIDE 5 MG: 5 TABLET, FILM COATED ORAL at 08:15

## 2022-09-11 RX ADMIN — INSULIN DETEMIR 50 UNITS: 100 INJECTION, SOLUTION SUBCUTANEOUS at 22:09

## 2022-09-11 RX ADMIN — ROSUVASTATIN CALCIUM 10 MG: 10 TABLET, COATED ORAL at 22:07

## 2022-09-11 RX ADMIN — ASPIRIN 81 MG: 81 TABLET, COATED ORAL at 08:15

## 2022-09-11 RX ADMIN — METOPROLOL SUCCINATE 12.5 MG: 25 TABLET, EXTENDED RELEASE ORAL at 08:14

## 2022-09-11 RX ADMIN — PANTOPRAZOLE SODIUM 40 MG: 40 TABLET, DELAYED RELEASE ORAL at 05:58

## 2022-09-11 RX ADMIN — CETIRIZINE HYDROCHLORIDE 10 MG: 10 TABLET, FILM COATED ORAL at 08:15

## 2022-09-11 RX ADMIN — IPRATROPIUM BROMIDE AND ALBUTEROL SULFATE 3 ML: .5; 3 SOLUTION RESPIRATORY (INHALATION) at 03:17

## 2022-09-11 RX ADMIN — TIMOLOL MALEATE 1 DROP: 5 SOLUTION/ DROPS OPHTHALMIC at 22:08

## 2022-09-11 RX ADMIN — INSULIN DETEMIR 50 UNITS: 100 INJECTION, SOLUTION SUBCUTANEOUS at 08:14

## 2022-09-11 RX ADMIN — IPRATROPIUM BROMIDE AND ALBUTEROL SULFATE 3 ML: .5; 3 SOLUTION RESPIRATORY (INHALATION) at 11:35

## 2022-09-11 RX ADMIN — LEVOFLOXACIN 250 MG: 500 TABLET, FILM COATED ORAL at 08:14

## 2022-09-11 NOTE — PROGRESS NOTES
Afternoon after he returned from [facility].    [facility]  PROGRESS NOTE    Patient Name: Pro Pal  : 1940  MRN: [id number]    Date of Admission: 2022  Primary Care Physician: Leonard Silva MBBS    Subjective   Subjective     CC:  Follow-up for shortness of breath and HHS    HPI:  I have seen and evaluated the patient this morning.  Appears comfortable in bed.  Complaining of some cough but reported that the antitussive medication started yesterday is helping some.  Also complaining of some diarrhea.    Still, the patient is resistant to discharge and is feeling that his living environment at home is not safe for him to go but seems to be agreeable to to think about going home tomorrow.    ROS:  General : no fevers, chills  CVS: No chest pain, palpitations  Respiratory: No cough, dyspnea  GI: No N/V/D, abd pain  10 point review of systems is negative except for what is mentioned in HPI     Objective   Objective     Vital Signs:   Temp:  [97.8 °F (36.6 °C)-98.4 °F (36.9 °C)] 98.3 °F (36.8 °C)  Heart Rate:  [60-80] 60  Resp:  [16-20] 16  BP: (121-145)/(61-76) 138/71  Flow (L/min):  [1.5] 1.5     Physical Exam:  General: Chronically ill looking.  Conversant and pleasant.  Head: Atraumatic and normocephalic, without obvious abnormality  Eyes:   Conjunctivae and sclerae normal, no Icterus. No pallor  Ears:  Ears appear intact with no abnormalities noted  Throat: No oral lesions, no thrush, oral mucosa moist  Neck: Supple, trachea midline, no thyromegaly  Back:   No kyphoscoliosis present. No tenderness to palpation,   no sacral edema  Lungs: Clear to auscultation bilaterally, equal air entry, no wheezing or crackles  Heart:  Normal S1 and S2, no murmur, no gallop, No JVD, no lower extremity swelling  Abdomen:  Soft, suprapubic tenderness, no organomegaly, normal bowel sounds  Normal bowel sounds, no masses, no organomegaly. Soft, nontender, nondistended, no  guarding, no rebound tenderness.  Extremities: No gross abnormalities, no clubbing, pulses palpable and equal bilaterally  Skin: No bleeding, bruising or rash, normal skin turgor and elasticity  Neurologic: Cranial nerves appear intact with no evidence of facial asymmetry, normal motor and sensory functions in all 4 extremities  Psych: Alert and oriented x 3, normal mood    Results Reviewed:  LAB RESULTS:      Lab 09/11/22  0535 09/11/22  0400 09/11/22  0024 09/10/22  1938 09/10/22  1710 09/10/22  1223 09/10/22  0835 09/09/22  0740 09/09/22  0553 09/08/22  1421 09/08/22  0729 09/07/22  1427 09/07/22  0508 09/06/22  0004 09/05/22  1911   WBC  --  11.59*  --   --   --   --  14.13*  --  14.19*  --  16.05*  --  16.84*   < > 22.80*   HEMOGLOBIN 12.3* 11.9*  11.9* 11.9* 12.0* 11.2*   < > 12.4*   < > 12.1*   < > 12.2*   < > 12.0*   < > 12.3*   HEMATOCRIT 33.6* 34.9*  34.9* 34.6* 35.0* 30.2*   < > 37.1*   < > 37.2*   < > 36.1*   < > 32.7*   < > 35.7*   PLATELETS  --  521*  --   --   --   --  479*  --  448  --  486*  --  396   < > 374   NEUTROS ABS  --  7.24*  --   --   --   --  9.00*  --  9.27*  --  11.10*  --  12.59*   < > 19.05*   IMMATURE GRANS (ABS)  --  0.21*  --   --   --   --  0.31*  --  0.39*  --  0.45*  --  0.36*   < > 0.29*   LYMPHS ABS  --  2.56  --   --   --   --  2.91  --  2.65  --  2.47  --  2.08   < > 1.65   MONOS ABS  --  0.83  --   --   --   --  1.03*  --  0.96*  --  1.23*  --  1.40*   < > 1.72*   EOS ABS  --  0.70*  --   --   --   --  0.81*  --  0.83*  --  0.73*  --  0.37   < > 0.04   MCV  --  92.1  --   --   --   --  87.7  --  89.4  --  90.9  --  92.4   < > 89.3   PROCALCITONIN  --   --   --   --   --   --   --   --   --   --   --   --   --   --  1.32*   LACTATE  --   --   --   --   --   --   --   --   --   --   --   --   --   --  1.9    < > = values in this interval not displayed.         Lab 09/11/22  0400 09/10/22  0835 09/09/22  0553 09/08/22  0729 09/07/22  1427 09/06/22  1039 09/06/22  0341  09/06/22  0004 09/05/22 2227 09/05/22 1911   SODIUM 141 138 136 135* 137 135* 132* 127*  --  122*   POTASSIUM 4.7 4.4 4.6 4.6 5.2 4.8 4.5 4.8  --  5.3*   CHLORIDE 105 100 105 102 103 100 97* 92*  --  84*   CO2 23.0 25.0 16.0* 20.0* 17.0* 22.0 20.0* 18.0*  --  19.0*   ANION GAP 13.0 13.0 15.0 13.0 17.0* 13.0 15.0 17.0*  --  19.0*   BUN 66* 67* 71* 67* 71* 77* 79* 83*  --  83*   CREATININE 2.74* 2.83* 2.83* 2.64* 2.91* 2.98* 3.04* 3.05*  --  3.28*   EGFR 22.3* 21.4* 21.4* 23.3* 20.7* 20.1* 19.7* 19.6*  --  18.0*   GLUCOSE 102* 150* 150* 164* 158* 169* 257* 470*   < > 664*   CALCIUM 8.9 9.3 9.2 9.3 8.5* 8.9 9.2 8.9  --  9.8   MAGNESIUM  --   --   --  1.8 1.9 2.0 2.1 2.7*   < >  --    PHOSPHORUS  --   --   --  3.9 3.5 2.5 3.1 3.5   < >  --    HEMOGLOBIN A1C  --   --   --   --   --   --   --   --   --  11.80*    < > = values in this interval not displayed.         Lab 09/08/22  0729 09/07/22  1427 09/05/22 1911   TOTAL PROTEIN 8.0 6.3 8.7*   ALBUMIN 3.30* 2.90* 3.50   GLOBULIN 4.7 3.4 5.2   ALT (SGPT) 68* 75* 29   AST (SGOT) 43* 75* 35   BILIRUBIN 0.3 0.2 0.4   ALK PHOS 154* 140* 130*         Lab 09/05/22 1911   PROBNP 1,048.0   TROPONIN T <0.010             Lab 09/06/22  0341 09/06/22  0004   ABO TYPING A A   RH TYPING Positive Positive   ANTIBODY SCREEN  --  Negative         Lab 09/05/22 2101   PH, ARTERIAL 7.384   PCO2, ARTERIAL 34.3*   PO2 ART 76.4*   FIO2 21   HCO3 ART 20.4   BASE EXCESS ART -4.0*   CARBOXYHEMOGLOBIN 1.0     Brief Urine Lab Results  (Last result in the past 365 days)      Color   Clarity   Blood   Leuk Est   Nitrite   Protein   CREAT   Urine HCG        09/09/22 1722             69.4         09/09/22 1722             66.6               Microbiology Results Abnormal     Procedure Component Value - Date/Time    Blood Culture - Blood, Wrist, Right [[id number]]  (Normal) Collected: 09/05/22 2015    Lab Status: Final result Specimen: Blood from Wrist, Right Updated: 09/10/22 2102     Blood Culture No  growth at 5 days    Blood Culture - Blood, Arm, Left [[id number]]  (Normal) Collected: 09/05/22 2020    Lab Status: Final result Specimen: Blood from Arm, Left Updated: 09/10/22 2102     Blood Culture No growth at 5 days    S. Pneumo Ag Urine or CSF - Urine, Urine, Clean Catch [[id number]]  (Normal) Collected: 09/06/22 0159    Lab Status: Final result Specimen: Urine, Clean Catch Updated: 09/06/22 0929     Strep Pneumo Ag Negative    Legionella Antigen, Urine - Urine, Urine, Clean Catch [[id number]]  (Normal) Collected: 09/06/22 0159    Lab Status: Final result Specimen: Urine, Clean Catch Updated: 09/06/22 0929     LEGIONELLA ANTIGEN, URINE Negative    MRSA Screen, PCR (Inpatient) - Swab, Nares [[id number]]  (Normal) Collected: 09/06/22 0619    Lab Status: Final result Specimen: Swab from Nares Updated: 09/06/22 0813     MRSA PCR Negative    Narrative:      The negative predictive value of this diagnostic test is high and should only be used to consider de-escalating anti-MRSA therapy. A positive result may indicate colonization with MRSA and must be correlated clinically.  MRSA Negative    Respiratory Panel PCR w/COVID-19(SARS-CoV-2) TREASURE/MAIKOL/ELTON/PAD/COR/MAD/KISHORE In-House, NP Swab in UTM/VTM, 3-4 HR TAT - Swab, Nasopharynx [[id number]]  (Normal) Collected: 09/06/22 0619    Lab Status: Final result Specimen: Swab from Nasopharynx Updated: 09/06/22 0750     ADENOVIRUS, PCR Not Detected     Coronavirus 229E Not Detected     Coronavirus HKU1 Not Detected     Coronavirus NL63 Not Detected     Coronavirus OC43 Not Detected     COVID19 Not Detected     Human Metapneumovirus Not Detected     Human Rhinovirus/Enterovirus Not Detected     Influenza A PCR Not Detected     Influenza B PCR Not Detected     Parainfluenza Virus 1 Not Detected     Parainfluenza Virus 2 Not Detected     Parainfluenza Virus 3 Not Detected     Parainfluenza Virus 4 Not Detected     RSV, PCR Not Detected     Bordetella pertussis pcr Not Detected      Bordetella parapertussis PCR Not Detected     Chlamydophila pneumoniae PCR Not Detected     Mycoplasma pneumo by PCR Not Detected    Narrative:      In the setting of a positive respiratory panel with a viral infection PLUS a negative procalcitonin without other underlying concern for bacterial infection, consider observing off antibiotics or discontinuation of antibiotics and continue supportive care. If the respiratory panel is positive for atypical bacterial infection (Bordetella pertussis, Chlamydophila pneumoniae, or Mycoplasma pneumoniae), consider antibiotic de-escalation to target atypical bacterial infection.    COVID PRE-OP / PRE-PROCEDURE SCREENING ORDER (NO ISOLATION) - Swab, Nasopharynx [[id number]]  (Normal) Collected: 09/1940    Lab Status: Final result Specimen: Swab from Nasopharynx Updated: 09/05/22 2011    Narrative:      The following orders were created for panel order COVID PRE-OP / PRE-PROCEDURE SCREENING ORDER (NO ISOLATION) - Swab, Nasopharynx.  Procedure                               Abnormality         Status                     ---------                               -----------         ------                     COVID-19 and FLU A/B PCR...[[id number]]  Normal              Final result                 Please view results for these tests on the individual orders.    COVID-19 and FLU A/B PCR - Swab, Nasopharynx [[id number]]  (Normal) Collected: 09/1940    Lab Status: Final result Specimen: Swab from Nasopharynx Updated: 09/05/22 2011     COVID19 Not Detected     Influenza A PCR Not Detected     Influenza B PCR Not Detected    Narrative:      Fact sheet for providers: https://www.fda.gov/media/324386/download    Fact sheet for patients: https://www.fda.gov/media/921466/download    Test performed by PCR.        XR Chest 1 View    Result Date: 9/10/2022  DATE OF EXAM: 9/10/2022 6:59 PM  PROCEDURE: XR CHEST 1 VW-  INDICATIONS: follow up; R06.00-Dyspnea, unspecified; R06.89-Other  abnormalities of breathing; D72.825-Bandemia; N28.9-Disorder of kidney and ureter, unspecified; R05.9-Cough, unspecified; J44.9-Chronic obstructive pulmonary disease, unspecified; R73.9-Hyperglycemia, unspecified; R13.10-Dysphagia, unspecified; R53.1-Weakness; R13.19-Other dysphagia; N17.9-Acute kidney failure, unspecified; N18.30-Chronic kidne  COMPARISON: 9/5/2022  TECHNIQUE: Single radiographic AP view of the chest was obtained.  FINDINGS: The heart mediastinum and pulmonary vasculature appear within normal limits. There is trace remaining bibasilar discoid atelectasis. Lungs are clear elsewhere. No new pulmonary parenchymal disease effusion or pneumothorax is seen.      Impression: Minimal remaining bibasilar discoid atelectasis.  This report was finalized on 9/10/2022 10:33 PM by Dr. Amarjit Bui MD.        Results for orders placed during the hospital encounter of 05/24/21    Adult Transthoracic Echo Complete W/ Cont if Necessary Per Protocol    Interpretation Summary  · Estimated left ventricular EF = 55% Left ventricular systolic function is normal.  · Left ventricular diastolic function is consistent with (grade II w/high LAP) pseudonormalization.  · Mild mitral valve regurgitation is present.  · Mild tricuspid valve regurgitation is present.  · Calculated right ventricular systolic pressure from tricuspid regurgitation is 39 mmHg.    I have reviewed the medications:  Scheduled Meds:amLODIPine, 2.5 mg, Oral, Daily  aspirin, 81 mg, Oral, Daily  cetirizine, 10 mg, Oral, Daily  clopidogrel, 75 mg, Oral, Daily  finasteride, 5 mg, Oral, Daily  insulin detemir, 50 Units, Subcutaneous, Q12H  insulin lispro, 0-24 Units, Subcutaneous, TID AC  ipratropium-albuterol, 3 mL, Nebulization, Q4H - RT  latanoprost, 1 drop, Both Eyes, Nightly  levoFLOXacin, 250 mg, Oral, Q24H  metoprolol succinate XL, 12.5 mg, Oral, Q24H  pantoprazole, 40 mg, Oral, Q AM  pioglitazone, 30 mg, Oral, Daily  rosuvastatin, 10 mg, Oral,  Nightly  sodium chloride, 10 mL, Intravenous, Q12H  timolol, 1 drop, Both Eyes, BID      Continuous Infusions:sodium chloride, 50 mL/hr, Last Rate: 50 mL/hr (09/11/22 0559)      PRN Meds:.•  acetaminophen **OR** acetaminophen **OR** acetaminophen  •  albuterol  •  dextrose  •  dextrose  •  dextrose  •  dextrose  •  famotidine  •  glucagon (human recombinant)  •  glucagon (human recombinant)  •  guaiFENesin-dextromethorphan    Assessment & Plan   Assessment & Plan     Active Hospital Problems    Diagnosis  POA   • **Odynophagia [R13.10]  Unknown   • Self-catheterizes urinary bladder [Z78.9]  Unknown   • Acute UTI (urinary tract infection) [N39.0]  Unknown   • Sepsis (HCC) [A41.9]  Yes   • Type 2 diabetes mellitus with hyperglycemia (HCC) [E11.65]  Yes   • Pneumonia [J18.9]  Yes   • CAD (coronary artery disease) [I25.10]  Yes   • Hyperkalemia [E87.5]  Yes   • Acute renal failure superimposed on stage 3 chronic kidney disease (HCC) [N17.9, N18.30]  Yes   • Dyspnea and respiratory abnormalities [R06.00, R06.89]  Yes   • COPD [J44.9]  Yes   • Dyslipidemia [E78.5]  Yes   • HTN [I10]  Yes   • AGATHA on CPAP [G47.33, Z99.89]  Not Applicable      Resolved Hospital Problems   No resolved problems to display.     Brief Hospital Course to date:  Pro Pal is a 82 y.o. male with past medical history of COPD, type 2 diabetes, obstructive sleep apnea on CPAP, coronary artery disease, essential hypertension, BPH, dyslipidemia, complete heart block status post pacemaker, who presented to the hospital with increasing fatigue, worsening shortness of breath and productive cough.    Assessment and plan:  Mild JONATHAN on CKD stage III     Hyperkalemia, resolved  · Likely secondary to dehydration volume depletion in the context of UTI  · Baseline creatinine 2.  Creatinine increased to 2.8.  Currently receiving IV fluids per nephrology recommendations.  Continue to monitor kidney functions  · Appreciate help from Dr. Corona/  nephrology    Severe sepsis 2/2 UTI, improved  Leukocytosis, improved  · CT scan chest essentially ruled out pneumonia and patient with minimal respiratory symptoms  · Urine analysis is positive for UTI and urine culture is pending.  Patient self cath at home which might be the cause of his UTI  · MRSA swab negative, vancomycin discontinued   · Initially received Zosyn that was eventually changed to p.o. Levaquin based on urine culture showing pansensitive E. coli     HHS, resolved  Poorly controlled type 2 diabetes, A1c 11.8%  Medication noncompliance  · Did not take his insulin for 6 days before presenting to the hospital  · Has been drinking plenty of Gatorade.  Blood sugar was markedly evaded on admission.  Started on Glucomander HHS protocol  · Average insulin requirement while hospitalized is 5 to 6 units/h over the last 24 hours (around 100 to 120 units daily).  He is taking insulin Humulin R U5 100, 260 units at home (130 5 in the AM and 120 5 in the PM)  · Insulin drip discontinued 9/6/2022.  Started on insulin Levemir 50 units twice daily with sliding scale insulin and seems to be controlling his blood sugar   · Counseled about the importance to comply with his insulin     Dysphagia to solid  · Evaluated by speech team and felt that his dysphagia is esophageal in origin  · GI performed EGD 0 9/8/2022 that showed small hiatal hernia, GERD and reflux esophagitis.  No evidence of esophageal stricture.  Patient did not undergo dilation today because he was on Plavix.  GI recommended to initiate PPI and monitor for symptoms.  If symptoms did not improve, he might need esophageal dilation of blood thinners     Coronary artery disease  · Status post PCI in May 2021  · Continue ASA, plavix, statin       Hx of complete heart block   · S/p PPM     AGATHA   · CPAP at HS      Discharge planning:  · After visiting discharge planning with the patient over the last few days.  He does not feel that his living environment at  home is safe.  Has been declining discharged over the last few days.  Case management consulted and patient declined assistant facility placement as well as long-term care placement.  · Per my discussion with him today 9/11/2022, he seems to be agreeable to go home tomorrow      Expected Discharge Location and Transportation: Home   Expected Discharge Date: 9/12/2022    DVT prophylaxis:  No DVT prophylaxis order currently exists.     AM-PAC 6 Clicks Score (PT): 19 (09/10/22 2000)    CODE STATUS:   Code Status and Medical Interventions:   Ordered at: 09/05/22 3861     Level Of Support Discussed With:    Patient     Code Status (Patient has no pulse and is not breathing):    CPR (Attempt to Resuscitate)     Medical Interventions (Patient has pulse or is breathing):    Full Support       Liborio Allen MD  09/11/22

## 2022-09-11 NOTE — PROGRESS NOTES
" LOS: 1 day   Patient Care Team:  Leonard Silva MBBS as PCP - General (Family Medicine)    Chief Complaint: JONATHAN on CKD stage III    Subjective     Subjective:  Symptoms:  Stable.  No shortness of breath, chest pain, chest pressure or anxiety.    Diet:  Poor intake.  No nausea or vomiting.    Pain:  He reports no pain.        History taken from: patient    Objective     Vital Sign Min/Max for last 24 hours  Temp  Min: 97.6 °F (36.4 °C)  Max: 98.3 °F (36.8 °C)   BP  Min: 116/48  Max: 161/66   Pulse  Min: 60  Max: 80   Resp  Min: 16  Max: 20   SpO2  Min: 90 %  Max: 99 %   Flow (L/min)  Min: 1.5  Max: 1.5   No data recorded     Flowsheet Rows    Flowsheet Row First Filed Value   Admission Height 185.4 cm (73\") Documented at 09/05/2022 1907   Admission Weight 102 kg (225 lb) Documented at 09/05/2022 1907          I/O this shift:  In: 1411 [P.O.:480; I.V.:931]  Out: 100 [Urine:100]  I/O last 3 completed shifts:  In: 4557 [P.O.:1256; I.V.:3301]  Out: 525 [Urine:525]    Objective:  General Appearance:  Comfortable.    Vital signs: (most recent): Blood pressure 116/48, pulse 79, temperature 97.6 °F (36.4 °C), temperature source Oral, resp. rate 18, height 185.4 cm (73\"), weight 96.1 kg (211 lb 14.4 oz), SpO2 90 %.  Vital signs are normal.    Output: Producing urine.    HEENT: Normal HEENT exam.    Lungs:  Normal effort and normal respiratory rate.  Breath sounds clear to auscultation.  He is not in respiratory distress.  No decreased breath sounds or wheezes.    Heart: Normal rate.  Regular rhythm.  S1 normal and S2 normal.  No murmur.   Abdomen: Abdomen is soft.    Extremities: Normal range of motion.  There is no dependent edema or local swelling.    Pulses: Distal pulses are intact.    Neurological: Patient is alert and oriented to person, place and time.  Normal strength.    Pupils:  Pupils are equal, round, and reactive to light.    Skin:  Warm.              Results Review:     I reviewed the patient's new " clinical results.    WBC WBC   Date Value Ref Range Status   09/11/2022 11.59 (H) 3.40 - 10.80 10*3/mm3 Final   09/10/2022 14.13 (H) 3.40 - 10.80 10*3/mm3 Final   09/09/2022 14.19 (H) 3.40 - 10.80 10*3/mm3 Final      HGB Hemoglobin   Date Value Ref Range Status   09/11/2022 12.3 (L) 13.0 - 17.7 g/dL Final   09/11/2022 11.9 (L) 13.0 - 17.7 g/dL Final   09/11/2022 11.9 (L) 13.0 - 17.7 g/dL Final   09/11/2022 11.9 (L) 13.0 - 17.7 g/dL Final   09/10/2022 12.0 (L) 13.0 - 17.7 g/dL Final   09/10/2022 11.2 (L) 13.0 - 17.7 g/dL Final   09/10/2022 11.4 (L) 13.0 - 17.7 g/dL Final   09/10/2022 12.4 (L) 13.0 - 17.7 g/dL Final   09/09/2022 11.7 (L) 13.0 - 17.7 g/dL Final   09/09/2022 11.2 (L) 13.0 - 17.7 g/dL Final   09/09/2022 11.9 (L) 13.0 - 17.7 g/dL Final   09/09/2022 12.3 (L) 13.0 - 17.7 g/dL Final   09/09/2022 12.1 (L) 13.0 - 17.7 g/dL Final   09/08/2022 11.6 (L) 13.0 - 17.7 g/dL Final   09/08/2022 11.5 (L) 13.0 - 17.7 g/dL Final      HCT Hematocrit   Date Value Ref Range Status   09/11/2022 33.6 (L) 37.5 - 51.0 % Final   09/11/2022 34.9 (L) 37.5 - 51.0 % Final   09/11/2022 34.9 (L) 37.5 - 51.0 % Final   09/11/2022 34.6 (L) 37.5 - 51.0 % Final   09/10/2022 35.0 (L) 37.5 - 51.0 % Final   09/10/2022 30.2 (L) 37.5 - 51.0 % Final   09/10/2022 33.2 (L) 37.5 - 51.0 % Final   09/10/2022 37.1 (L) 37.5 - 51.0 % Final   09/09/2022 31.6 (L) 37.5 - 51.0 % Final   09/09/2022 34.4 (L) 37.5 - 51.0 % Final   09/09/2022 36.5 (L) 37.5 - 51.0 % Corrected     Comment:     Corrected result. Previous result was 30.4 % on 9/9/2022 at 1521 EDT.   09/09/2022 37.7 37.5 - 51.0 % Final   09/09/2022 37.2 (L) 37.5 - 51.0 % Final   09/08/2022 32.7 (L) 37.5 - 51.0 % Final   09/08/2022 33.6 (L) 37.5 - 51.0 % Final      Platlets No results found for: LABPLAT   MCV MCV   Date Value Ref Range Status   09/11/2022 92.1 79.0 - 97.0 fL Final   09/10/2022 87.7 79.0 - 97.0 fL Final   09/09/2022 89.4 79.0 - 97.0 fL Final          Sodium Sodium   Date Value Ref  Range Status   09/11/2022 141 136 - 145 mmol/L Final   09/10/2022 138 136 - 145 mmol/L Final   09/09/2022 136 136 - 145 mmol/L Final      Potassium Potassium   Date Value Ref Range Status   09/11/2022 4.7 3.5 - 5.2 mmol/L Final   09/10/2022 4.4 3.5 - 5.2 mmol/L Final   09/09/2022 4.6 3.5 - 5.2 mmol/L Final      Chloride Chloride   Date Value Ref Range Status   09/11/2022 105 98 - 107 mmol/L Final   09/10/2022 100 98 - 107 mmol/L Final   09/09/2022 105 98 - 107 mmol/L Final      CO2 CO2   Date Value Ref Range Status   09/11/2022 23.0 22.0 - 29.0 mmol/L Final   09/10/2022 25.0 22.0 - 29.0 mmol/L Final   09/09/2022 16.0 (L) 22.0 - 29.0 mmol/L Final      BUN BUN   Date Value Ref Range Status   09/11/2022 66 (H) 8 - 23 mg/dL Final   09/10/2022 67 (H) 8 - 23 mg/dL Final   09/09/2022 71 (H) 8 - 23 mg/dL Final      Creatinine Creatinine   Date Value Ref Range Status   09/11/2022 2.74 (H) 0.76 - 1.27 mg/dL Final   09/10/2022 2.83 (H) 0.76 - 1.27 mg/dL Final   09/09/2022 2.83 (H) 0.76 - 1.27 mg/dL Final      Calcium Calcium   Date Value Ref Range Status   09/11/2022 8.9 8.6 - 10.5 mg/dL Final   09/10/2022 9.3 8.6 - 10.5 mg/dL Final   09/09/2022 9.2 8.6 - 10.5 mg/dL Final      PO4 No results found for: CAPO4   Albumin No results found for: ALBUMIN   Magnesium No results found for: MG   Uric Acid No results found for: URICACID     Medication Review: yes    Assessment & Plan       Odynophagia    COPD    Dyslipidemia    AGATHA on CPAP    HTN    Sepsis (HCC)    Type 2 diabetes mellitus with hyperglycemia (HCC)    Pneumonia    CAD (coronary artery disease)    Hyperkalemia    Acute renal failure superimposed on stage 3 chronic kidney disease (HCC)    Dyspnea and respiratory abnormalities    Self-catheterizes urinary bladder    Acute UTI (urinary tract infection)      Assessment & Plan     Acute kidney injury on CKD stage III:  Baseline cr ~ 1.8mg/dl-2.0mg/dl Cr on this admission 3.2 mg Most recent 2.7 2.8mg/dl Bun 71. Etiology likely  hemodynamic injury in the setting of infection. Given hx of urinary retention requiring straight cath will order renal US result pending     CKD stage III: UA large glucose, Blood+, protein trace, wbc+. Urine microalb/cr 168.      Met acidosis: Due to JONATHAN on CKD      Urinary retention: Does straigh cath at home     Odynophagia: Underwent EGD with GI      Hyperkalemia: On admission. Improved     Recs  No significant change in renal function w IV fluids. Likely volume non responsive at this stage. D/C IV fluids. Encourage po intake. Conservative management otherwise foe now. Avoid nephrotoxic agents. Dose meds to eGFR.    Glen Corona MD  09/11/22  14:18 EDT

## 2022-09-11 NOTE — PROGRESS NOTES
"GI Daily Progress Note  Subjective:    Chief Complaint: Follow-up odynophagia    Patient resting in bed in no acute distress.  Notes he still has overall lack of appetite and intermittent pain with swallowing.  Otherwise, no new or worsening symptoms.    Objective:    /63 (BP Location: Left arm, Patient Position: Lying)   Pulse 89   Temp 98 °F (36.7 °C) (Oral)   Resp 16   Ht 185.4 cm (73\")   Wt 96.1 kg (211 lb 14.4 oz)   SpO2 95%   BMI 27.96 kg/m²     Physical Exam  Vitals and nursing note reviewed.   Constitutional:       General: He is not in acute distress.     Appearance: Normal appearance. He is normal weight. He is not ill-appearing or toxic-appearing.   HENT:      Head: Normocephalic and atraumatic.   Eyes:      General: No scleral icterus.     Extraocular Movements: Extraocular movements intact.      Conjunctiva/sclera: Conjunctivae normal.      Pupils: Pupils are equal, round, and reactive to light.   Cardiovascular:      Rate and Rhythm: Normal rate and regular rhythm.      Pulses: Normal pulses.      Heart sounds: Normal heart sounds.   Pulmonary:      Effort: Pulmonary effort is normal. No respiratory distress.      Breath sounds: Normal breath sounds.   Abdominal:      General: Abdomen is flat. Bowel sounds are normal. There is no distension.      Palpations: Abdomen is soft. There is no mass.      Tenderness: There is no abdominal tenderness. There is no guarding or rebound.      Hernia: No hernia is present.   Skin:     General: Skin is warm and dry.      Capillary Refill: Capillary refill takes less than 2 seconds.      Coloration: Skin is pale. Skin is not jaundiced.   Neurological:      General: No focal deficit present.      Mental Status: He is alert and oriented to person, place, and time.   Psychiatric:         Mood and Affect: Mood normal.         Thought Content: Thought content normal.         Judgment: Judgment normal.         Lab  I have personally reviewed most recent cardiac " tracings, lab results and radiology images and interpretations and agree with findings.    Lab Results   Component Value Date    WBC 11.59 (H) 09/11/2022    HGB 10.9 (L) 09/11/2022    HGB 12.3 (L) 09/11/2022    HGB 11.9 (L) 09/11/2022    HGB 11.9 (L) 09/11/2022    MCV 92.1 09/11/2022     (H) 09/11/2022       Lab Results   Component Value Date    GLUCOSE 102 (H) 09/11/2022    BUN 66 (H) 09/11/2022    CREATININE 2.74 (H) 09/11/2022    EGFRIFNONA 31 (L) 05/24/2022    EGFRIFAFRI 37 (L) 05/24/2022    BCR 24.1 09/11/2022     09/11/2022    K 4.7 09/11/2022    CO2 23.0 09/11/2022    CALCIUM 8.9 09/11/2022    ALBUMIN 3.30 (L) 09/08/2022    ALKPHOS 154 (H) 09/08/2022    BILITOT 0.3 09/08/2022    ALT 68 (H) 09/08/2022    AST 43 (H) 09/08/2022       Assessment:      Odynophagia    COPD    Dyslipidemia    AGATHA on CPAP    HTN    Sepsis (HCC)    Type 2 diabetes mellitus with hyperglycemia (HCC)    Pneumonia    CAD (coronary artery disease)    Hyperkalemia    Acute renal failure superimposed on stage 3 chronic kidney disease (HCC)    Dyspnea and respiratory abnormalities    Self-catheterizes urinary bladder    Acute UTI (urinary tract infection)    1.  Odynophagia  2.  Sepsis secondary to urinary tract infection  3.  Poorly controlled type 2 diabetes mellitus  4.  Chronic kidney disease    Plan:  Patient with ongoing odynophagia and anorexia.  >>> Plan outpatient EGD with dilation in the next 2 weeks.  Outpatient GI office to arrange.  >>> We will add Remeron 15 mg nightly for sleep and appetite stimulation.    HERMELINDA Claros  09/11/22  19:13 EDT

## 2022-09-11 NOTE — PLAN OF CARE
Goal Outcome Evaluation:           Progress: improving  Outcome Evaluation: Patient up with stand-by assist, self cath himself, (see I&O). Patient reports that he has had diarrhea (2 stools yesterday) only one stool today. Cough has improved today. IV fluids discontinued per nephrology. Possible discharge to home tomorrow.

## 2022-09-12 ENCOUNTER — APPOINTMENT (OUTPATIENT)
Dept: ULTRASOUND IMAGING | Facility: HOSPITAL | Age: 82
End: 2022-09-12

## 2022-09-12 ENCOUNTER — HOME HEALTH ADMISSION (OUTPATIENT)
Dept: HOME HEALTH SERVICES | Facility: HOME HEALTHCARE | Age: 82
End: 2022-09-12

## 2022-09-12 LAB
ANION GAP SERPL CALCULATED.3IONS-SCNC: 15 MMOL/L (ref 5–15)
BASOPHILS # BLD AUTO: 0.05 10*3/MM3 (ref 0–0.2)
BASOPHILS NFR BLD AUTO: 0.4 % (ref 0–1.5)
BUN SERPL-MCNC: 62 MG/DL (ref 8–23)
BUN/CREAT SERPL: 23.8 (ref 7–25)
CALCIUM SPEC-SCNC: 9.3 MG/DL (ref 8.6–10.5)
CHLORIDE SERPL-SCNC: 104 MMOL/L (ref 98–107)
CO2 SERPL-SCNC: 24 MMOL/L (ref 22–29)
CREAT SERPL-MCNC: 2.61 MG/DL (ref 0.76–1.27)
DEPRECATED RDW RBC AUTO: 43.9 FL (ref 37–54)
EGFRCR SERPLBLD CKD-EPI 2021: 23.6 ML/MIN/1.73
EOSINOPHIL # BLD AUTO: 0.85 10*3/MM3 (ref 0–0.4)
EOSINOPHIL NFR BLD AUTO: 6.9 % (ref 0.3–6.2)
ERYTHROCYTE [DISTWIDTH] IN BLOOD BY AUTOMATED COUNT: 13.2 % (ref 12.3–15.4)
GLUCOSE BLDC GLUCOMTR-MCNC: 196 MG/DL (ref 70–130)
GLUCOSE BLDC GLUCOMTR-MCNC: 268 MG/DL (ref 70–130)
GLUCOSE BLDC GLUCOMTR-MCNC: 270 MG/DL (ref 70–130)
GLUCOSE BLDC GLUCOMTR-MCNC: 63 MG/DL (ref 70–130)
GLUCOSE BLDC GLUCOMTR-MCNC: 87 MG/DL (ref 70–130)
GLUCOSE SERPL-MCNC: 64 MG/DL (ref 65–99)
HCT VFR BLD AUTO: 31.2 % (ref 37.5–51)
HCT VFR BLD AUTO: 31.8 % (ref 37.5–51)
HCT VFR BLD AUTO: 33.5 % (ref 37.5–51)
HCT VFR BLD AUTO: 34.4 % (ref 37.5–51)
HCT VFR BLD AUTO: 35.3 % (ref 37.5–51)
HCT VFR BLD AUTO: 37.4 % (ref 37.5–51)
HGB BLD-MCNC: 11.1 G/DL (ref 13–17.7)
HGB BLD-MCNC: 11.2 G/DL (ref 13–17.7)
HGB BLD-MCNC: 11.5 G/DL (ref 13–17.7)
HGB BLD-MCNC: 11.6 G/DL (ref 13–17.7)
HGB BLD-MCNC: 11.7 G/DL (ref 13–17.7)
HGB BLD-MCNC: 12.4 G/DL (ref 13–17.7)
IMM GRANULOCYTES # BLD AUTO: 0.16 10*3/MM3 (ref 0–0.05)
IMM GRANULOCYTES NFR BLD AUTO: 1.3 % (ref 0–0.5)
LYMPHOCYTES # BLD AUTO: 2.73 10*3/MM3 (ref 0.7–3.1)
LYMPHOCYTES NFR BLD AUTO: 22.2 % (ref 19.6–45.3)
MCH RBC QN AUTO: 33.6 PG (ref 26.6–33)
MCHC RBC AUTO-ENTMCNC: 36 G/DL (ref 31.5–35.7)
MCV RBC AUTO: 93.2 FL (ref 79–97)
MONOCYTES # BLD AUTO: 0.97 10*3/MM3 (ref 0.1–0.9)
MONOCYTES NFR BLD AUTO: 7.9 % (ref 5–12)
NEUTROPHILS NFR BLD AUTO: 61.3 % (ref 42.7–76)
NEUTROPHILS NFR BLD AUTO: 7.52 10*3/MM3 (ref 1.7–7)
NRBC BLD AUTO-RTO: 0 /100 WBC (ref 0–0.2)
PLATELET # BLD AUTO: 536 10*3/MM3 (ref 140–450)
PMV BLD AUTO: 10 FL (ref 6–12)
POTASSIUM SERPL-SCNC: 5.3 MMOL/L (ref 3.5–5.2)
RBC # BLD AUTO: 3.69 10*6/MM3 (ref 4.14–5.8)
SODIUM SERPL-SCNC: 143 MMOL/L (ref 136–145)
WBC NRBC COR # BLD: 12.28 10*3/MM3 (ref 3.4–10.8)

## 2022-09-12 PROCEDURE — 63710000001 ROSUVASTATIN 10 MG TABLET: Performed by: INTERNAL MEDICINE

## 2022-09-12 PROCEDURE — 63710000001 INSULIN LISPRO (HUMAN) PER 5 UNITS: Performed by: INTERNAL MEDICINE

## 2022-09-12 PROCEDURE — 99226 PR SBSQ OBSERVATION CARE/DAY 35 MINUTES: CPT | Performed by: INTERNAL MEDICINE

## 2022-09-12 PROCEDURE — 85018 HEMOGLOBIN: CPT | Performed by: INTERNAL MEDICINE

## 2022-09-12 PROCEDURE — G0378 HOSPITAL OBSERVATION PER HR: HCPCS

## 2022-09-12 PROCEDURE — A9270 NON-COVERED ITEM OR SERVICE: HCPCS | Performed by: INTERNAL MEDICINE

## 2022-09-12 PROCEDURE — 85025 COMPLETE CBC W/AUTO DIFF WBC: CPT | Performed by: INTERNAL MEDICINE

## 2022-09-12 PROCEDURE — 99231 SBSQ HOSP IP/OBS SF/LOW 25: CPT | Performed by: PHYSICIAN ASSISTANT

## 2022-09-12 PROCEDURE — 63710000001 MIRTAZAPINE 15 MG TABLET: Performed by: NURSE PRACTITIONER

## 2022-09-12 PROCEDURE — 63710000001 FINASTERIDE 5 MG TABLET: Performed by: INTERNAL MEDICINE

## 2022-09-12 PROCEDURE — 94799 UNLISTED PULMONARY SVC/PX: CPT

## 2022-09-12 PROCEDURE — 63710000001 ASPIRIN 81 MG TABLET DELAYED-RELEASE: Performed by: INTERNAL MEDICINE

## 2022-09-12 PROCEDURE — 94761 N-INVAS EAR/PLS OXIMETRY MLT: CPT

## 2022-09-12 PROCEDURE — 63710000001 INSULIN DETEMIR PER 5 UNITS: Performed by: INTERNAL MEDICINE

## 2022-09-12 PROCEDURE — A9270 NON-COVERED ITEM OR SERVICE: HCPCS | Performed by: NURSE PRACTITIONER

## 2022-09-12 PROCEDURE — 63710000001 CLOPIDOGREL 75 MG TABLET: Performed by: INTERNAL MEDICINE

## 2022-09-12 PROCEDURE — 63710000001 AMLODIPINE 2.5 MG TABLET: Performed by: INTERNAL MEDICINE

## 2022-09-12 PROCEDURE — 63710000001 LEVOFLOXACIN 500 MG TABLET: Performed by: INTERNAL MEDICINE

## 2022-09-12 PROCEDURE — 85014 HEMATOCRIT: CPT | Performed by: INTERNAL MEDICINE

## 2022-09-12 PROCEDURE — 63710000001 PANTOPRAZOLE 40 MG TABLET DELAYED-RELEASE: Performed by: INTERNAL MEDICINE

## 2022-09-12 PROCEDURE — 94664 DEMO&/EVAL PT USE INHALER: CPT

## 2022-09-12 PROCEDURE — 51798 US URINE CAPACITY MEASURE: CPT

## 2022-09-12 PROCEDURE — 63710000001 METOPROLOL SUCCINATE XL 25 MG TABLET SUSTAINED-RELEASE 24 HOUR: Performed by: INTERNAL MEDICINE

## 2022-09-12 PROCEDURE — 76775 US EXAM ABDO BACK WALL LIM: CPT

## 2022-09-12 PROCEDURE — 80048 BASIC METABOLIC PNL TOTAL CA: CPT | Performed by: INTERNAL MEDICINE

## 2022-09-12 PROCEDURE — 63710000001 GUAIFENESIN-DEXTROMETHORPHAN 100-10 MG/5ML SYRUP: Performed by: INTERNAL MEDICINE

## 2022-09-12 PROCEDURE — 63710000001 CETIRIZINE 10 MG TABLET: Performed by: INTERNAL MEDICINE

## 2022-09-12 PROCEDURE — 82962 GLUCOSE BLOOD TEST: CPT

## 2022-09-12 RX ORDER — INSULIN HUMAN 500 [IU]/ML
60 INJECTION, SOLUTION SUBCUTANEOUS 2 TIMES DAILY WITH MEALS
Qty: 7.2 ML | Refills: 0
Start: 2022-09-12 | End: 2022-10-12

## 2022-09-12 RX ORDER — GUAIFENESIN/DEXTROMETHORPHAN 100-10MG/5
10 SYRUP ORAL EVERY 4 HOURS PRN
Qty: 237 ML | Refills: 0 | Status: SHIPPED | OUTPATIENT
Start: 2022-09-12 | End: 2022-09-19

## 2022-09-12 RX ORDER — PANTOPRAZOLE SODIUM 40 MG/1
40 TABLET, DELAYED RELEASE ORAL
Qty: 30 TABLET | Refills: 2 | Status: SHIPPED | OUTPATIENT
Start: 2022-09-13 | End: 2022-10-13

## 2022-09-12 RX ORDER — IPRATROPIUM BROMIDE AND ALBUTEROL SULFATE 2.5; .5 MG/3ML; MG/3ML
3 SOLUTION RESPIRATORY (INHALATION) EVERY 4 HOURS PRN
Status: DISCONTINUED | OUTPATIENT
Start: 2022-09-12 | End: 2022-09-14 | Stop reason: HOSPADM

## 2022-09-12 RX ADMIN — IPRATROPIUM BROMIDE AND ALBUTEROL SULFATE 3 ML: .5; 3 SOLUTION RESPIRATORY (INHALATION) at 07:28

## 2022-09-12 RX ADMIN — INSULIN LISPRO 4 UNITS: 100 INJECTION, SOLUTION INTRAVENOUS; SUBCUTANEOUS at 12:08

## 2022-09-12 RX ADMIN — MIRTAZAPINE 15 MG: 15 TABLET, FILM COATED ORAL at 20:08

## 2022-09-12 RX ADMIN — AMLODIPINE BESYLATE 2.5 MG: 2.5 TABLET ORAL at 08:23

## 2022-09-12 RX ADMIN — CLOPIDOGREL BISULFATE 75 MG: 75 TABLET ORAL at 08:23

## 2022-09-12 RX ADMIN — ASPIRIN 81 MG: 81 TABLET, COATED ORAL at 08:23

## 2022-09-12 RX ADMIN — IPRATROPIUM BROMIDE AND ALBUTEROL SULFATE 3 ML: .5; 3 SOLUTION RESPIRATORY (INHALATION) at 03:50

## 2022-09-12 RX ADMIN — INSULIN DETEMIR 50 UNITS: 100 INJECTION, SOLUTION SUBCUTANEOUS at 20:11

## 2022-09-12 RX ADMIN — FINASTERIDE 5 MG: 5 TABLET, FILM COATED ORAL at 08:24

## 2022-09-12 RX ADMIN — Medication 10 ML: at 08:24

## 2022-09-12 RX ADMIN — PANTOPRAZOLE SODIUM 40 MG: 40 TABLET, DELAYED RELEASE ORAL at 05:44

## 2022-09-12 RX ADMIN — ROSUVASTATIN CALCIUM 10 MG: 10 TABLET, COATED ORAL at 20:08

## 2022-09-12 RX ADMIN — TIMOLOL MALEATE 1 DROP: 5 SOLUTION/ DROPS OPHTHALMIC at 08:24

## 2022-09-12 RX ADMIN — LEVOFLOXACIN 250 MG: 500 TABLET, FILM COATED ORAL at 08:23

## 2022-09-12 RX ADMIN — CETIRIZINE HYDROCHLORIDE 10 MG: 10 TABLET, FILM COATED ORAL at 08:24

## 2022-09-12 RX ADMIN — TIMOLOL MALEATE 1 DROP: 5 SOLUTION/ DROPS OPHTHALMIC at 20:08

## 2022-09-12 RX ADMIN — LATANOPROST 1 DROP: 50 SOLUTION OPHTHALMIC at 20:08

## 2022-09-12 RX ADMIN — GUAIFENESIN AND DEXTROMETHORPHAN 10 ML: 100; 10 SYRUP ORAL at 14:43

## 2022-09-12 RX ADMIN — Medication 10 ML: at 20:08

## 2022-09-12 RX ADMIN — INSULIN LISPRO 12 UNITS: 100 INJECTION, SOLUTION INTRAVENOUS; SUBCUTANEOUS at 18:31

## 2022-09-12 RX ADMIN — METOPROLOL SUCCINATE 12.5 MG: 25 TABLET, EXTENDED RELEASE ORAL at 08:23

## 2022-09-12 NOTE — PROGRESS NOTES
" LOS: 1 day   Patient Care Team:  Leonard Silva MBBS as PCP - General (Family Medicine)    Chief Complaint: JONATHAN on CKD stage III    Subjective      Cr 2.61 from 2.8 resting comfortably in bed. No active complaints    Subjective:  Symptoms:  Stable.  No shortness of breath, chest pain, chest pressure or anxiety.    Diet:  Poor intake.  No nausea or vomiting.    Pain:  He reports no pain.        History taken from: patient    Objective     Vital Sign Min/Max for last 24 hours  Temp  Min: 97.7 °F (36.5 °C)  Max: 98.6 °F (37 °C)   BP  Min: 128/65  Max: 162/71   Pulse  Min: 71  Max: 89   Resp  Min: 14  Max: 18   SpO2  Min: 91 %  Max: 98 %   Flow (L/min)  Min: 2  Max: 3   No data recorded     Flowsheet Rows    Flowsheet Row First Filed Value   Admission Height 185.4 cm (73\") Documented at 09/05/2022 1907   Admission Weight 102 kg (225 lb) Documented at 09/05/2022 1907          I/O this shift:  In: 360 [P.O.:360]  Out: 625 [Urine:625]  I/O last 3 completed shifts:  In: 3351 [P.O.:2080; I.V.:1271]  Out: 1150 [Urine:1150]    Objective:  General Appearance:  Comfortable.    Vital signs: (most recent): Blood pressure 142/63, pulse 74, temperature 97.7 °F (36.5 °C), temperature source Oral, resp. rate 16, height 185.4 cm (73\"), weight 96.1 kg (211 lb 14.4 oz), SpO2 96 %.  Vital signs are normal.    Output: Producing urine.    HEENT: Normal HEENT exam.    Lungs:  Normal effort and normal respiratory rate.  Breath sounds clear to auscultation.  He is not in respiratory distress.  No decreased breath sounds or wheezes.    Heart: Normal rate.  Regular rhythm.  S1 normal and S2 normal.  No murmur.   Abdomen: Abdomen is soft.    Extremities: Normal range of motion.  There is no dependent edema or local swelling.    Pulses: Distal pulses are intact.    Neurological: Patient is alert and oriented to person, place and time.  Normal strength.    Pupils:  Pupils are equal, round, and reactive to light.    Skin:  Warm.  "             Results Review:     I reviewed the patient's new clinical results.    WBC WBC   Date Value Ref Range Status   09/12/2022 12.28 (H) 3.40 - 10.80 10*3/mm3 Final   09/11/2022 11.59 (H) 3.40 - 10.80 10*3/mm3 Final   09/10/2022 14.13 (H) 3.40 - 10.80 10*3/mm3 Final      HGB Hemoglobin   Date Value Ref Range Status   09/12/2022 11.6 (L) 13.0 - 17.7 g/dL Final   09/12/2022 12.4 (L) 13.0 - 17.7 g/dL Final   09/12/2022 11.1 (L) 13.0 - 17.7 g/dL Final   09/12/2022 11.2 (L) 13.0 - 17.7 g/dL Final   09/11/2022 11.0 (L) 13.0 - 17.7 g/dL Final   09/11/2022 10.9 (L) 13.0 - 17.7 g/dL Final   09/11/2022 12.3 (L) 13.0 - 17.7 g/dL Final   09/11/2022 11.9 (L) 13.0 - 17.7 g/dL Final   09/11/2022 11.9 (L) 13.0 - 17.7 g/dL Final   09/11/2022 11.9 (L) 13.0 - 17.7 g/dL Final   09/10/2022 12.0 (L) 13.0 - 17.7 g/dL Final   09/10/2022 11.2 (L) 13.0 - 17.7 g/dL Final   09/10/2022 11.4 (L) 13.0 - 17.7 g/dL Final   09/10/2022 12.4 (L) 13.0 - 17.7 g/dL Final   09/09/2022 11.7 (L) 13.0 - 17.7 g/dL Final   09/09/2022 11.2 (L) 13.0 - 17.7 g/dL Final   09/09/2022 11.9 (L) 13.0 - 17.7 g/dL Final      HCT Hematocrit   Date Value Ref Range Status   09/12/2022 35.3 (L) 37.5 - 51.0 % Final   09/12/2022 34.4 (L) 37.5 - 51.0 % Final   09/12/2022 31.8 (L) 37.5 - 51.0 % Final   09/12/2022 31.2 (L) 37.5 - 51.0 % Final   09/11/2022 33.8 (L) 37.5 - 51.0 % Final   09/11/2022 33.5 (L) 37.5 - 51.0 % Final   09/11/2022 33.6 (L) 37.5 - 51.0 % Final   09/11/2022 34.9 (L) 37.5 - 51.0 % Final   09/11/2022 34.9 (L) 37.5 - 51.0 % Final   09/11/2022 34.6 (L) 37.5 - 51.0 % Final   09/10/2022 35.0 (L) 37.5 - 51.0 % Final   09/10/2022 30.2 (L) 37.5 - 51.0 % Final   09/10/2022 33.2 (L) 37.5 - 51.0 % Final   09/10/2022 37.1 (L) 37.5 - 51.0 % Final   09/09/2022 31.6 (L) 37.5 - 51.0 % Final   09/09/2022 34.4 (L) 37.5 - 51.0 % Final   09/09/2022 36.5 (L) 37.5 - 51.0 % Corrected     Comment:     Corrected result. Previous result was 30.4 % on 9/9/2022 at 1521 EDT.       Platlets No results found for: LABPLAT   MCV MCV   Date Value Ref Range Status   09/12/2022 93.2 79.0 - 97.0 fL Final   09/11/2022 92.1 79.0 - 97.0 fL Final   09/10/2022 87.7 79.0 - 97.0 fL Final          Sodium Sodium   Date Value Ref Range Status   09/12/2022 143 136 - 145 mmol/L Final   09/11/2022 141 136 - 145 mmol/L Final   09/10/2022 138 136 - 145 mmol/L Final      Potassium Potassium   Date Value Ref Range Status   09/12/2022 5.3 (H) 3.5 - 5.2 mmol/L Final     Comment:     Slight hemolysis detected by analyzer. Results may be affected.   09/11/2022 4.7 3.5 - 5.2 mmol/L Final   09/10/2022 4.4 3.5 - 5.2 mmol/L Final      Chloride Chloride   Date Value Ref Range Status   09/12/2022 104 98 - 107 mmol/L Final   09/11/2022 105 98 - 107 mmol/L Final   09/10/2022 100 98 - 107 mmol/L Final      CO2 CO2   Date Value Ref Range Status   09/12/2022 24.0 22.0 - 29.0 mmol/L Final   09/11/2022 23.0 22.0 - 29.0 mmol/L Final   09/10/2022 25.0 22.0 - 29.0 mmol/L Final      BUN BUN   Date Value Ref Range Status   09/12/2022 62 (H) 8 - 23 mg/dL Final   09/11/2022 66 (H) 8 - 23 mg/dL Final   09/10/2022 67 (H) 8 - 23 mg/dL Final      Creatinine Creatinine   Date Value Ref Range Status   09/12/2022 2.61 (H) 0.76 - 1.27 mg/dL Final   09/11/2022 2.74 (H) 0.76 - 1.27 mg/dL Final   09/10/2022 2.83 (H) 0.76 - 1.27 mg/dL Final      Calcium Calcium   Date Value Ref Range Status   09/12/2022 9.3 8.6 - 10.5 mg/dL Final   09/11/2022 8.9 8.6 - 10.5 mg/dL Final   09/10/2022 9.3 8.6 - 10.5 mg/dL Final      PO4 No results found for: CAPO4   Albumin No results found for: ALBUMIN   Magnesium No results found for: MG   Uric Acid No results found for: URICACID     Medication Review: yes    Assessment & Plan       Odynophagia    COPD    Dyslipidemia    AGATHA on CPAP    HTN    Sepsis (HCC)    Type 2 diabetes mellitus with hyperglycemia (HCC)    Pneumonia    CAD (coronary artery disease)    Hyperkalemia    Acute renal failure superimposed on stage 3  chronic kidney disease (HCC)    Dyspnea and respiratory abnormalities    Self-catheterizes urinary bladder    Acute UTI (urinary tract infection)      Assessment & Plan     Acute kidney injury on CKD stage III:  Baseline cr ~ 1.8mg/dl-2.0mg/dl Cr on this admission 3.2 mg Most recent 2.7 2.8mg/dl Bun 71. Etiology likely hemodynamic injury in the setting of infection. Given hx of urinary retention requiring straight cath US was ordered which showed mild left sided hydro     CKD stage III: UA large glucose, Blood+, protein trace, wbc+. Urine microalb/cr 168.      Met acidosis: Due to JONATHAN on CKD      Urinary retention: Does straigh cath at home. Left mild hydro noted     Odynophagia: Underwent EGD with GI      Hyperkalemia: On admission. Improved     Recs  Lokelma 10gm x 1. Encourage po intake. Conservative management otherwise foe now. Would recommend serial bladder scan Q8hr to rule out urinary retention. If no improvement in renal function will further evaluate left hydro. Avoid nephrotoxic agents. Dose meds to eGFR. Volume non responsive JONATHAN. IV fluids stopped.    Glen Corona MD  09/12/22  12:49 EDT

## 2022-09-12 NOTE — PROGRESS NOTES
"GI Daily Progress Note  Subjective:    Chief Complaint:  Follow up dysphagia     Mr. Pal denies dysphagia nor odynophagia today.  He states he ate an egg salad sandwich for lunch and very much enjoyed it.  He does complain of ongoing cough.      Objective:    /63 (BP Location: Left arm, Patient Position: Lying)   Pulse 74   Temp 97.7 °F (36.5 °C) (Oral)   Resp 16   Ht 185.4 cm (73\")   Wt 96.1 kg (211 lb 14.4 oz)   SpO2 96%   BMI 27.96 kg/m²     Physical Exam  Constitutional:       General: He is not in acute distress.  Cardiovascular:      Rate and Rhythm: Normal rate and regular rhythm.   Pulmonary:      Effort: Pulmonary effort is normal. No respiratory distress.   Abdominal:      General: Bowel sounds are normal.      Palpations: Abdomen is soft.      Tenderness: There is no abdominal tenderness.   Neurological:      Mental Status: He is alert.         Lab  Lab Results   Component Value Date    WBC 12.28 (H) 09/12/2022    HGB 11.6 (L) 09/12/2022    HGB 12.4 (L) 09/12/2022    HGB 11.1 (L) 09/12/2022    HGB 11.2 (L) 09/12/2022    MCV 93.2 09/12/2022     (H) 09/12/2022       Lab Results   Component Value Date    GLUCOSE 64 (L) 09/12/2022    BUN 62 (H) 09/12/2022    CREATININE 2.61 (H) 09/12/2022    EGFRIFNONA 31 (L) 05/24/2022    EGFRIFAFRI 37 (L) 05/24/2022    BCR 23.8 09/12/2022     09/12/2022    K 5.3 (H) 09/12/2022    CO2 24.0 09/12/2022    CALCIUM 9.3 09/12/2022    ALBUMIN 3.30 (L) 09/08/2022    ALKPHOS 154 (H) 09/08/2022    BILITOT 0.3 09/08/2022    ALT 68 (H) 09/08/2022    AST 43 (H) 09/08/2022       Assessment:    Odynophagia, resolved.  Esophageal dysphagia, improved.    LA Grade A GERD     Plan:    Appears clinically improved     >>> Continue once daily PPI   >>> Remeron initiated yesterday for appetite stimulant.  Can consider increasing this to 30 mg qhs if continues to tolerate.    We will sign off.  Please call for questions or concerns.      Mary Gupta, " PA  09/12/22  15:05 EDT

## 2022-09-12 NOTE — CASE MANAGEMENT/SOCIAL WORK
Case Management Discharge Note      Final Note: I met with Mr. Pal at the bedside. He is being discharged home today. [facility] has been arranged to follow him with SN, PT, OT and SW. I notified Tawana of his discharge.  Mr. Pal is agreeable to this. We have also asked [employer] to enroll him in their Me[facility] For Home Program. He denies having any additional discharge needs at this time.         Selected Continued Care - Admitted Since 9/5/2022     Destination    No services have been selected for the patient.              Durable Medical Equipment    No services have been selected for the patient.              Dialysis/Infusion    No services have been selected for the patient.              Home Medical Care Coordination complete.    Service Provider Selected Services Address Phone Fax Patient Preferred     [facility]  [facility] [address] [facility] [address] [phone] --          Therapy    No services have been selected for the patient.              Community Resources    No services have been selected for the patient.              Community & DME    No services have been selected for the patient.                       Final Discharge Disposition Code: 06 - home with home health care

## 2022-09-12 NOTE — PROGRESS NOTES
Afternoon after he returned from D.    [facility] Medicine Services  PROGRESS NOTE    Patient Name: Pro Pal  : 1940  MRN: [id number]    Date of Admission: 2022  Primary Care Physician: Leonard Silva MBBS    Subjective   Subjective     CC:  Follow-up for shortness of breath and HHS    HPI:  I have seen and evaluated the patient this morning.  Feeling about the same.  Weakness is improving.  Cough and diarrhea both improved.    ROS:  General : no fevers, chills  CVS: No chest pain, palpitations  Respiratory: No cough, dyspnea  GI: No N/V/D, abd pain  10 point review of systems is negative except for what is mentioned in HPI     Objective   Objective     Vital Signs:   Temp:  [97.7 °F (36.5 °C)-98.6 °F (37 °C)] 97.7 °F (36.5 °C)  Heart Rate:  [71-89] 74  Resp:  [14-18] 16  BP: (128-162)/(63-80) 142/63  Flow (L/min):  [2-3] 3     Physical Exam:  General: Chronically ill looking.  Conversant and pleasant.  Head: Atraumatic and normocephalic, without obvious abnormality  Eyes:   Conjunctivae and sclerae normal, no Icterus. No pallor  Ears:  Ears appear intact with no abnormalities noted  Throat: No oral lesions, no thrush, oral mucosa moist  Neck: Supple, trachea midline, no thyromegaly  Back:   No kyphoscoliosis present. No tenderness to palpation,   no sacral edema  Lungs: Clear to auscultation bilaterally, equal air entry, no wheezing or crackles  Heart:  Normal S1 and S2, no murmur, no gallop, No JVD, no lower extremity swelling  Abdomen:  Soft, suprapubic tenderness, no organomegaly, normal bowel sounds  Normal bowel sounds, no masses, no organomegaly. Soft, nontender, nondistended, no guarding, no rebound tenderness.  Extremities: No gross abnormalities, no clubbing, pulses palpable and equal bilaterally  Skin: No bleeding, bruising or rash, normal skin turgor and elasticity  Neurologic: Cranial nerves appear intact with no evidence of facial asymmetry, normal  motor and sensory functions in all 4 extremities  Psych: Alert and oriented x 3, normal mood    Results Reviewed:  LAB RESULTS:      Lab 09/12/22  1045 09/12/22  0621 09/12/22  0052 09/11/22  2050 09/11/22  1723 09/11/22  0535 09/11/22  0400 09/10/22  1223 09/10/22  0835 09/09/22  0740 09/09/22  0553 09/08/22  1421 09/08/22  0729 09/06/22  0004 09/05/22  1911   WBC  --  12.28*  --   --   --   --  11.59*  --  14.13*  --  14.19*  --  16.05*   < > 22.80*   HEMOGLOBIN 11.6* 12.4* 11.1*  11.2* 11.0* 10.9*   < > 11.9*  11.9*   < > 12.4*   < > 12.1*   < > 12.2*   < > 12.3*   HEMATOCRIT 35.3* 34.4* 31.8*  31.2* 33.8* 33.5*   < > 34.9*  34.9*   < > 37.1*   < > 37.2*   < > 36.1*   < > 35.7*   PLATELETS  --  536*  --   --   --   --  521*  --  479*  --  448  --  486*   < > 374   NEUTROS ABS  --  7.52*  --   --   --   --  7.24*  --  9.00*  --  9.27*  --  11.10*   < > 19.05*   IMMATURE GRANS (ABS)  --  0.16*  --   --   --   --  0.21*  --  0.31*  --  0.39*  --  0.45*   < > 0.29*   LYMPHS ABS  --  2.73  --   --   --   --  2.56  --  2.91  --  2.65  --  2.47   < > 1.65   MONOS ABS  --  0.97*  --   --   --   --  0.83  --  1.03*  --  0.96*  --  1.23*   < > 1.72*   EOS ABS  --  0.85*  --   --   --   --  0.70*  --  0.81*  --  0.83*  --  0.73*   < > 0.04   MCV  --  93.2  --   --   --   --  92.1  --  87.7  --  89.4  --  90.9   < > 89.3   PROCALCITONIN  --   --   --   --   --   --   --   --   --   --   --   --   --   --  1.32*   LACTATE  --   --   --   --   --   --   --   --   --   --   --   --   --   --  1.9    < > = values in this interval not displayed.         Lab 09/12/22  0621 09/11/22  0400 09/10/22  0835 09/09/22  0553 09/08/22  0729 09/07/22  1427 09/06/22  1039 09/06/22  0341 09/06/22  0004 09/05/22  2227 09/05/22  1911   SODIUM 143 141 138 136 135* 137 135* 132* 127*  --  122*   POTASSIUM 5.3* 4.7 4.4 4.6 4.6 5.2 4.8 4.5 4.8  --  5.3*   CHLORIDE 104 105 100 105 102 103 100 97* 92*  --  84*   CO2 24.0 23.0 25.0 16.0* 20.0*  17.0* 22.0 20.0* 18.0*  --  19.0*   ANION GAP 15.0 13.0 13.0 15.0 13.0 17.0* 13.0 15.0 17.0*  --  19.0*   BUN 62* 66* 67* 71* 67* 71* 77* 79* 83*  --  83*   CREATININE 2.61* 2.74* 2.83* 2.83* 2.64* 2.91* 2.98* 3.04* 3.05*  --  3.28*   EGFR 23.6* 22.3* 21.4* 21.4* 23.3* 20.7* 20.1* 19.7* 19.6*  --  18.0*   GLUCOSE 64* 102* 150* 150* 164* 158* 169* 257* 470*   < > 664*   CALCIUM 9.3 8.9 9.3 9.2 9.3 8.5* 8.9 9.2 8.9  --  9.8   MAGNESIUM  --   --   --   --  1.8 1.9 2.0 2.1 2.7*   < >  --    PHOSPHORUS  --   --   --   --  3.9 3.5 2.5 3.1 3.5   < >  --    HEMOGLOBIN A1C  --   --   --   --   --   --   --   --   --   --  11.80*    < > = values in this interval not displayed.         Lab 09/08/22  0729 09/07/22  1427 09/05/22 1911   TOTAL PROTEIN 8.0 6.3 8.7*   ALBUMIN 3.30* 2.90* 3.50   GLOBULIN 4.7 3.4 5.2   ALT (SGPT) 68* 75* 29   AST (SGOT) 43* 75* 35   BILIRUBIN 0.3 0.2 0.4   ALK PHOS 154* 140* 130*         Lab 09/05/22  1911   PROBNP 1,048.0   TROPONIN T <0.010             Lab 09/06/22  0341 09/06/22  0004   ABO TYPING A A   RH TYPING Positive Positive   ANTIBODY SCREEN  --  Negative         Lab 09/05/22 2101   PH, ARTERIAL 7.384   PCO2, ARTERIAL 34.3*   PO2 ART 76.4*   FIO2 21   HCO3 ART 20.4   BASE EXCESS ART -4.0*   CARBOXYHEMOGLOBIN 1.0     Brief Urine Lab Results  (Last result in the past 365 days)      Color   Clarity   Blood   Leuk Est   Nitrite   Protein   CREAT   Urine HCG        09/09/22 1722             69.4         09/09/22 1722             66.6               Microbiology Results Abnormal     Procedure Component Value - Date/Time    Blood Culture - Blood, Wrist, Right [[id number]]  (Normal) Collected: 09/05/22 2015    Lab Status: Final result Specimen: Blood from Wrist, Right Updated: 09/10/22 2102     Blood Culture No growth at 5 days    Blood Culture - Blood, Arm, Left [[id number]]  (Normal) Collected: 09/05/22 2020    Lab Status: Final result Specimen: Blood from Arm, Left Updated: 09/10/22 2102      Blood Culture No growth at 5 days    S. Pneumo Ag Urine or CSF - Urine, Urine, Clean Catch [[id number]]  (Normal) Collected: 09/06/22 0159    Lab Status: Final result Specimen: Urine, Clean Catch Updated: 09/06/22 0929     Strep Pneumo Ag Negative    Legionella Antigen, Urine - Urine, Urine, Clean Catch [[id number]]  (Normal) Collected: 09/06/22 0159    Lab Status: Final result Specimen: Urine, Clean Catch Updated: 09/06/22 0929     LEGIONELLA ANTIGEN, URINE Negative    MRSA Screen, PCR (Inpatient) - Swab, Nares [[id number]]  (Normal) Collected: 09/06/22 0619    Lab Status: Final result Specimen: Swab from Nares Updated: 09/06/22 0813     MRSA PCR Negative    Narrative:      The negative predictive value of this diagnostic test is high and should only be used to consider de-escalating anti-MRSA therapy. A positive result may indicate colonization with MRSA and must be correlated clinically.  MRSA Negative    Respiratory Panel PCR w/COVID-19(SARS-CoV-2) TREASURE/MAIKOL/ELTON/PAD/COR/MAD/KISHORE In-House, NP Swab in UTM/VTM, 3-4 HR TAT - Swab, Nasopharynx [[id number]]  (Normal) Collected: 09/06/22 0619    Lab Status: Final result Specimen: Swab from Nasopharynx Updated: 09/06/22 0750     ADENOVIRUS, PCR Not Detected     Coronavirus 229E Not Detected     Coronavirus HKU1 Not Detected     Coronavirus NL63 Not Detected     Coronavirus OC43 Not Detected     COVID19 Not Detected     Human Metapneumovirus Not Detected     Human Rhinovirus/Enterovirus Not Detected     Influenza A PCR Not Detected     Influenza B PCR Not Detected     Parainfluenza Virus 1 Not Detected     Parainfluenza Virus 2 Not Detected     Parainfluenza Virus 3 Not Detected     Parainfluenza Virus 4 Not Detected     RSV, PCR Not Detected     Bordetella pertussis pcr Not Detected     Bordetella parapertussis PCR Not Detected     Chlamydophila pneumoniae PCR Not Detected     Mycoplasma pneumo by PCR Not Detected    Narrative:      In the setting of a positive respiratory  panel with a viral infection PLUS a negative procalcitonin without other underlying concern for bacterial infection, consider observing off antibiotics or discontinuation of antibiotics and continue supportive care. If the respiratory panel is positive for atypical bacterial infection (Bordetella pertussis, Chlamydophila pneumoniae, or Mycoplasma pneumoniae), consider antibiotic de-escalation to target atypical bacterial infection.    COVID PRE-OP / PRE-PROCEDURE SCREENING ORDER (NO ISOLATION) - Swab, Nasopharynx [[id number]]  (Normal) Collected: 09/1940    Lab Status: Final result Specimen: Swab from Nasopharynx Updated: 09/05/22 2011    Narrative:      The following orders were created for panel order COVID PRE-OP / PRE-PROCEDURE SCREENING ORDER (NO ISOLATION) - Swab, Nasopharynx.  Procedure                               Abnormality         Status                     ---------                               -----------         ------                     COVID-19 and FLU A/B PCR...[[id number]]  Normal              Final result                 Please view results for these tests on the individual orders.    COVID-19 and FLU A/B PCR - Swab, Nasopharynx [[id number]]  (Normal) Collected: 09/1940    Lab Status: Final result Specimen: Swab from Nasopharynx Updated: 09/05/22 2011     COVID19 Not Detected     Influenza A PCR Not Detected     Influenza B PCR Not Detected    Narrative:      Fact sheet for providers: https://www.fda.gov/media/963355/download    Fact sheet for patients: https://www.fda.gov/media/304887/download    Test performed by PCR.        XR Chest 1 View    Result Date: 9/10/2022  DATE OF EXAM: 9/10/2022 6:59 PM  PROCEDURE: XR CHEST 1 VW-  INDICATIONS: follow up; R06.00-Dyspnea, unspecified; R06.89-Other abnormalities of breathing; D72.825-Bandemia; N28.9-Disorder of kidney and ureter, unspecified; R05.9-Cough, unspecified; J44.9-Chronic obstructive pulmonary disease, unspecified;  R73.9-Hyperglycemia, unspecified; R13.10-Dysphagia, unspecified; R53.1-Weakness; R13.19-Other dysphagia; N17.9-Acute kidney failure, unspecified; N18.30-Chronic kidne  COMPARISON: 9/5/2022  TECHNIQUE: Single radiographic AP view of the chest was obtained.  FINDINGS: The heart mediastinum and pulmonary vasculature appear within normal limits. There is trace remaining bibasilar discoid atelectasis. Lungs are clear elsewhere. No new pulmonary parenchymal disease effusion or pneumothorax is seen.      Impression: Minimal remaining bibasilar discoid atelectasis.  This report was finalized on 9/10/2022 10:33 PM by Dr. Amarjit Bui MD.        Results for orders placed during the hospital encounter of 05/24/21    Adult Transthoracic Echo Complete W/ Cont if Necessary Per Protocol    Interpretation Summary  · Estimated left ventricular EF = 55% Left ventricular systolic function is normal.  · Left ventricular diastolic function is consistent with (grade II w/high LAP) pseudonormalization.  · Mild mitral valve regurgitation is present.  · Mild tricuspid valve regurgitation is present.  · Calculated right ventricular systolic pressure from tricuspid regurgitation is 39 mmHg.    I have reviewed the medications:  Scheduled Meds:amLODIPine, 2.5 mg, Oral, Daily  aspirin, 81 mg, Oral, Daily  cetirizine, 10 mg, Oral, Daily  clopidogrel, 75 mg, Oral, Daily  finasteride, 5 mg, Oral, Daily  insulin detemir, 50 Units, Subcutaneous, Q12H  insulin lispro, 0-24 Units, Subcutaneous, TID AC  latanoprost, 1 drop, Both Eyes, Nightly  metoprolol succinate XL, 12.5 mg, Oral, Q24H  mirtazapine, 15 mg, Oral, Nightly  pantoprazole, 40 mg, Oral, Q AM  pioglitazone, 30 mg, Oral, Daily  rosuvastatin, 10 mg, Oral, Nightly  sodium chloride, 10 mL, Intravenous, Q12H  timolol, 1 drop, Both Eyes, BID      Continuous Infusions:   PRN Meds:.•  acetaminophen **OR** acetaminophen **OR** acetaminophen  •  albuterol  •  dextrose  •  dextrose  •  dextrose  •   dextrose  •  famotidine  •  glucagon (human recombinant)  •  glucagon (human recombinant)  •  guaiFENesin-dextromethorphan  •  ipratropium-albuterol  •  loperamide    Assessment & Plan   Assessment & Plan     Active Hospital Problems    Diagnosis  POA   • **Odynophagia [R13.10]  Unknown   • Self-catheterizes urinary bladder [Z78.9]  Unknown   • Acute UTI (urinary tract infection) [N39.0]  Unknown   • Sepsis (HCC) [A41.9]  Yes   • Type 2 diabetes mellitus with hyperglycemia (HCC) [E11.65]  Yes   • Pneumonia [J18.9]  Yes   • CAD (coronary artery disease) [I25.10]  Yes   • Hyperkalemia [E87.5]  Yes   • Acute renal failure superimposed on stage 3 chronic kidney disease (HCC) [N17.9, N18.30]  Yes   • Dyspnea and respiratory abnormalities [R06.00, R06.89]  Yes   • COPD [J44.9]  Yes   • Dyslipidemia [E78.5]  Yes   • HTN [I10]  Yes   • AGATHA on CPAP [G47.33, Z99.89]  Not Applicable      Resolved Hospital Problems   No resolved problems to display.     Brief Hospital Course to date:  Pro Pal is a 82 y.o. male with past medical history of COPD, type 2 diabetes, obstructive sleep apnea on CPAP, coronary artery disease, essential hypertension, BPH, dyslipidemia, complete heart block status post pacemaker, who presented to the hospital with increasing fatigue, worsening shortness of breath and productive cough.    Assessment and plan:  Mild JONATHAN on CKD stage III     Hyperkalemia, resolved  · Likely secondary to dehydration volume depletion in the context of UTI  · Baseline creatinine around 2  ·  had JONATHAN with creatinine peaking at 2.8.  Failed IV fluid challenge  · Serial bladder scan and obtain renal ultrasound to rule out obstructive uropathy/hydronephrosis  · Appreciate help from Dr. Zhang/nephrology  · Continue to monitor kidney functions closely    HHS, resolved  Poorly controlled type 2 diabetes, A1c 11.8%  Medication noncompliance  · Did not take his insulin for 6 days before presenting to the hospital  · Has been  drinking plenty of Gatorade.  Blood sugar was markedly evaded on admission.  Started on Glucomander HHS protocol  · Average insulin requirement while hospitalized is 5 to 6 units/h over the last 24 hours (around 100 to 120 units daily).  He is taking insulin Humulin R U500, 260 units at home (130 5 in the AM and 120 5 in the PM)  · Insulin drip discontinued 9/6/2022.  Started on insulin Levemir 50 units twice daily with sliding scale insulin which achieved adequate blood sugar control  · Patient is following with Ms. Joanna Suggs/NP at endocrine clinic at  with the patient has his diabetes follow-up.  I personally discussed with Dr. Butterfield/endocrine clinic attending at  on 9/12/2022 and he recommended to decrease his home dose insulin Humulin R U500 to equivalent dos for what he was getting at the hospital (50-60 units SQ twice daily ) upon discharge  · The patient was counseled about the importance to comply with his insulin    Severe sepsis 2/2 UTI, improved  Leukocytosis, improved  · CT scan chest essentially ruled out pneumonia and patient with minimal respiratory symptoms  · Urine analysis is positive for UTI and urine culture is pending.  Patient self cath at home which might be the cause of his UTI  · MRSA swab negative, vancomycin discontinued   · Initially received Zosyn that was eventually changed to p.o. Levaquin based on urine culture showing pansensitive E. coli      Dysphagia to solid  · Evaluated by speech team and felt that his dysphagia is esophageal in origin  · GI performed EGD 0 9/8/2022 that showed small hiatal hernia, GERD and reflux esophagitis.  No evidence of esophageal stricture.  Patient did not undergo dilation today because he was on Plavix.    · GI recommended to initiate PPI and monitor for symptoms.   · Needs to follow with GI clinic in 2 weeks for EGD and esophageal dilation     Coronary artery disease  · Status post PCI in May 2021  · Continue ASA, plavix, statin       Hx of  complete heart block   · S/p PPM     AGATHA   · CPAP at HS       Expected Discharge Location and Transportation: Home   Expected Discharge Date: 9/13/2022    DVT prophylaxis:  No DVT prophylaxis order currently exists.     AM-PAC 6 Clicks Score (PT): 20 (09/12/22 0800)    CODE STATUS:   Code Status and Medical Interventions:   Ordered at: 09/05/22 9389     Level Of Support Discussed With:    Patient     Code Status (Patient has no pulse and is not breathing):    CPR (Attempt to Resuscitate)     Medical Interventions (Patient has pulse or is breathing):    Full Support       Liborio Allen MD  09/12/22

## 2022-09-13 ENCOUNTER — APPOINTMENT (OUTPATIENT)
Dept: CT IMAGING | Facility: HOSPITAL | Age: 82
End: 2022-09-13

## 2022-09-13 LAB
ANION GAP SERPL CALCULATED.3IONS-SCNC: 15 MMOL/L (ref 5–15)
BUN SERPL-MCNC: 63 MG/DL (ref 8–23)
BUN/CREAT SERPL: 24.6 (ref 7–25)
CALCIUM SPEC-SCNC: 9.1 MG/DL (ref 8.6–10.5)
CHLORIDE SERPL-SCNC: 104 MMOL/L (ref 98–107)
CO2 SERPL-SCNC: 19 MMOL/L (ref 22–29)
CREAT SERPL-MCNC: 2.56 MG/DL (ref 0.76–1.27)
EGFRCR SERPLBLD CKD-EPI 2021: 24.2 ML/MIN/1.73
GLUCOSE BLDC GLUCOMTR-MCNC: 186 MG/DL (ref 70–130)
GLUCOSE BLDC GLUCOMTR-MCNC: 193 MG/DL (ref 70–130)
GLUCOSE BLDC GLUCOMTR-MCNC: 195 MG/DL (ref 70–130)
GLUCOSE BLDC GLUCOMTR-MCNC: 306 MG/DL (ref 70–130)
GLUCOSE BLDC GLUCOMTR-MCNC: 96 MG/DL (ref 70–130)
GLUCOSE SERPL-MCNC: 227 MG/DL (ref 65–99)
HCT VFR BLD AUTO: 31.4 % (ref 37.5–51)
HCT VFR BLD AUTO: 32.9 % (ref 37.5–51)
HCT VFR BLD AUTO: 35.9 % (ref 37.5–51)
HCT VFR BLD AUTO: 36.7 % (ref 37.5–51)
HGB BLD-MCNC: 11.4 G/DL (ref 13–17.7)
HGB BLD-MCNC: 11.5 G/DL (ref 13–17.7)
HGB BLD-MCNC: 11.6 G/DL (ref 13–17.7)
HGB BLD-MCNC: 11.7 G/DL (ref 13–17.7)
POTASSIUM SERPL-SCNC: 5.3 MMOL/L (ref 3.5–5.2)
SODIUM SERPL-SCNC: 138 MMOL/L (ref 136–145)

## 2022-09-13 PROCEDURE — 63710000001 ASPIRIN 81 MG TABLET DELAYED-RELEASE: Performed by: INTERNAL MEDICINE

## 2022-09-13 PROCEDURE — 63710000001 GUAIFENESIN-DEXTROMETHORPHAN 100-10 MG/5ML SYRUP: Performed by: INTERNAL MEDICINE

## 2022-09-13 PROCEDURE — A9270 NON-COVERED ITEM OR SERVICE: HCPCS | Performed by: INTERNAL MEDICINE

## 2022-09-13 PROCEDURE — 85018 HEMOGLOBIN: CPT | Performed by: INTERNAL MEDICINE

## 2022-09-13 PROCEDURE — 97530 THERAPEUTIC ACTIVITIES: CPT

## 2022-09-13 PROCEDURE — 85014 HEMATOCRIT: CPT | Performed by: INTERNAL MEDICINE

## 2022-09-13 PROCEDURE — 82962 GLUCOSE BLOOD TEST: CPT

## 2022-09-13 PROCEDURE — 63710000001 FINASTERIDE 5 MG TABLET: Performed by: INTERNAL MEDICINE

## 2022-09-13 PROCEDURE — G0378 HOSPITAL OBSERVATION PER HR: HCPCS

## 2022-09-13 PROCEDURE — 63710000001 METOPROLOL SUCCINATE XL 25 MG TABLET SUSTAINED-RELEASE 24 HOUR: Performed by: INTERNAL MEDICINE

## 2022-09-13 PROCEDURE — 63710000001 INSULIN LISPRO (HUMAN) PER 5 UNITS: Performed by: INTERNAL MEDICINE

## 2022-09-13 PROCEDURE — 63710000001 MIRTAZAPINE 15 MG TABLET: Performed by: NURSE PRACTITIONER

## 2022-09-13 PROCEDURE — A9270 NON-COVERED ITEM OR SERVICE: HCPCS | Performed by: NURSE PRACTITIONER

## 2022-09-13 PROCEDURE — 63710000001: Performed by: INTERNAL MEDICINE

## 2022-09-13 PROCEDURE — 63710000001 INSULIN DETEMIR PER 5 UNITS: Performed by: INTERNAL MEDICINE

## 2022-09-13 PROCEDURE — 63710000001 CETIRIZINE 10 MG TABLET: Performed by: INTERNAL MEDICINE

## 2022-09-13 PROCEDURE — 63710000001 PANTOPRAZOLE 40 MG TABLET DELAYED-RELEASE: Performed by: INTERNAL MEDICINE

## 2022-09-13 PROCEDURE — 74176 CT ABD & PELVIS W/O CONTRAST: CPT

## 2022-09-13 PROCEDURE — 63710000001 NYSTATIN 100000 UNIT/GM CREAM 15 G TUBE: Performed by: INTERNAL MEDICINE

## 2022-09-13 PROCEDURE — 25010000002 ONDANSETRON PER 1 MG: Performed by: INTERNAL MEDICINE

## 2022-09-13 PROCEDURE — 51798 US URINE CAPACITY MEASURE: CPT

## 2022-09-13 PROCEDURE — 63710000001 CLOPIDOGREL 75 MG TABLET: Performed by: INTERNAL MEDICINE

## 2022-09-13 PROCEDURE — 63710000001 SODIUM ZIRCONIUM CYCLOSILICATE 10 G PACK: Performed by: INTERNAL MEDICINE

## 2022-09-13 PROCEDURE — 99226 PR SBSQ OBSERVATION CARE/DAY 35 MINUTES: CPT | Performed by: INTERNAL MEDICINE

## 2022-09-13 PROCEDURE — 80048 BASIC METABOLIC PNL TOTAL CA: CPT | Performed by: INTERNAL MEDICINE

## 2022-09-13 PROCEDURE — 97116 GAIT TRAINING THERAPY: CPT

## 2022-09-13 PROCEDURE — 63710000001 ROSUVASTATIN 10 MG TABLET: Performed by: INTERNAL MEDICINE

## 2022-09-13 PROCEDURE — 63710000001 PIOGLITAZONE 15 MG TABLET: Performed by: INTERNAL MEDICINE

## 2022-09-13 PROCEDURE — 63710000001 AMLODIPINE 2.5 MG TABLET: Performed by: INTERNAL MEDICINE

## 2022-09-13 RX ORDER — NYSTATIN 100000 U/G
1 CREAM TOPICAL EVERY 12 HOURS SCHEDULED
Status: DISCONTINUED | OUTPATIENT
Start: 2022-09-13 | End: 2022-09-14 | Stop reason: HOSPADM

## 2022-09-13 RX ORDER — CAPSAICIN 0.75 MG/G
1 CREAM TOPICAL EVERY 8 HOURS SCHEDULED
Status: DISCONTINUED | OUTPATIENT
Start: 2022-09-13 | End: 2022-09-13

## 2022-09-13 RX ORDER — ONDANSETRON 2 MG/ML
4 INJECTION INTRAMUSCULAR; INTRAVENOUS EVERY 6 HOURS PRN
Status: DISCONTINUED | OUTPATIENT
Start: 2022-09-13 | End: 2022-09-14 | Stop reason: HOSPADM

## 2022-09-13 RX ORDER — CAPSAICIN 0.75 MG/G
1 CREAM TOPICAL EVERY 8 HOURS SCHEDULED
Status: DISCONTINUED | OUTPATIENT
Start: 2022-09-13 | End: 2022-09-14 | Stop reason: HOSPADM

## 2022-09-13 RX ADMIN — TIMOLOL MALEATE 1 DROP: 5 SOLUTION/ DROPS OPHTHALMIC at 22:26

## 2022-09-13 RX ADMIN — TIMOLOL MALEATE 1 DROP: 5 SOLUTION/ DROPS OPHTHALMIC at 08:44

## 2022-09-13 RX ADMIN — METOPROLOL SUCCINATE 12.5 MG: 25 TABLET, EXTENDED RELEASE ORAL at 09:01

## 2022-09-13 RX ADMIN — FINASTERIDE 5 MG: 5 TABLET, FILM COATED ORAL at 09:01

## 2022-09-13 RX ADMIN — LATANOPROST 1 DROP: 50 SOLUTION OPHTHALMIC at 22:26

## 2022-09-13 RX ADMIN — CLOPIDOGREL BISULFATE 75 MG: 75 TABLET ORAL at 09:01

## 2022-09-13 RX ADMIN — CAPSAICIN 1 APPLICATION: 0.75 CREAM TOPICAL at 10:56

## 2022-09-13 RX ADMIN — ASPIRIN 81 MG: 81 TABLET, COATED ORAL at 09:02

## 2022-09-13 RX ADMIN — INSULIN LISPRO 16 UNITS: 100 INJECTION, SOLUTION INTRAVENOUS; SUBCUTANEOUS at 13:36

## 2022-09-13 RX ADMIN — AMLODIPINE BESYLATE 2.5 MG: 2.5 TABLET ORAL at 09:02

## 2022-09-13 RX ADMIN — PANTOPRAZOLE SODIUM 40 MG: 40 TABLET, DELAYED RELEASE ORAL at 05:06

## 2022-09-13 RX ADMIN — INSULIN DETEMIR 50 UNITS: 100 INJECTION, SOLUTION SUBCUTANEOUS at 09:31

## 2022-09-13 RX ADMIN — ROSUVASTATIN CALCIUM 10 MG: 10 TABLET, COATED ORAL at 22:25

## 2022-09-13 RX ADMIN — INSULIN LISPRO 4 UNITS: 100 INJECTION, SOLUTION INTRAVENOUS; SUBCUTANEOUS at 09:31

## 2022-09-13 RX ADMIN — CETIRIZINE HYDROCHLORIDE 10 MG: 10 TABLET, FILM COATED ORAL at 09:02

## 2022-09-13 RX ADMIN — GUAIFENESIN AND DEXTROMETHORPHAN 10 ML: 100; 10 SYRUP ORAL at 10:55

## 2022-09-13 RX ADMIN — SODIUM ZIRCONIUM CYCLOSILICATE 10 G: 10 POWDER, FOR SUSPENSION ORAL at 15:13

## 2022-09-13 RX ADMIN — INSULIN DETEMIR 50 UNITS: 100 INJECTION, SOLUTION SUBCUTANEOUS at 22:37

## 2022-09-13 RX ADMIN — ONDANSETRON 4 MG: 2 INJECTION INTRAMUSCULAR; INTRAVENOUS at 08:50

## 2022-09-13 RX ADMIN — NYSTATIN 1 APPLICATION: 100000 CREAM TOPICAL at 22:26

## 2022-09-13 RX ADMIN — PIOGLITAZONE 30 MG: 15 TABLET ORAL at 09:02

## 2022-09-13 RX ADMIN — NYSTATIN 1 APPLICATION: 100000 CREAM TOPICAL at 10:55

## 2022-09-13 RX ADMIN — CAPSAICIN 1 APPLICATION: 0.75 CREAM TOPICAL at 22:26

## 2022-09-13 RX ADMIN — MIRTAZAPINE 15 MG: 15 TABLET, FILM COATED ORAL at 22:25

## 2022-09-13 RX ADMIN — Medication 10 ML: at 22:25

## 2022-09-13 NOTE — PLAN OF CARE
Goal Outcome Evaluation:  Plan of Care Reviewed With: patient        Progress: improving  Outcome Evaluation: Pt limited today by fatigue and feeling generally weak. Amb 140' with FWW and CGA without LOB. BP dropped when transitioning from supine>sitting EOB but resolved quickly into therapeutic limits. Pt educated on importance of hydration and ambulating with assistance throughout the day. Continue to rec home with assist and OPPT services upon d/c.

## 2022-09-13 NOTE — PROGRESS NOTES
"   LOS: 1 day    Patient Care Team:  Leonard Silva MBBS as PCP - General (Family Medicine)    JONATHAN follow up     Subjective     Interval History:     No acute events overnight. No new complaints        Review of Systems:   No CP or SOA , fever, chills, rigors, rash, N/V, Constipation.       Objective     Vital Sign Min/Max for last 24 hours  Temp  Min: 97.7 °F (36.5 °C)  Max: 98.3 °F (36.8 °C)   BP  Min: 124/57  Max: 165/68   Pulse  Min: 71  Max: 81   Resp  Min: 16  Max: 18   SpO2  Min: 95 %  Max: 96 %   No data recorded   No data recorded     Flowsheet Rows    Flowsheet Row First Filed Value   Admission Height 185.4 cm (73\") Documented at 09/05/2022 1907   Admission Weight 102 kg (225 lb) Documented at 09/05/2022 1907          No intake/output data recorded.  I/O last 3 completed shifts:  In: 960 [P.O.:960]  Out: 2123 [Urine:2123]    Physical Exam:    Gen: Alert, NAD   HENT: NC, AT, EOMI   NECK: Supple, no JVD, Trachea midline   LUNGS: CTA bilaterally, non labored respirtation   CVS: S1/S2 audible, RRR, no murmur   Abd: Soft, NT, ND, BS+   Ext: No pedal edema, no cyanosis   CNS: Alert, No focal deficit noted grossly  Psy: Cooperative  Skin: Warm, dry and intact      WBC WBC   Date Value Ref Range Status   09/12/2022 12.28 (H) 3.40 - 10.80 10*3/mm3 Final   09/11/2022 11.59 (H) 3.40 - 10.80 10*3/mm3 Final      HGB Hemoglobin   Date Value Ref Range Status   09/13/2022 11.4 (L) 13.0 - 17.7 g/dL Final   09/13/2022 11.5 (L) 13.0 - 17.7 g/dL Final   09/12/2022 11.5 (L) 13.0 - 17.7 g/dL Final   09/12/2022 11.7 (L) 13.0 - 17.7 g/dL Final   09/12/2022 11.6 (L) 13.0 - 17.7 g/dL Final   09/12/2022 12.4 (L) 13.0 - 17.7 g/dL Final   09/12/2022 11.1 (L) 13.0 - 17.7 g/dL Final   09/12/2022 11.2 (L) 13.0 - 17.7 g/dL Final   09/11/2022 11.0 (L) 13.0 - 17.7 g/dL Final   09/11/2022 10.9 (L) 13.0 - 17.7 g/dL Final   09/11/2022 12.3 (L) 13.0 - 17.7 g/dL Final   09/11/2022 11.9 (L) 13.0 - 17.7 g/dL Final   09/11/2022 11.9 (L) " 13.0 - 17.7 g/dL Final   09/11/2022 11.9 (L) 13.0 - 17.7 g/dL Final   09/10/2022 12.0 (L) 13.0 - 17.7 g/dL Final   09/10/2022 11.2 (L) 13.0 - 17.7 g/dL Final   09/10/2022 11.4 (L) 13.0 - 17.7 g/dL Final      HCT Hematocrit   Date Value Ref Range Status   09/13/2022 31.4 (L) 37.5 - 51.0 % Final   09/13/2022 32.9 (L) 37.5 - 51.0 % Final   09/12/2022 33.5 (L) 37.5 - 51.0 % Final   09/12/2022 37.4 (L) 37.5 - 51.0 % Final   09/12/2022 35.3 (L) 37.5 - 51.0 % Final   09/12/2022 34.4 (L) 37.5 - 51.0 % Final   09/12/2022 31.8 (L) 37.5 - 51.0 % Final   09/12/2022 31.2 (L) 37.5 - 51.0 % Final   09/11/2022 33.8 (L) 37.5 - 51.0 % Final   09/11/2022 33.5 (L) 37.5 - 51.0 % Final   09/11/2022 33.6 (L) 37.5 - 51.0 % Final   09/11/2022 34.9 (L) 37.5 - 51.0 % Final   09/11/2022 34.9 (L) 37.5 - 51.0 % Final   09/11/2022 34.6 (L) 37.5 - 51.0 % Final   09/10/2022 35.0 (L) 37.5 - 51.0 % Final   09/10/2022 30.2 (L) 37.5 - 51.0 % Final   09/10/2022 33.2 (L) 37.5 - 51.0 % Final      Platlets No results found for: LABPLAT   MCV MCV   Date Value Ref Range Status   09/12/2022 93.2 79.0 - 97.0 fL Final   09/11/2022 92.1 79.0 - 97.0 fL Final          Sodium Sodium   Date Value Ref Range Status   09/13/2022 138 136 - 145 mmol/L Final   09/12/2022 143 136 - 145 mmol/L Final   09/11/2022 141 136 - 145 mmol/L Final      Potassium Potassium   Date Value Ref Range Status   09/13/2022 5.3 (H) 3.5 - 5.2 mmol/L Final     Comment:     Slight hemolysis detected by analyzer. Results may be affected.   09/12/2022 5.3 (H) 3.5 - 5.2 mmol/L Final     Comment:     Slight hemolysis detected by analyzer. Results may be affected.   09/11/2022 4.7 3.5 - 5.2 mmol/L Final      Chloride Chloride   Date Value Ref Range Status   09/13/2022 104 98 - 107 mmol/L Final   09/12/2022 104 98 - 107 mmol/L Final   09/11/2022 105 98 - 107 mmol/L Final      CO2 CO2   Date Value Ref Range Status   09/13/2022 19.0 (L) 22.0 - 29.0 mmol/L Final   09/12/2022 24.0 22.0 - 29.0 mmol/L Final    09/11/2022 23.0 22.0 - 29.0 mmol/L Final      BUN BUN   Date Value Ref Range Status   09/13/2022 63 (H) 8 - 23 mg/dL Final   09/12/2022 62 (H) 8 - 23 mg/dL Final   09/11/2022 66 (H) 8 - 23 mg/dL Final      Creatinine Creatinine   Date Value Ref Range Status   09/13/2022 2.56 (H) 0.76 - 1.27 mg/dL Final   09/12/2022 2.61 (H) 0.76 - 1.27 mg/dL Final   09/11/2022 2.74 (H) 0.76 - 1.27 mg/dL Final      Calcium Calcium   Date Value Ref Range Status   09/13/2022 9.1 8.6 - 10.5 mg/dL Final   09/12/2022 9.3 8.6 - 10.5 mg/dL Final   09/11/2022 8.9 8.6 - 10.5 mg/dL Final      PO4 No results found for: CAPO4   Albumin No results found for: ALBUMIN   Magnesium No results found for: MG   Uric Acid No results found for: URICACID        Results Review:     I reviewed the patient's new clinical results.    amLODIPine, 2.5 mg, Oral, Daily  aspirin, 81 mg, Oral, Daily  capsaicin, 1 application, Topical, Q8H  cetirizine, 10 mg, Oral, Daily  clopidogrel, 75 mg, Oral, Daily  finasteride, 5 mg, Oral, Daily  insulin detemir, 50 Units, Subcutaneous, Q12H  insulin lispro, 0-24 Units, Subcutaneous, TID AC  latanoprost, 1 drop, Both Eyes, Nightly  metoprolol succinate XL, 12.5 mg, Oral, Q24H  mirtazapine, 15 mg, Oral, Nightly  nystatin, 1 application, Topical, Q12H  pantoprazole, 40 mg, Oral, Q AM  pioglitazone, 30 mg, Oral, Daily  rosuvastatin, 10 mg, Oral, Nightly  sodium chloride, 10 mL, Intravenous, Q12H  timolol, 1 drop, Both Eyes, BID           Medication Review:     FINDINGS:   Right kidney:  The kidney measures 10.8 x 4.9 x 5.4 cm. Normal renal cortical  echogenicity without evidence of renal cortical atrophy. No  hydronephrosis. No echogenic shadowing foci to suggest nephrolithiasis.  No perinephric fluid. No discrete renal lesion. Normal renal hilar  vascularity on color Doppler assessment.     Left kidney: The kidney measures 12.4 x 5.5 x 4.2 cm. Normal renal  cortical echogenicity without evidence of renal cortical atrophy.  No  hydronephrosis. No echogenic shadowing foci to suggest nephrolithiasis.  No perinephric fluid. No discrete renal lesion. Normal renal hilar  vascularity on color Doppler assessment.     Bladder:  Normal appearance of the bladder. Partial visualization of enlarged  nodular prostate protruding into the inferior bladder.     IMPRESSION:  No appreciable hydronephrosis.    Assessment & Plan       Odynophagia    COPD    Dyslipidemia    AGATHA on CPAP    HTN    Sepsis (HCC)    Type 2 diabetes mellitus with hyperglycemia (HCC)    Pneumonia    CAD (coronary artery disease)    Hyperkalemia    Acute renal failure superimposed on stage 3 chronic kidney disease (HCC)    Dyspnea and respiratory abnormalities    Self-catheterizes urinary bladder    Acute UTI (urinary tract infection)    1- JONATHAN on CKD stage III - Improving slowly. Pre-renal azotemia vs Intrinsic renal disease. FeNa - 2.15% suggestive of intrinsic renal disease plus urinary retention  2- CKD stage III - Baseline Scr 1.8-2.0 range.   3- Metabolic acidosis   4- Urinary retention   5- Hyperkalemia   6- Odynophagia  7- UTI      Plan:  - Dose of lokelma   - Renal diet.   - Avoid nephrotoxic agents   - Adjust meds per renal function   - No need of RRT.   - If continues to have issues of urinary retention and self catheterization - consider urology consult.     Shaji Engel MD  09/13/22  10:18 EDT

## 2022-09-13 NOTE — THERAPY TREATMENT NOTE
Patient Name: Pro Pal  : 1940    MRN: [id number]                              Today's Date: 2022       Admit Date: 2022    Visit Dx:     ICD-10-CM ICD-9-CM   1. Dyspnea and respiratory abnormalities  R06.00 786.09    R06.89    2. Bandemia  D72.825 288.66   3. Renal insufficiency  N28.9 593.9   4. Cough  R05.9 786.2   5. Chronic obstructive pulmonary disease, unspecified COPD type ([facility])  J44.9 496   6. Hyperglycemia  R73.9 790.29   7. Odynophagia  R13.10 787.20   8. Weakness  R53.1 780.79   9. Esophageal dysphagia  R13.19 787.29   10. Acute renal failure superimposed on stage 3 chronic kidney disease, unspecified acute renal failure type, unspecified whether stage 3a or 3b CKD ([facility])  N17.9 584.9    N18.30 585.3   11. Type 2 diabetes mellitus with hyperglycemia, with long-term current use of insulin ([facility])  E11.65 250.00    Z79.4 790.29     V58.67   12. Acute UTI (urinary tract infection)  N39.0 599.0     Patient Active Problem List   Diagnosis   • STEMI   • COPD   • T2DM   • Dyslipidemia   • AGATHA on CPAP   • Enlarged prostate requiring self-catheterization   • Complete heart block   • HTN   • Gross hematuria   • Acute UTI (urinary tract infection)   • Sepsis ([facility])   • Type 2 diabetes mellitus with hyperglycemia ([facility])   • Pneumonia   • CAD (coronary artery disease)   • Hyperkalemia   • Acute renal failure superimposed on stage 3 chronic kidney disease ([facility])   • Dyspnea and respiratory abnormalities   • Self-catheterizes urinary bladder   • Acute UTI (urinary tract infection)   • Odynophagia     Past Medical History:   Diagnosis Date   • CAD (coronary artery disease) 2022   • COPD (chronic obstructive pulmonary disease) ([facility])    • Diabetes mellitus ([facility])    • Dyslipidemia 2021   • HTN 2021   • Sleep apnea    • STEMI 2021     Past Surgical History:   Procedure Laterality Date   • CARDIAC CATHETERIZATION N/A 2021    Procedure: Left Heart Cath;  Surgeon: Elisha Bettencourt  MD RACHEL;  Location:  [facility];  Service: Cardiology;  Laterality: N/A;   • ENDOSCOPY N/A 9/8/2022    Procedure: ESOPHAGOGASTRODUODENOSCOPY;  Surgeon: Elijah Gillis MD;  Location:  [facility];  Service: Gastroenterology;  Laterality: N/A;      General Information     Row Name 09/13/22 1146          Physical Therapy Time and Intention    Document Type therapy note (daily note)  -ES     Mode of Treatment physical therapy  -ES     Row Name 09/13/22 1146          General Information    Patient Profile Reviewed yes  -ES     Existing Precautions/Restrictions fall  -ES     Barriers to Rehab none identified  -ES     Row Name 09/13/22 1146          Cognition    Orientation Status (Cognition) oriented x 4  -ES     Row Name 09/13/22 1146          Safety Issues, Functional Mobility    Safety Issues Affecting Function (Mobility) awareness of need for assistance;insight into deficits/self-awareness;safety precaution awareness;safety precautions follow-through/compliance;sequencing abilities  -ES     Impairments Affecting Function (Mobility) endurance/activity tolerance;strength;balance  -ES           User Key  (r) = Recorded By, (t) = Taken By, (c) = Cosigned By    Initials Name Provider Type    ES Polina Younger PT Physical Therapist               Mobility     Row Name 09/13/22 1147          Bed Mobility    Bed Mobility supine-sit  -ES     Supine-Sit Dolores (Bed Mobility) contact guard;verbal cues  -ES     Assistive Device (Bed Mobility) head of bed elevated;bed rails  -ES     Comment, (Bed Mobility) VC for sequencing  -ES     Row Name 09/13/22 1147          Sit-Stand Transfer    Sit-Stand Dolores (Transfers) contact guard;verbal cues  -ES     Assistive Device (Sit-Stand Transfers) walker, front-wheeled  -ES     Comment, (Sit-Stand Transfer) Used FWW this date due to pt reporting feeling weak. Reported mild dizziness when transitioning from supine>sitting EOB that resolved quickly. BP found  to be within therapeutic limits.  -ES     Row Name 09/13/22 1147          Gait/Stairs (Locomotion)    [place] Level (Gait) contact guard  -ES     Assistive Device (Gait) walker, front-wheeled  -ES     Distance in Feet (Gait) 140  -ES     Deviations/Abnormal Patterns (Gait) anand decreased;gait speed decreased;bilateral deviations  -ES     Bilateral Gait Deviations forward flexed posture  -ES     Comment, (Gait/Stairs) No LOB. Pt limited today by fatigue and feeling weak.  -ES           User Key  (r) = Recorded By, (t) = Taken By, (c) = Cosigned By    Initials Name Provider Type    ES Polina Younger, TAZ Physical Therapist               Obj/Interventions     Row Name 09/13/22 1150          Balance    Balance Assessment sitting static balance;sitting dynamic balance;sit to stand dynamic balance;standing static balance;standing dynamic balance  -ES     Static Sitting Balance independent  -ES     Dynamic Sitting Balance standby assist  -ES     Position, Sitting Balance sitting in chair;unsupported;sitting edge of bed  -ES     Sit to Stand Dynamic Balance contact guard  -ES     Static Standing Balance supervision  -ES     Dynamic Standing Balance contact guard  -ES     Position/Device Used, Standing Balance supported;walker, front-wheeled  -ES     Balance Interventions sitting;sit to stand;standing;supported;occupation based/functional task;dynamic reaching  -ES     Comment, Balance No LOB today.  -ES           User Key  (r) = Recorded By, (t) = Taken By, (c) = Cosigned By    Initials Name Provider Type    ES Polina Younger, PT Physical Therapist               Goals/Plan    No documentation.                Clinical Impression     Row Name 09/13/22 1151          Pain    Pretreatment Pain Rating 0/10 - no pain  -ES     Posttreatment Pain Rating 0/10 - no pain  -ES     Pain Intervention(s) Repositioned;Ambulation/increased activity  -ES     Row Name 09/13/22 1151          Plan of Care Review    Plan of Care  Reviewed With patient  -ES     Progress improving  -ES     Outcome Evaluation Pt limited today by fatigue and feeling generally weak. Amb 140' with FWW and CGA without LOB. BP dropped when transitioning from supine>sitting EOB but resolved quickly into therapeutic limits. Pt educated on importance of hydration and ambulating with assistance throughout the day. Continue to rec home with assist and OPPT services upon d/c.  -ES     Row Name 09/13/22 1151          Therapy Assessment/Plan (PT)    Rehab Potential (PT) good, to achieve stated therapy goals  -ES     Criteria for Skilled Interventions Met (PT) yes;meets criteria;skilled treatment is necessary  -ES     Therapy Frequency (PT) daily  -ES     Row Name 09/13/22 1151          Vital Signs    Pre Systolic BP Rehab 131  -ES     Pre Treatment Diastolic BP 79  -ES     Intra Systolic BP Rehab 103  After transitioning from supine>sitting EOB. Resolved quickly to therapeutic limits.  -ES     Intra Treatment Diastolic BP 63  -ES     Post Systolic BP Rehab 114  -ES     Post Treatment Diastolic BP 53  -ES     Pre SpO2 (%) 95  -ES     O2 Delivery Pre Treatment room air  -ES     Intra SpO2 (%) 96  -ES     O2 Delivery Intra Treatment room air  -ES     Post SpO2 (%) 95  -ES     O2 Delivery Post Treatment room air  -ES     Pre Patient Position Supine  -ES     Intra Patient Position Standing  -ES     Post Patient Position Sitting  -ES     Row Name 09/13/22 1151          Positioning and Restraints    Pre-Treatment Position in bed  -ES     Post Treatment Position chair  -ES     In Chair notified nsg;reclined;sitting;call light within reach;encouraged to call for assist;exit alarm on;legs elevated;heels elevated;waffle cushion  -ES           User Key  (r) = Recorded By, (t) = Taken By, (c) = Cosigned By    Initials Name Provider Type    Polina Maldonado, PT Physical Therapist               Outcome Measures     Row Name 09/13/22 1156          How much help from another person do  you currently need...    Turning from your back to your side while in flat bed without using bedrails? 4  -ES     Moving from lying on back to sitting on the side of a flat bed without bedrails? 3  -ES     Moving to and from a bed to a chair (including a wheelchair)? 3  -ES     Standing up from a chair using your arms (e.g., wheelchair, bedside chair)? 3  -ES     Climbing 3-5 steps with a railing? 3  -ES     To walk in hospital room? 3  -ES     AM-PAC 6 Clicks Score (PT) 19  -ES     Highest level of mobility 6 --> Walked 10 steps or more  -ES     Row Name 09/13/22 1156          Functional Assessment    Outcome Measure Options AM-PAC 6 Clicks Basic Mobility (PT)  -ES           User Key  (r) = Recorded By, (t) = Taken By, (c) = Cosigned By    Initials Name Provider Type    ES Polina Younger, PT Physical Therapist                             Physical Therapy Education                 Title: PT OT SLP Therapies (Done)     Topic: Physical Therapy (Done)     Point: Mobility training (Done)     Learning Progress Summary           Patient Acceptance, E,D, VU by MB at 9/7/2022 1017                   Point: Body mechanics (Done)     Learning Progress Summary           Patient Acceptance, E,D, VU by MB at 9/7/2022 1017                   Point: Precautions (Done)     Learning Progress Summary           Patient Acceptance, E,D, VU by MB at 9/7/2022 1017                               User Key     Initials Effective Dates Name Provider Type Discipline    MB 06/16/21 -  Evelia Garcia, PT Physical Therapist PT              PT Recommendation and Plan     Plan of Care Reviewed With: patient  Progress: improving  Outcome Evaluation: Pt limited today by fatigue and feeling generally weak. Amb 140' with FWW and CGA without LOB. BP dropped when transitioning from supine>sitting EOB but resolved quickly into therapeutic limits. Pt educated on importance of hydration and ambulating with assistance throughout the day. Continue to rec  home with assist and OPPT services upon d/c.     Time Calculation:    PT Charges     Row Name 09/13/22 1156             Time Calculation    Start Time 1119  -ES      PT Received On 09/13/22  -ES      PT Goal Re-Cert Due Date 09/17/22  -ES              Timed Charges    58147 - Gait Training Minutes  12  -ES      68063 - PT Therapeutic Activity Minutes 12  -ES              Total Minutes    Timed Charges Total Minutes 24  -ES       Total Minutes 24  -ES            User Key  (r) = Recorded By, (t) = Taken By, (c) = Cosigned By    Initials Name Provider Type    ES Polina Younger, PT Physical Therapist              Therapy Charges for Today     Code Description Service Date Service Provider Modifiers Qty    88482877884 HC GAIT TRAINING EA 15 MIN 9/13/2022 Polina Younger, PT GP 1    58204857207 HC PT THERAPEUTIC ACT EA 15 MIN 9/13/2022 Polina Younger, PT GP 1          PT G-Codes  Outcome Measure Options: AM-PAC 6 Clicks Basic Mobility (PT)  AM-PAC 6 Clicks Score (PT): 19    Polina Younger PT  9/13/2022

## 2022-09-13 NOTE — PROGRESS NOTES
Afternoon after he returned from D.    [facility] Medicine Services  PROGRESS NOTE    Patient Name: Pro Pal  : 1940  MRN: [id number]    Date of Admission: 2022  Primary Care Physician: Leonard Silva MBBS    Subjective   Subjective     CC:  Follow-up for shortness of breath and HHS    HPI:  I have seen and evaluated the patient this morning.  Feeling well today.  Nauseated and requesting nausea medicine.  Diarrhea somewhat improved.  No shortness of breath.  Residual volume.  Bladder scan is around 450 mL but he was able to self cath and only 100 mL came out.  Still hyperkalemic and his lab    ROS:  General : no fevers, chills  CVS: No chest pain, palpitations  Respiratory: No cough, dyspnea  GI: No N/V/D, abd pain  10 point review of systems is negative except for what is mentioned in HPI     Objective   Objective     Vital Signs:   Temp:  [97.8 °F (36.6 °C)-98.3 °F (36.8 °C)] 98.2 °F (36.8 °C)  Heart Rate:  [71-82] 82  Resp:  [16-18] 18  BP: (109-165)/(57-97) 109/97     Physical Exam:  General: Chronically ill looking, conversant.    Eyes: No Icterus. No pallor  Ears:  Ears appear intact with no abnormalities noted  Throat: No oral lesions, no thrush  Neck: Supple, trachea midline  Lungs: Clear to auscultation bilaterally, equal air entry, no wheezing or crackles  Heart:  Normal S1 and S2, no murmur, no gallop, No JVD, no lower extremity swelling  Abdomen:  Soft, no tenderness, no organomegaly, normal bowel sounds, no organomegaly  Extremities: pulses equal bilaterally  Skin: Rash in perianal area and both buttocks  Neurologic: Cranial nerves appear intact with no evidence of facial asymmetry, normal motor and sensory functions in all 4 extremities  Psych: Alert and oriented x 3, normal mood    Results Reviewed:  LAB RESULTS:      Lab 22  0027 22  1829 22  1629 22  1045 22  0621 22  0535 22  0400 09/10/22  1223  09/10/22  0835 09/09/22  0740 09/09/22  0553 09/08/22  1421 09/08/22  0729   WBC  --   --   --   --  12.28*  --  11.59*  --  14.13*  --  14.19*  --  16.05*   HEMOGLOBIN 11.4*  11.5* 11.5* 11.7* 11.6* 12.4*   < > 11.9*  11.9*   < > 12.4*   < > 12.1*   < > 12.2*   HEMATOCRIT 31.4*  32.9* 33.5* 37.4* 35.3* 34.4*   < > 34.9*  34.9*   < > 37.1*   < > 37.2*   < > 36.1*   PLATELETS  --   --   --   --  536*  --  521*  --  479*  --  448  --  486*   NEUTROS ABS  --   --   --   --  7.52*  --  7.24*  --  9.00*  --  9.27*  --  11.10*   IMMATURE GRANS (ABS)  --   --   --   --  0.16*  --  0.21*  --  0.31*  --  0.39*  --  0.45*   LYMPHS ABS  --   --   --   --  2.73  --  2.56  --  2.91  --  2.65  --  2.47   MONOS ABS  --   --   --   --  0.97*  --  0.83  --  1.03*  --  0.96*  --  1.23*   EOS ABS  --   --   --   --  0.85*  --  0.70*  --  0.81*  --  0.83*  --  0.73*   MCV  --   --   --   --  93.2  --  92.1  --  87.7  --  89.4  --  90.9    < > = values in this interval not displayed.         Lab 09/13/22  0452 09/12/22  0621 09/11/22  0400 09/10/22  0835 09/09/22  0553 09/08/22  0729 09/07/22  1427   SODIUM 138 143 141 138 136 135* 137   POTASSIUM 5.3* 5.3* 4.7 4.4 4.6 4.6 5.2   CHLORIDE 104 104 105 100 105 102 103   CO2 19.0* 24.0 23.0 25.0 16.0* 20.0* 17.0*   ANION GAP 15.0 15.0 13.0 13.0 15.0 13.0 17.0*   BUN 63* 62* 66* 67* 71* 67* 71*   CREATININE 2.56* 2.61* 2.74* 2.83* 2.83* 2.64* 2.91*   EGFR 24.2* 23.6* 22.3* 21.4* 21.4* 23.3* 20.7*   GLUCOSE 227* 64* 102* 150* 150* 164* 158*   CALCIUM 9.1 9.3 8.9 9.3 9.2 9.3 8.5*   MAGNESIUM  --   --   --   --   --  1.8 1.9   PHOSPHORUS  --   --   --   --   --  3.9 3.5         Lab 09/08/22  0729 09/07/22  1427   TOTAL PROTEIN 8.0 6.3   ALBUMIN 3.30* 2.90*   GLOBULIN 4.7 3.4   ALT (SGPT) 68* 75*   AST (SGOT) 43* 75*   BILIRUBIN 0.3 0.2   ALK PHOS 154* 140*                     Brief Urine Lab Results  (Last result in the past 365 days)      Color   Clarity   Blood   Leuk Est   Nitrite    Protein   CREAT   Urine HCG        09/09/22 1722             69.4         09/09/22 1722             66.6               Microbiology Results Abnormal     Procedure Component Value - Date/Time    Blood Culture - Blood, Wrist, Right [[id number]]  (Normal) Collected: 09/05/22 2015    Lab Status: Final result Specimen: Blood from Wrist, Right Updated: 09/10/22 2102     Blood Culture No growth at 5 days    Blood Culture - Blood, Arm, Left [[id number]]  (Normal) Collected: 09/05/22 2020    Lab Status: Final result Specimen: Blood from Arm, Left Updated: 09/10/22 2102     Blood Culture No growth at 5 days    S. Pneumo Ag Urine or CSF - Urine, Urine, Clean Catch [[id number]]  (Normal) Collected: 09/06/22 0159    Lab Status: Final result Specimen: Urine, Clean Catch Updated: 09/06/22 0929     Strep Pneumo Ag Negative    Legionella Antigen, Urine - Urine, Urine, Clean Catch [[id number]]  (Normal) Collected: 09/06/22 0159    Lab Status: Final result Specimen: Urine, Clean Catch Updated: 09/06/22 0929     LEGIONELLA ANTIGEN, URINE Negative    MRSA Screen, PCR (Inpatient) - Swab, Nares [[id number]]  (Normal) Collected: 09/06/22 0619    Lab Status: Final result Specimen: Swab from Nares Updated: 09/06/22 0813     MRSA PCR Negative    Narrative:      The negative predictive value of this diagnostic test is high and should only be used to consider de-escalating anti-MRSA therapy. A positive result may indicate colonization with MRSA and must be correlated clinically.  MRSA Negative    Respiratory Panel PCR w/COVID-19(SARS-CoV-2) TREASURE/MAIKOL/ELTON/PAD/COR/MAD/KISHORE In-House, NP Swab in UTM/VTM, 3-4 HR TAT - Swab, Nasopharynx [[id number]]  (Normal) Collected: 09/06/22 0619    Lab Status: Final result Specimen: Swab from Nasopharynx Updated: 09/06/22 0750     ADENOVIRUS, PCR Not Detected     Coronavirus 229E Not Detected     Coronavirus HKU1 Not Detected     Coronavirus NL63 Not Detected     Coronavirus OC43 Not Detected     COVID19 Not Detected      Human Metapneumovirus Not Detected     Human Rhinovirus/Enterovirus Not Detected     Influenza A PCR Not Detected     Influenza B PCR Not Detected     Parainfluenza Virus 1 Not Detected     Parainfluenza Virus 2 Not Detected     Parainfluenza Virus 3 Not Detected     Parainfluenza Virus 4 Not Detected     RSV, PCR Not Detected     Bordetella pertussis pcr Not Detected     Bordetella parapertussis PCR Not Detected     Chlamydophila pneumoniae PCR Not Detected     Mycoplasma pneumo by PCR Not Detected    Narrative:      In the setting of a positive respiratory panel with a viral infection PLUS a negative procalcitonin without other underlying concern for bacterial infection, consider observing off antibiotics or discontinuation of antibiotics and continue supportive care. If the respiratory panel is positive for atypical bacterial infection (Bordetella pertussis, Chlamydophila pneumoniae, or Mycoplasma pneumoniae), consider antibiotic de-escalation to target atypical bacterial infection.    COVID PRE-OP / PRE-PROCEDURE SCREENING ORDER (NO ISOLATION) - Swab, Nasopharynx [[id number]]  (Normal) Collected: 09/1940    Lab Status: Final result Specimen: Swab from Nasopharynx Updated: 09/05/22 2011    Narrative:      The following orders were created for panel order COVID PRE-OP / PRE-PROCEDURE SCREENING ORDER (NO ISOLATION) - Swab, Nasopharynx.  Procedure                               Abnormality         Status                     ---------                               -----------         ------                     COVID-19 and FLU A/B PCR...[[id number]]  Normal              Final result                 Please view results for these tests on the individual orders.    COVID-19 and FLU A/B PCR - Swab, Nasopharynx [[id number]]  (Normal) Collected: 09/1940    Lab Status: Final result Specimen: Swab from Nasopharynx Updated: 09/05/22 2011     COVID19 Not Detected     Influenza A PCR Not Detected     Influenza B PCR Not  Detected    Narrative:      Fact sheet for providers: https://www.fda.gov/media/016440/download    Fact sheet for patients: https://www.fda.gov/media/136272/download    Test performed by PCR.        US Renal Limited    Result Date: 9/12/2022  DATE OF EXAM: 9/12/2022 2:46 PM  PROCEDURE: US RENAL LIMITED-  INDICATIONS: r/o hydronephrosis; R06.00-Dyspnea, unspecified; R06.89-Other abnormalities of breathing; D72.825-Bandemia; N28.9-Disorder of kidney and ureter, unspecified; R05.9-Cough, unspecified; J44.9-Chronic obstructive pulmonary disease, unspecified; R73.9-Hyperglycemia, unspecified; R13.10-Dysphagia, unspecified; R53.1-Weakness; R13.19-Other dysphagia; N17.9-Acute kidney failure, unspecified; N18.30-  COMPARISON: Renal ultrasound 9/9/2022  TECHNIQUE: Transverse and sagittal grayscale sonographic assessment of the kidneys and bladder supplemented with color Doppler.  FINDINGS: Right kidney: The kidney measures 10.8 x 4.9 x 5.4 cm. Normal renal cortical echogenicity without evidence of renal cortical atrophy. No hydronephrosis. No echogenic shadowing foci to suggest nephrolithiasis. No perinephric fluid. No discrete renal lesion. Normal renal hilar vascularity on color Doppler assessment.  Left kidney: The kidney measures 12.4 x 5.5 x 4.2 cm. Normal renal cortical echogenicity without evidence of renal cortical atrophy. No hydronephrosis. No echogenic shadowing foci to suggest nephrolithiasis. No perinephric fluid. No discrete renal lesion. Normal renal hilar vascularity on color Doppler assessment.  Bladder: Normal appearance of the bladder. Partial visualization of enlarged nodular prostate protruding into the inferior bladder.      Impression: No appreciable hydronephrosis.  This report was finalized on 9/12/2022 3:39 PM by Migel Shaw MD.        Results for orders placed during the hospital encounter of 05/24/21    Adult Transthoracic Echo Complete W/ Cont if Necessary Per Protocol    Interpretation  Summary  · Estimated left ventricular EF = 55% Left ventricular systolic function is normal.  · Left ventricular diastolic function is consistent with (grade II w/high LAP) pseudonormalization.  · Mild mitral valve regurgitation is present.  · Mild tricuspid valve regurgitation is present.  · Calculated right ventricular systolic pressure from tricuspid regurgitation is 39 mmHg.    I have reviewed the medications:  Scheduled Meds:amLODIPine, 2.5 mg, Oral, Daily  aspirin, 81 mg, Oral, Daily  capsaicin, 1 application, Topical, Q8H  cetirizine, 10 mg, Oral, Daily  clopidogrel, 75 mg, Oral, Daily  finasteride, 5 mg, Oral, Daily  insulin detemir, 50 Units, Subcutaneous, Q12H  insulin lispro, 0-24 Units, Subcutaneous, TID AC  latanoprost, 1 drop, Both Eyes, Nightly  metoprolol succinate XL, 12.5 mg, Oral, Q24H  mirtazapine, 15 mg, Oral, Nightly  nystatin, 1 application, Topical, Q12H  pantoprazole, 40 mg, Oral, Q AM  pioglitazone, 30 mg, Oral, Daily  rosuvastatin, 10 mg, Oral, Nightly  sodium chloride, 10 mL, Intravenous, Q12H  timolol, 1 drop, Both Eyes, BID      Continuous Infusions:   PRN Meds:.•  acetaminophen **OR** acetaminophen **OR** acetaminophen  •  albuterol  •  dextrose  •  dextrose  •  dextrose  •  dextrose  •  famotidine  •  glucagon (human recombinant)  •  glucagon (human recombinant)  •  guaiFENesin-dextromethorphan  •  ipratropium-albuterol  •  loperamide  •  ondansetron    Assessment & Plan   Assessment & Plan     Active Hospital Problems    Diagnosis  POA   • **Odynophagia [R13.10]  Unknown   • Self-catheterizes urinary bladder [Z78.9]  Unknown   • Acute UTI (urinary tract infection) [N39.0]  Unknown   • Sepsis (HCC) [A41.9]  Yes   • Type 2 diabetes mellitus with hyperglycemia (HCC) [E11.65]  Yes   • Pneumonia [J18.9]  Yes   • CAD (coronary artery disease) [I25.10]  Yes   • Hyperkalemia [E87.5]  Yes   • Acute renal failure superimposed on stage 3 chronic kidney disease (HCC) [N17.9, N18.30]  Yes   •  Dyspnea and respiratory abnormalities [R06.00, R06.89]  Yes   • COPD [J44.9]  Yes   • Dyslipidemia [E78.5]  Yes   • HTN [I10]  Yes   • AGATHA on CPAP [G47.33, Z99.89]  Not Applicable      Resolved Hospital Problems   No resolved problems to display.     Brief Hospital Course to date:  Pro Pal is a 82 y.o. male with past medical history of COPD, type 2 diabetes, obstructive sleep apnea on CPAP, coronary artery disease, essential hypertension, BPH, dyslipidemia, complete heart block status post pacemaker, who presented to the hospital with increasing fatigue, worsening shortness of breath and productive cough.    Assessment and plan:  Mild JONATHAN on CKD stage III     Hyperkalemia, improved  · Hyperkalemia is likely secondary to dehydration volume depletion in the context of UTI  · Baseline creatinine around 2  · On admission his creatinine peaked at 2.8.  Partially improved with IV fluids.  Creatinine today is 2.5  · Renal ultrasound done on 9/12/2022 with no evidence of hydronephrosis  · Patient usually self cath.  He was able to drain only 100 mL even thoug his bladder scan showed 450 mill  · We will obtain CT abdomen to rule out bladder diverticulum  · Continue to monitor kidney functions  · We will give 1 dose of Lokelma  · Nephrology team following.  Appreciate help    [facility], resolved  Poorly controlled type 2 diabetes, A1c 11.8%  Medication noncompliance  · Did not take his insulin for 6 days before presenting to the hospital  · Has been drinking plenty of Gatorade.  Blood sugar was markedly evaded on admission.  Started on Glucomander [facility] protocol  · Average insulin requirement while hospitalized is 5 to 6 units/h over the last 24 hours (around 100 to 120 units daily).  He is taking insulin Humulin R U500, 260 units at home (130 5 in the AM and 120 5 in the PM)  · Insulin drip discontinued 9/6/2022.  Currently receiving insulin Levemir 50 units twice daily with sliding scale insulin which achieved adequate  blood sugar control  · Patient is following with Ms. Joanna Ifeanyi/NP at endocrine clinic at  with the patient has his diabetes follow-up.  I personally discussed with Dr. Butterfield/endocrine clinic attending at  on 9/12/2022 and he recommended to decrease his home dose insulin Humulin R U500 to equivalent dos for what he was getting at the hospital (50-60 units SQ twice daily ) upon discharge  · The patient was counseled about the importance to comply with his insulin and he voiced understanding    Severe sepsis 2/2 UTI, improved  Leukocytosis, improved  · CT scan chest essentially ruled out pneumonia and patient with minimal respiratory symptoms  · Urine analysis is positive for UTI and urine culture is pending.  Patient self cath at home which might be the cause of his UTI  · MRSA swab negative, vancomycin discontinued   · Initially received Zosyn that was eventually changed to p.o. Levaquin based on urine culture showing pansensitive E. coli      Dysphagia to solid  · Evaluated by speech team and felt that his dysphagia is esophageal in origin  · GI performed EGD 0 9/8/2022 that showed small hiatal hernia, GERD and reflux esophagitis.  No evidence of esophageal stricture.  Patient did not undergo dilation today because he was on Plavix.    · GI recommended to initiate PPI and monitor for symptoms.   · Needs to follow with GI clinic in 2 weeks for EGD and esophageal dilation     Coronary artery disease  · Status post PCI in May 2021  · Continue ASA, plavix, statin       Hx of complete heart block   · S/p PPM     AGATHA   · CPAP at HS     Expected Discharge Location and Transportation: Home   Expected Discharge Date: 9/14/2022    DVT prophylaxis:  No DVT prophylaxis order currently exists.     AM-PAC 6 Clicks Score (PT): 19 (09/13/22 8765)    CODE STATUS:   Code Status and Medical Interventions:   Ordered at: 09/05/22 5364     Level Of Support Discussed With:    Patient     Code Status (Patient has no pulse and is  not breathing):    CPR (Attempt to Resuscitate)     Medical Interventions (Patient has pulse or is breathing):    Full Support       Liborio Allen MD  09/13/22

## 2022-09-14 ENCOUNTER — READMISSION MANAGEMENT (OUTPATIENT)
Dept: CALL CENTER | Facility: HOSPITAL | Age: 82
End: 2022-09-14

## 2022-09-14 VITALS
BODY MASS INDEX: 28.08 KG/M2 | HEIGHT: 73 IN | DIASTOLIC BLOOD PRESSURE: 60 MMHG | HEART RATE: 70 BPM | TEMPERATURE: 98.3 F | WEIGHT: 211.9 LBS | RESPIRATION RATE: 18 BRPM | SYSTOLIC BLOOD PRESSURE: 129 MMHG | OXYGEN SATURATION: 94 %

## 2022-09-14 LAB
ALBUMIN SERPL-MCNC: 3.6 G/DL (ref 3.5–5.2)
ALBUMIN/GLOB SERPL: 1 G/DL
ALP SERPL-CCNC: 129 U/L (ref 39–117)
ALT SERPL W P-5'-P-CCNC: 24 U/L (ref 1–41)
ANION GAP SERPL CALCULATED.3IONS-SCNC: 11 MMOL/L (ref 5–15)
AST SERPL-CCNC: 19 U/L (ref 1–40)
BASOPHILS # BLD AUTO: 0.06 10*3/MM3 (ref 0–0.2)
BASOPHILS NFR BLD AUTO: 0.5 % (ref 0–1.5)
BILIRUB SERPL-MCNC: 0.2 MG/DL (ref 0–1.2)
BUN SERPL-MCNC: 59 MG/DL (ref 8–23)
BUN/CREAT SERPL: 23.8 (ref 7–25)
CALCIUM SPEC-SCNC: 9.1 MG/DL (ref 8.6–10.5)
CHLORIDE SERPL-SCNC: 105 MMOL/L (ref 98–107)
CO2 SERPL-SCNC: 22 MMOL/L (ref 22–29)
CREAT SERPL-MCNC: 2.48 MG/DL (ref 0.76–1.27)
DEPRECATED RDW RBC AUTO: 43.8 FL (ref 37–54)
EGFRCR SERPLBLD CKD-EPI 2021: 25.1 ML/MIN/1.73
EOSINOPHIL # BLD AUTO: 0.79 10*3/MM3 (ref 0–0.4)
EOSINOPHIL NFR BLD AUTO: 6 % (ref 0.3–6.2)
ERYTHROCYTE [DISTWIDTH] IN BLOOD BY AUTOMATED COUNT: 13.3 % (ref 12.3–15.4)
GLOBULIN UR ELPH-MCNC: 3.5 GM/DL
GLUCOSE BLDC GLUCOMTR-MCNC: 109 MG/DL (ref 70–130)
GLUCOSE BLDC GLUCOMTR-MCNC: 129 MG/DL (ref 70–130)
GLUCOSE BLDC GLUCOMTR-MCNC: 155 MG/DL (ref 70–130)
GLUCOSE SERPL-MCNC: 139 MG/DL (ref 65–99)
HCT VFR BLD AUTO: 37.2 % (ref 37.5–51)
HCT VFR BLD AUTO: 38.7 % (ref 37.5–51)
HGB BLD-MCNC: 12.1 G/DL (ref 13–17.7)
HGB BLD-MCNC: 12.4 G/DL (ref 13–17.7)
IMM GRANULOCYTES # BLD AUTO: 0.12 10*3/MM3 (ref 0–0.05)
IMM GRANULOCYTES NFR BLD AUTO: 0.9 % (ref 0–0.5)
LYMPHOCYTES # BLD AUTO: 2.12 10*3/MM3 (ref 0.7–3.1)
LYMPHOCYTES NFR BLD AUTO: 16.1 % (ref 19.6–45.3)
MCH RBC QN AUTO: 29 PG (ref 26.6–33)
MCHC RBC AUTO-ENTMCNC: 32 G/DL (ref 31.5–35.7)
MCV RBC AUTO: 90.4 FL (ref 79–97)
MONOCYTES # BLD AUTO: 0.85 10*3/MM3 (ref 0.1–0.9)
MONOCYTES NFR BLD AUTO: 6.4 % (ref 5–12)
NEUTROPHILS NFR BLD AUTO: 70.1 % (ref 42.7–76)
NEUTROPHILS NFR BLD AUTO: 9.24 10*3/MM3 (ref 1.7–7)
NRBC BLD AUTO-RTO: 0 /100 WBC (ref 0–0.2)
PLATELET # BLD AUTO: 505 10*3/MM3 (ref 140–450)
PMV BLD AUTO: 10.2 FL (ref 6–12)
POTASSIUM SERPL-SCNC: 5.6 MMOL/L (ref 3.5–5.2)
PROT SERPL-MCNC: 7.1 G/DL (ref 6–8.5)
RBC # BLD AUTO: 4.28 10*6/MM3 (ref 4.14–5.8)
SODIUM SERPL-SCNC: 138 MMOL/L (ref 136–145)
WBC NRBC COR # BLD: 13.18 10*3/MM3 (ref 3.4–10.8)

## 2022-09-14 PROCEDURE — 82962 GLUCOSE BLOOD TEST: CPT

## 2022-09-14 PROCEDURE — 80053 COMPREHEN METABOLIC PANEL: CPT | Performed by: INTERNAL MEDICINE

## 2022-09-14 PROCEDURE — 63710000001 PANTOPRAZOLE 40 MG TABLET DELAYED-RELEASE: Performed by: INTERNAL MEDICINE

## 2022-09-14 PROCEDURE — A9270 NON-COVERED ITEM OR SERVICE: HCPCS | Performed by: INTERNAL MEDICINE

## 2022-09-14 PROCEDURE — 63710000001 CETIRIZINE 10 MG TABLET: Performed by: INTERNAL MEDICINE

## 2022-09-14 PROCEDURE — 85025 COMPLETE CBC W/AUTO DIFF WBC: CPT | Performed by: INTERNAL MEDICINE

## 2022-09-14 PROCEDURE — 63710000001 PIOGLITAZONE 15 MG TABLET: Performed by: INTERNAL MEDICINE

## 2022-09-14 PROCEDURE — 63710000001 INSULIN LISPRO (HUMAN) PER 5 UNITS: Performed by: INTERNAL MEDICINE

## 2022-09-14 PROCEDURE — 63710000001 FINASTERIDE 5 MG TABLET: Performed by: INTERNAL MEDICINE

## 2022-09-14 PROCEDURE — 63710000001 CLOPIDOGREL 75 MG TABLET: Performed by: INTERNAL MEDICINE

## 2022-09-14 PROCEDURE — 63710000001 INSULIN DETEMIR PER 5 UNITS: Performed by: INTERNAL MEDICINE

## 2022-09-14 PROCEDURE — 85018 HEMOGLOBIN: CPT | Performed by: INTERNAL MEDICINE

## 2022-09-14 PROCEDURE — G0378 HOSPITAL OBSERVATION PER HR: HCPCS

## 2022-09-14 PROCEDURE — 63710000001 AMLODIPINE 2.5 MG TABLET: Performed by: INTERNAL MEDICINE

## 2022-09-14 PROCEDURE — 63710000001 METOPROLOL SUCCINATE XL 25 MG TABLET SUSTAINED-RELEASE 24 HOUR: Performed by: INTERNAL MEDICINE

## 2022-09-14 PROCEDURE — 85014 HEMATOCRIT: CPT | Performed by: INTERNAL MEDICINE

## 2022-09-14 PROCEDURE — 63710000001 ASPIRIN 81 MG TABLET DELAYED-RELEASE: Performed by: INTERNAL MEDICINE

## 2022-09-14 PROCEDURE — 63710000001 SODIUM ZIRCONIUM CYCLOSILICATE 10 G PACK: Performed by: INTERNAL MEDICINE

## 2022-09-14 PROCEDURE — 99217 PR OBSERVATION CARE DISCHARGE MANAGEMENT: CPT | Performed by: INTERNAL MEDICINE

## 2022-09-14 RX ORDER — NYSTATIN 100000 U/G
1 CREAM TOPICAL EVERY 12 HOURS SCHEDULED
Qty: 30 G | Refills: 0 | Status: SHIPPED | OUTPATIENT
Start: 2022-09-14 | End: 2022-09-21

## 2022-09-14 RX ADMIN — SODIUM ZIRCONIUM CYCLOSILICATE 15 G: 10 POWDER, FOR SUSPENSION ORAL at 12:23

## 2022-09-14 RX ADMIN — INSULIN DETEMIR 50 UNITS: 100 INJECTION, SOLUTION SUBCUTANEOUS at 09:17

## 2022-09-14 RX ADMIN — PANTOPRAZOLE SODIUM 40 MG: 40 TABLET, DELAYED RELEASE ORAL at 05:51

## 2022-09-14 RX ADMIN — CAPSAICIN 1 APPLICATION: 0.75 CREAM TOPICAL at 05:54

## 2022-09-14 RX ADMIN — PIOGLITAZONE 30 MG: 15 TABLET ORAL at 09:16

## 2022-09-14 RX ADMIN — ASPIRIN 81 MG: 81 TABLET, COATED ORAL at 09:16

## 2022-09-14 RX ADMIN — FINASTERIDE 5 MG: 5 TABLET, FILM COATED ORAL at 09:16

## 2022-09-14 RX ADMIN — TIMOLOL MALEATE 1 DROP: 5 SOLUTION/ DROPS OPHTHALMIC at 09:16

## 2022-09-14 RX ADMIN — METOPROLOL SUCCINATE 12.5 MG: 25 TABLET, EXTENDED RELEASE ORAL at 09:16

## 2022-09-14 RX ADMIN — AMLODIPINE BESYLATE 2.5 MG: 2.5 TABLET ORAL at 09:16

## 2022-09-14 RX ADMIN — INSULIN LISPRO 4 UNITS: 100 INJECTION, SOLUTION INTRAVENOUS; SUBCUTANEOUS at 12:35

## 2022-09-14 RX ADMIN — NYSTATIN 1 APPLICATION: 100000 CREAM TOPICAL at 09:16

## 2022-09-14 RX ADMIN — CLOPIDOGREL BISULFATE 75 MG: 75 TABLET ORAL at 09:16

## 2022-09-14 RX ADMIN — CETIRIZINE HYDROCHLORIDE 10 MG: 10 TABLET, FILM COATED ORAL at 09:16

## 2022-09-14 RX ADMIN — Medication 10 ML: at 09:16

## 2022-09-14 NOTE — CONSULTS
"                    Clinical Nutrition       Patient Name: Pro Pal  YOB: 1940  MRN: [id number]  Date of Encounter: 09/14/22 14:53 EDT  Admission date: 9/5/2022    Reason for Visit   Follow-up protocol, Physician consult    EMR  Reviewed   Yes    Height: Height: 185.4 cm (73\")  Weight: Weight: 96.1 kg (211 lb 14.4 oz) (09/05/22 0235)  BMI: BMI (Calculated): 28    Problem:    Odynophagia    COPD    Dyslipidemia    AGATHA on CPAP    HTN    Sepsis (HCC)    Type 2 diabetes mellitus with hyperglycemia (HCC)    Pneumonia    CAD (coronary artery disease)    Hyperkalemia    Acute renal failure superimposed on stage 3 chronic kidney disease (HCC)    Dyspnea and respiratory abnormalities    Self-catheterizes urinary bladder    Acute UTI (urinary tract infection)       Reported/Observed/Food/Nutrition Related - Comments     9/14) Pt sleeping soundly, snoring at attempt visit and does not awaken to name. In depth written diet education provided for low K diet and CKD III. Pt already seen by RD/received diet edu as below. Note pt with acute on chronic kidney injury and was not eating well at beginning of admission. RD sent Boost as pt was not eating thus Boost is appropriate as not meeting RDA for K through PO diet. Pt now eating well, MD changed to novasource renal. Pt eating well and and obese, novasource renal (475 kcal) all meals is not appropriate, RD altered to 1x/d. Pt to d/c today.    9/7) Per H&P, pt endorsed polydipsia, polyuria, and decreased appetite prior to admission. Pt reports recent general malaise at home with severely limited PO intake for 6 days due to nausea with emesis. During this time pt reports non-compliance with checking blood sugar and taking insulin dosing. Reports usual blood sugars between 140-200 during well days - A1c 11.8% may indicate . Endorsed increased appetite since admission. Pt knowledgeable on DM diet and management and verbalized recent mismanagement. Pt drinks Boost " GC at home, agreeable to receive. Reports difficulty chewing tough meats - edentulous with full denture plate. SLP evaluated pt and cleared from Regular/thins however pt endorsed sore throat with swallowing. Endorsed continued watery black stool.      Current Nutrition Prescription     Diet Regular; Cardiac, Consistent Carbohydrate, Low Potassium, Renal; 2 gm (50 mEq)  Orders Placed This Encounter      DIET MESSAGE Please send chicken salad sandwich and potato chips (baked is ok) with diet lemon lime vicky for supper, thanks!      Dietary Nutrition Supplements Novasource Renal      Average Intake from [employer].75 X last 8 meals documented     Nutrition Diagnosis    Updated:   Problem Limited adherence to nutrition related advice    Etiology DM diet   Signs/Symptoms A1c 11.8%, polydipsia, polyuria   Status: ongoing    Problem Altered nutrition related labs   Etiology JONATHAN with CKD III   Signs/Symptoms hyperkalemia   Status:    Actions     Follow treatment progress, Encourage intake, Supplement provided     May benefit from outpatient DM education if agreeable - consult placed  Provide Boost Glucose Control TID - chocolate  If unable to tolerate regular consistency, will benefit from soft foods    Monitor Per Protocol      Kayli Francois RD  Time Spent: 25min

## 2022-09-14 NOTE — DISCHARGE INSTRUCTIONS
Please continue to do self-catheterization.  Use sterile gloves and new urine catheter each time    Please refrain from drinking Gatorade and boost as they have high potassium which might be causing your potassium level to increase which can cause arrhythmias    Please note that your insulin Humulin  dose is decreased to 60 units twice daily    You need to see your endocrine specialist at  in the next 2 to 3 weeks.  Please call to schedule an appointment    You have an appointment with urology and nephrology and GI services

## 2022-09-14 NOTE — CASE MANAGEMENT/SOCIAL WORK
Case Management Discharge Note      Final Note: I met with Mr. Pal at the bedside. He will be discharged today from the hospital. We have set up [facility] for PT, OT, skilled nursing and social work. We have also set up meal service through [employer]. His daughter  from [place] will be transporting him home by car. He denies any additional discharge needs at this time.         Selected Continued Care - Admitted Since 9/5/2022     Destination    No services have been selected for the patient.              Durable Medical Equipment    No services have been selected for the patient.              Dialysis/Infusion    No services have been selected for the patient.              Home Medical Care Coordination complete.    Service Provider Selected Services Address Phone Fax Patient Preferred    [facility]  Home Health Services [address], [place] [address] [phone] --          Therapy    No services have been selected for the patient.              Community Resources    No services have been selected for the patient.              Community & DME    No services have been selected for the patient.                       Final Discharge Disposition Code: 06 - home with home health care

## 2022-09-14 NOTE — PROGRESS NOTES
"   LOS: 1 day    Patient Care Team:  Leonard Silva MBBS as PCP - General (Family Medicine)    JONATHAN follow up     Subjective     Interval History:     No acute events overnight. No new complaints        Review of Systems:   No CP or SOA , fever, chills, rigors, rash, N/V, Constipation.       Objective     Vital Sign Min/Max for last 24 hours  Temp  Min: 97.6 °F (36.4 °C)  Max: 98.2 °F (36.8 °C)   BP  Min: 109/97  Max: 153/69   Pulse  Min: 80  Max: 85   Resp  Min: 16  Max: 20   SpO2  Min: 95 %  Max: 97 %   Flow (L/min)  Min: 2  Max: 2   No data recorded     Flowsheet Rows    Flowsheet Row First Filed Value   Admission Height 185.4 cm (73\") Documented at 09/05/2022 1907   Admission Weight 102 kg (225 lb) Documented at 09/05/2022 1907          No intake/output data recorded.  I/O last 3 completed shifts:  In: -   Out: 2897 [Urine:2897]    Physical Exam:    Gen: Alert, NAD   HENT: NC, AT, EOMI   NECK: Supple, no JVD, Trachea midline   LUNGS: CTA bilaterally, non labored respirtation   CVS: S1/S2 audible, RRR, no murmur   Abd: Soft, NT, ND, BS+   Ext: No pedal edema, no cyanosis   CNS: Alert, No focal deficit noted grossly  Psy: Cooperative  Skin: Warm, dry and intact      WBC WBC   Date Value Ref Range Status   09/14/2022 13.18 (H) 3.40 - 10.80 10*3/mm3 Final   09/12/2022 12.28 (H) 3.40 - 10.80 10*3/mm3 Final      HGB Hemoglobin   Date Value Ref Range Status   09/14/2022 12.1 (L) 13.0 - 17.7 g/dL Final   09/14/2022 12.4 (L) 13.0 - 17.7 g/dL Final   09/13/2022 11.7 (L) 13.0 - 17.7 g/dL Final   09/13/2022 11.6 (L) 13.0 - 17.7 g/dL Final   09/13/2022 11.4 (L) 13.0 - 17.7 g/dL Final   09/13/2022 11.5 (L) 13.0 - 17.7 g/dL Final   09/12/2022 11.5 (L) 13.0 - 17.7 g/dL Final   09/12/2022 11.7 (L) 13.0 - 17.7 g/dL Final   09/12/2022 11.6 (L) 13.0 - 17.7 g/dL Final   09/12/2022 12.4 (L) 13.0 - 17.7 g/dL Final   09/12/2022 11.1 (L) 13.0 - 17.7 g/dL Final   09/12/2022 11.2 (L) 13.0 - 17.7 g/dL Final   09/11/2022 11.0 (L) " 13.0 - 17.7 g/dL Final   09/11/2022 10.9 (L) 13.0 - 17.7 g/dL Final      HCT Hematocrit   Date Value Ref Range Status   09/14/2022 37.2 (L) 37.5 - 51.0 % Final   09/14/2022 38.7 37.5 - 51.0 % Final   09/13/2022 35.9 (L) 37.5 - 51.0 % Final   09/13/2022 36.7 (L) 37.5 - 51.0 % Final   09/13/2022 31.4 (L) 37.5 - 51.0 % Final   09/13/2022 32.9 (L) 37.5 - 51.0 % Final   09/12/2022 33.5 (L) 37.5 - 51.0 % Final   09/12/2022 37.4 (L) 37.5 - 51.0 % Final   09/12/2022 35.3 (L) 37.5 - 51.0 % Final   09/12/2022 34.4 (L) 37.5 - 51.0 % Final   09/12/2022 31.8 (L) 37.5 - 51.0 % Final   09/12/2022 31.2 (L) 37.5 - 51.0 % Final   09/11/2022 33.8 (L) 37.5 - 51.0 % Final   09/11/2022 33.5 (L) 37.5 - 51.0 % Final      Platlets No results found for: LABPLAT   MCV MCV   Date Value Ref Range Status   09/14/2022 90.4 79.0 - 97.0 fL Final   09/12/2022 93.2 79.0 - 97.0 fL Final          Sodium Sodium   Date Value Ref Range Status   09/14/2022 138 136 - 145 mmol/L Final   09/13/2022 138 136 - 145 mmol/L Final   09/12/2022 143 136 - 145 mmol/L Final      Potassium Potassium   Date Value Ref Range Status   09/14/2022 5.6 (H) 3.5 - 5.2 mmol/L Final   09/13/2022 5.3 (H) 3.5 - 5.2 mmol/L Final     Comment:     Slight hemolysis detected by analyzer. Results may be affected.   09/12/2022 5.3 (H) 3.5 - 5.2 mmol/L Final     Comment:     Slight hemolysis detected by analyzer. Results may be affected.      Chloride Chloride   Date Value Ref Range Status   09/14/2022 105 98 - 107 mmol/L Final   09/13/2022 104 98 - 107 mmol/L Final   09/12/2022 104 98 - 107 mmol/L Final      CO2 CO2   Date Value Ref Range Status   09/14/2022 22.0 22.0 - 29.0 mmol/L Final   09/13/2022 19.0 (L) 22.0 - 29.0 mmol/L Final   09/12/2022 24.0 22.0 - 29.0 mmol/L Final      BUN BUN   Date Value Ref Range Status   09/14/2022 59 (H) 8 - 23 mg/dL Final   09/13/2022 63 (H) 8 - 23 mg/dL Final   09/12/2022 62 (H) 8 - 23 mg/dL Final      Creatinine Creatinine   Date Value Ref Range Status    09/14/2022 2.48 (H) 0.76 - 1.27 mg/dL Final   09/13/2022 2.56 (H) 0.76 - 1.27 mg/dL Final   09/12/2022 2.61 (H) 0.76 - 1.27 mg/dL Final      Calcium Calcium   Date Value Ref Range Status   09/14/2022 9.1 8.6 - 10.5 mg/dL Final   09/13/2022 9.1 8.6 - 10.5 mg/dL Final   09/12/2022 9.3 8.6 - 10.5 mg/dL Final      PO4 No results found for: CAPO4   Albumin Albumin   Date Value Ref Range Status   09/14/2022 3.60 3.50 - 5.20 g/dL Final      Magnesium No results found for: MG   Uric Acid No results found for: URICACID        Results Review:     I reviewed the patient's new clinical results.    amLODIPine, 2.5 mg, Oral, Daily  aspirin, 81 mg, Oral, Daily  capsaicin, 1 application, Topical, Q8H  cetirizine, 10 mg, Oral, Daily  clopidogrel, 75 mg, Oral, Daily  finasteride, 5 mg, Oral, Daily  insulin detemir, 50 Units, Subcutaneous, Q12H  insulin lispro, 0-24 Units, Subcutaneous, TID AC  latanoprost, 1 drop, Both Eyes, Nightly  metoprolol succinate XL, 12.5 mg, Oral, Q24H  mirtazapine, 15 mg, Oral, Nightly  nystatin, 1 application, Topical, Q12H  pantoprazole, 40 mg, Oral, Q AM  pioglitazone, 30 mg, Oral, Daily  rosuvastatin, 10 mg, Oral, Nightly  sodium chloride, 10 mL, Intravenous, Q12H  sodium zirconium cyclosilicate, 15 g, Oral, Once  timolol, 1 drop, Both Eyes, BID           Medication Review:     FINDINGS:   Right kidney:  The kidney measures 10.8 x 4.9 x 5.4 cm. Normal renal cortical  echogenicity without evidence of renal cortical atrophy. No  hydronephrosis. No echogenic shadowing foci to suggest nephrolithiasis.  No perinephric fluid. No discrete renal lesion. Normal renal hilar  vascularity on color Doppler assessment.     Left kidney: The kidney measures 12.4 x 5.5 x 4.2 cm. Normal renal  cortical echogenicity without evidence of renal cortical atrophy. No  hydronephrosis. No echogenic shadowing foci to suggest nephrolithiasis.  No perinephric fluid. No discrete renal lesion. Normal renal hilar  vascularity on  color Doppler assessment.     Bladder:  Normal appearance of the bladder. Partial visualization of enlarged  nodular prostate protruding into the inferior bladder.     IMPRESSION:  No appreciable hydronephrosis.    Assessment & Plan       Odynophagia    COPD    Dyslipidemia    AGATHA on CPAP    HTN    Sepsis (HCC)    Type 2 diabetes mellitus with hyperglycemia (HCC)    Pneumonia    CAD (coronary artery disease)    Hyperkalemia    Acute renal failure superimposed on stage 3 chronic kidney disease (HCC)    Dyspnea and respiratory abnormalities    Self-catheterizes urinary bladder    Acute UTI (urinary tract infection)    1- JONATHAN on CKD stage III - Improving slowly. Pre-renal azotemia vs Intrinsic renal disease. FeNa - 2.15% suggestive of intrinsic renal disease plus urinary retention  2- CKD stage III - Baseline Scr 1.8-2.0 range.   3- Metabolic acidosis   4- Urinary retention   5- Hyperkalemia   6- Odynophagia  7- UTI      Plan:  - Dose of lokelma 15 gm today  - Renal diet. Will change boost to nova source renal for low  K content.   - Dietitian to educate on low K diet.   - Avoid nephrotoxic agents   - Adjust meds per renal function   - No need of RRT.   - If continues to have issues of urinary retention and self catheterization - consider urology consult.     Shaji Engel MD  09/14/22  10:28 EDT

## 2022-09-15 NOTE — OUTREACH NOTE
Prep Survey    Flowsheet Row Responses   [facility] facility patient discharged from? [facility]   Is LACE score < 7 ? No   Emergency Room discharge w/ pulse ox? No   Eligibility Readm Mgmt   Discharge diagnosis Odynophagia    Does the patient have one of the following disease processes/diagnoses(primary or secondary)? Other   Does the patient have Home health ordered? Yes   What is the Home health agency?  [facility]   Is there a DME ordered? No   Prep survey completed? Yes          GO ZHOU - Registered Nurse

## 2022-09-16 ENCOUNTER — HOME CARE VISIT (OUTPATIENT)
Dept: HOME HEALTH SERVICES | Facility: HOME HEALTHCARE | Age: 82
End: 2022-09-16

## 2022-09-16 PROCEDURE — G0299 HHS/HOSPICE OF RN EA 15 MIN: HCPCS

## 2022-09-18 VITALS
DIASTOLIC BLOOD PRESSURE: 80 MMHG | OXYGEN SATURATION: 96 % | RESPIRATION RATE: 18 BRPM | TEMPERATURE: 98.1 F | SYSTOLIC BLOOD PRESSURE: 128 MMHG | HEART RATE: 88 BPM

## 2022-09-18 NOTE — HOME HEALTH
SOC Note:    Home Health ordered for: disciplines SN, PT, OT    Reason for Hosp/Primary Dx/Co-morbidities: Acute kidney failure, unspecified    Focus of Care: SN assessment respiratory and catherization technique.  Teaching Self catherization technique with emphasis on sterile technique, furhter teaching medication management, Insulin administration and diabetic desease process, with attention to diet and glucose monitoring.    Current Functional status/mobility/DME: Cane/walking stick.  Patient using sporadicly.    HB status/Living Arrangements: Lives in private home, with asssitance of adult children.    Skin Integrity/wound status: Intact, no wounds    Code Status: full code    Fall Risk: High    POC confirmed with patient, 9-16-22.

## 2022-09-19 ENCOUNTER — HOME CARE VISIT (OUTPATIENT)
Dept: HOME HEALTH SERVICES | Facility: HOME HEALTHCARE | Age: 82
End: 2022-09-19

## 2022-09-19 VITALS
DIASTOLIC BLOOD PRESSURE: 66 MMHG | HEART RATE: 100 BPM | OXYGEN SATURATION: 99 % | RESPIRATION RATE: 16 BRPM | SYSTOLIC BLOOD PRESSURE: 108 MMHG | TEMPERATURE: 97 F

## 2022-09-19 PROCEDURE — G0152 HHCP-SERV OF OT,EA 15 MIN: HCPCS

## 2022-09-19 PROCEDURE — G0151 HHCP-SERV OF PT,EA 15 MIN: HCPCS

## 2022-09-20 VITALS
HEART RATE: 99 BPM | OXYGEN SATURATION: 97 % | SYSTOLIC BLOOD PRESSURE: 142 MMHG | RESPIRATION RATE: 16 BRPM | DIASTOLIC BLOOD PRESSURE: 78 MMHG

## 2022-09-20 NOTE — HOME HEALTH
SOC Note:   Home Health ordered for: disciplines SN, PT, OT   Reason for Hosp/Primary Dx/Co-morbidities: Acute kidney failure, unspecified   Focus of Care: skilled HHPT 2wk3, 1wk3 for gait training, balance training, therapeutic exercise, pt education/HEP instruction related to AKF  Current Functional status/mobility/DME: Cane/walking stick, CGA for functional mobiltiy  HB status/Living Arrangements: Lives in private home, with asssitance of adult children; pt is homebound related to requiring an AD and the assistance of another person to safely leave home/ taxing effort/ increased risk of falls  Skin Integrity/wound status: Intact, no wounds   Code Status: full code   Fall Risk: High per Laisha

## 2022-09-20 NOTE — HOME HEALTH
Pro Pal is a 82 y.o. male who lives alone in a single story home w/ step to enter and steps in the home. 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 Pt presents w/ decrease balance, strength/endurance, lacks awareness of EC/WS w/ ADL/IADL skills, Pt plans to start cardiac rehab in 4 weeks or as MD directs. Recommend HHOT to address strength/endurance/balance, bathroom equipment/training, EC/WS principles w/ ADL/IADL skills. home safety/fall prevention skills. Frequency 1w2, 3m1

## 2022-09-21 ENCOUNTER — HOME CARE VISIT (OUTPATIENT)
Dept: HOME HEALTH SERVICES | Facility: HOME HEALTHCARE | Age: 82
End: 2022-09-21

## 2022-09-21 PROCEDURE — G0155 HHCP-SVS OF CSW,EA 15 MIN: HCPCS

## 2022-09-21 NOTE — HOME HEALTH
Met with patient who lives alone.  He said his wife  7 years ago.  His son lives close by and checks on him daily.  He said his son is looking into a  for him and possibly the senior  program through the [facility].  He has a truck and still drives some.  He drives to MD appointments.  He has a cane.  He said he has good and bad days and sometimes doesn't need to use the cane.  He didn't use it today while walking around the home.  He is a retired deputy.  He states he is usually very active and was used to hiking 11 miles per day some days.  He is hoping to get back to being able to walk in the neighborhood.  I provided him with resources including sitter agencies, senior  program, transportation, [facility] resources, pathways book of resources.  He also has my contact number to call if needed.  He has a PCP appointment tomorrow with Ning Mclaughlin, [facility], [phone].  (Initially, patient stated he didn't have a PCP because the one he had retired but then Veronika from the  office called him and he said that yes this is his PCP).  I wrote the information down for him and he knows to keep appointment in the morning at 9 am.  He denies further needs or concerns at this time.  He states he cooks his own meals and denies needing help with home delivered meals at this time.  Discussed visit with TAZ Rush, and she will see him this week.

## 2022-09-22 ENCOUNTER — HOME CARE VISIT (OUTPATIENT)
Dept: HOME HEALTH SERVICES | Facility: HOME HEALTHCARE | Age: 82
End: 2022-09-22

## 2022-09-22 NOTE — HOME HEALTH
Met with patient and provided resources.  He remains in his own home.  No further social work visits planned at this time.

## 2022-09-23 ENCOUNTER — HOME CARE VISIT (OUTPATIENT)
Dept: HOME HEALTH SERVICES | Facility: HOME HEALTHCARE | Age: 82
End: 2022-09-23

## 2022-09-23 VITALS
HEART RATE: 99 BPM | RESPIRATION RATE: 16 BRPM | OXYGEN SATURATION: 100 % | DIASTOLIC BLOOD PRESSURE: 62 MMHG | TEMPERATURE: 97.8 F | SYSTOLIC BLOOD PRESSURE: 120 MMHG

## 2022-09-23 PROCEDURE — G0157 HHC PT ASSISTANT EA 15: HCPCS

## 2022-09-23 NOTE — HOME HEALTH
Routine Visit Note:    Skill/education provided: Therapeutic exercise, balance, gait, and fall prevention education.    Patient/caregiver response: Patient requires frequent rest breaks for SOA secondary to COPD. Minimal LOB due to dizziness during standing observed. Otherwise, patient tolerated all interventions well.    Plan for next visit: Continue progression of all.    Other pertinent info: n/a

## 2022-09-27 ENCOUNTER — READMISSION MANAGEMENT (OUTPATIENT)
Dept: CALL CENTER | Facility: HOSPITAL | Age: 82
End: 2022-09-27

## 2022-09-27 ENCOUNTER — HOME CARE VISIT (OUTPATIENT)
Dept: HOME HEALTH SERVICES | Facility: HOME HEALTHCARE | Age: 82
End: 2022-09-27

## 2022-09-27 VITALS — DIASTOLIC BLOOD PRESSURE: 64 MMHG | OXYGEN SATURATION: 96 % | HEART RATE: 102 BPM | SYSTOLIC BLOOD PRESSURE: 122 MMHG

## 2022-09-27 PROCEDURE — G0157 HHC PT ASSISTANT EA 15: HCPCS

## 2022-09-27 NOTE — OUTREACH NOTE
Medical Week 2 Survey    Flowsheet Row Responses   [facility] discharged from? [facility]   Does the patient have one of the following disease processes/diagnoses(primary or secondary)? Other   Week 2 attempt successful? Yes   Call start time 1247   Discharge diagnosis Odynophagia    Call end time 1254   Meds reviewed with patient/caregiver? Yes   Is the patient having any side effects they believe may be caused by any medication additions or changes? No   Does the patient have all medications ordered at discharge? Yes   Is the patient taking all medications as directed (includes completed medication regime)? Yes   Does the patient have a primary care provider?  Yes   Does the patient have an appointment with their PCP within 7 days of discharge? Yes   Comments regarding PCP Patient has had PCP follow up   Has the patient kept scheduled appointments due by today? Yes   What is the Home health agency?  [facility]    Has home health visited the patient within 72 hours of discharge? Yes   Psychosocial issues? No   Did the patient receive a copy of their discharge instructions? Yes   Nursing interventions Reviewed instructions with patient   What is the patient's perception of their health status since discharge? Improving  [Been doing exercises on his own at home and can tell he is getting stronger]   Is the patient/caregiver able to teach back signs and symptoms related to disease process for when to call PCP? Yes   Is the patient/caregiver able to teach back signs and symptoms related to disease process for when to call 911? Yes   Is the patient/caregiver able to teach back the hierarchy of who to call/visit for symptoms/problems? PCP, Specialist, Home health nurse, Urgent Care, ED, 911 Yes   If the patient is a current smoker, are they able to teach back resources for cessation? Not a smoker   Week 2 Call Completed? Yes          RADHA BAUTISTA - Registered Nurse

## 2022-09-28 ENCOUNTER — HOME CARE VISIT (OUTPATIENT)
Dept: HOME HEALTH SERVICES | Facility: HOME HEALTHCARE | Age: 82
End: 2022-09-28

## 2022-09-29 ENCOUNTER — HOME CARE VISIT (OUTPATIENT)
Dept: HOME HEALTH SERVICES | Facility: HOME HEALTHCARE | Age: 82
End: 2022-09-29

## 2022-09-29 VITALS
HEART RATE: 98 BPM | RESPIRATION RATE: 16 BRPM | OXYGEN SATURATION: 97 % | SYSTOLIC BLOOD PRESSURE: 130 MMHG | DIASTOLIC BLOOD PRESSURE: 70 MMHG

## 2022-09-29 VITALS
HEART RATE: 98 BPM | DIASTOLIC BLOOD PRESSURE: 64 MMHG | SYSTOLIC BLOOD PRESSURE: 118 MMHG | RESPIRATION RATE: 16 BRPM | OXYGEN SATURATION: 99 % | TEMPERATURE: 97.7 F

## 2022-09-29 PROCEDURE — G0157 HHC PT ASSISTANT EA 15: HCPCS

## 2022-09-29 PROCEDURE — G0300 HHS/HOSPICE OF LPN EA 15 MIN: HCPCS

## 2022-09-29 NOTE — HOME HEALTH
PT Routine visit note    PT interventions/education addressed: standing balance exercises, therapuetic exercises and cardiopulmonary tolerance to activity    Patient response to treatment: Patient reports no dizziness with exercises today, he requires short rest breaks due to fatigue.    Plan for next visit: Outdoor ambulation w/ AD if weather permits

## 2022-09-29 NOTE — HOME HEALTH
Routine Visit Note:LPN    Skill/education provided: pt was seen by  nursing to assess, DM, integumentary, safety, resp, cardiac. Pt denies pain at this time. States he checks BG daily and reports 337 yesterday. Has not checked today. Pt educated on the importance of checking BG daily and keeping a daily log.    Patient/caregiver response: t will check BG every morning fasting and keep daily log.    Plan for next visit: educate on DM and importance of checking BG and keeping logs.    Other pertinent info:

## 2022-09-30 NOTE — HOME HEALTH
PT Routine visit note    PT intervention/education addressed: therapuetic exercises, standing balance, and cardiopulmonary tolerance to activity    Patient response to treatment: upgraded balance exercises, no lob vital WNL's and no c/o dizziness    Plan for next visit: outdoor ambulation if weather permits

## 2022-10-03 ENCOUNTER — HOME CARE VISIT (OUTPATIENT)
Dept: HOME HEALTH SERVICES | Facility: HOME HEALTHCARE | Age: 82
End: 2022-10-03

## 2022-10-03 PROCEDURE — G0299 HHS/HOSPICE OF RN EA 15 MIN: HCPCS

## 2022-10-04 ENCOUNTER — HOME CARE VISIT (OUTPATIENT)
Dept: HOME HEALTH SERVICES | Facility: HOME HEALTHCARE | Age: 82
End: 2022-10-04

## 2022-10-04 VITALS
DIASTOLIC BLOOD PRESSURE: 78 MMHG | HEART RATE: 100 BPM | RESPIRATION RATE: 16 BRPM | SYSTOLIC BLOOD PRESSURE: 138 MMHG | OXYGEN SATURATION: 97 %

## 2022-10-04 PROCEDURE — G0157 HHC PT ASSISTANT EA 15: HCPCS

## 2022-10-05 ENCOUNTER — READMISSION MANAGEMENT (OUTPATIENT)
Dept: CALL CENTER | Facility: HOSPITAL | Age: 82
End: 2022-10-05

## 2022-10-05 ENCOUNTER — HOME CARE VISIT (OUTPATIENT)
Dept: HOME HEALTH SERVICES | Facility: HOME HEALTHCARE | Age: 82
End: 2022-10-05

## 2022-10-05 VITALS — HEART RATE: 93 BPM | DIASTOLIC BLOOD PRESSURE: 78 MMHG | SYSTOLIC BLOOD PRESSURE: 146 MMHG | OXYGEN SATURATION: 96 %

## 2022-10-05 PROCEDURE — G0152 HHCP-SERV OF OT,EA 15 MIN: HCPCS

## 2022-10-05 NOTE — OUTREACH NOTE
Medical Week 3 Survey    Flowsheet Row Responses   [facility] discharged from? [facility]   Does the patient have one of the following disease processes/diagnoses(primary or secondary)? Other   Week 3 attempt successful? Yes   Call start time 1300   Call end time 1327   Discharge diagnosis Odynophagia    Person spoke with today (if not patient) and relationship Patient   Meds reviewed with patient/caregiver? Yes   Is the patient having any side effects they believe may be caused by any medication additions or changes? No   Does the patient have all medications ordered at discharge? Yes   Is the patient taking all medications as directed (includes completed medication regime)? Yes   Does the patient have a primary care provider?  Yes   Does the patient have an appointment with their PCP within 7 days of discharge? Yes   Has the patient kept scheduled appointments due by today? Yes   What is the Home health agency?  [facility]    Has home health visited the patient within 72 hours of discharge? Yes   Psychosocial issues? No   What is the patient's perception of their health status since discharge? Improving   Is the patient/caregiver able to teach back signs and symptoms related to disease process for when to call PCP? Yes   Is the patient/caregiver able to teach back signs and symptoms related to disease process for when to call 911? Yes   Is the patient/caregiver able to teach back the hierarchy of who to call/visit for symptoms/problems? PCP, Specialist, Home health nurse, Urgent Care, ED, 911 Yes   Week 3 Call Completed? Yes   Wrap up additional comments Pt states he is doing good. Pt inquired whether he should take his Humulin as his BS level was 125,  pt encouraged to take insulin 2x dayily before meals as long as he is eating. Pt advised to call MD to ask for guidelines if not eating. Pt verbalized understanding. Pt shares he took a walk with , and is doing well.          JADYN S - Registered  Nurse

## 2022-10-06 NOTE — HOME HEALTH
Routine Visit Note:    Skill/education provided: ADL retraining, shower transfer and safety, establish and complete UB HEP, functional mobility outside the home for endurance training    Patient/caregiver response: Patient tolerated therapy session well, reports feeling stronger each day.    Plan for next visit: ADL's, balance activity/standing tolerance, light meal prep    Other pertinent info:

## 2022-10-07 ENCOUNTER — HOME CARE VISIT (OUTPATIENT)
Dept: HOME HEALTH SERVICES | Facility: HOME HEALTHCARE | Age: 82
End: 2022-10-07

## 2022-10-07 VITALS
SYSTOLIC BLOOD PRESSURE: 140 MMHG | RESPIRATION RATE: 16 BRPM | HEART RATE: 85 BPM | DIASTOLIC BLOOD PRESSURE: 70 MMHG | OXYGEN SATURATION: 95 %

## 2022-10-07 PROCEDURE — G0157 HHC PT ASSISTANT EA 15: HCPCS

## 2022-10-07 NOTE — HOME HEALTH
PT Routine visit note    PT interventions/education addressed: outdoor ambulation, in/out of home w/ AD    Pt. response: no lob or gait deviation on outdoor uneven surfaces w/ AD today, endurance improved w/ vitals WNL's    Plan for next visit: upgrade HEP if tolerated

## 2022-10-08 VITALS
OXYGEN SATURATION: 95 % | RESPIRATION RATE: 18 BRPM | HEART RATE: 82 BPM | DIASTOLIC BLOOD PRESSURE: 80 MMHG | SYSTOLIC BLOOD PRESSURE: 132 MMHG | TEMPERATURE: 97.7 F

## 2022-10-08 NOTE — HOME HEALTH
"Focus of Care: SN assessment respiratory and catherization technique.  Teaching Self catherization technique with emphasis on sterile technique, furhter teaching medication management, Insulin administration and diabetic desease process.  Patient has been non-compliant with Daily glucose testing.  Teaching done with patient R/T daily blood sugar monitoring.  Demonstrated use of glucometer with patient.  Encouraged patient to keep daily log of blood sugar readings.  Patient refused SN furthr teaching with Insulin adminstration.  Stated he was \" good at it\".        Contiue to re-inforce diabetic teaching with patient at small increments as he tolerates."

## 2022-10-10 ENCOUNTER — HOME CARE VISIT (OUTPATIENT)
Dept: HOME HEALTH SERVICES | Facility: HOME HEALTHCARE | Age: 82
End: 2022-10-10

## 2022-10-10 PROCEDURE — G0152 HHCP-SERV OF OT,EA 15 MIN: HCPCS

## 2022-10-10 NOTE — HOME HEALTH
PT Routine visit note    PT interventions/education provided: standing balance exercises and eduation regarding monitoring blood sugar regularly    Pt. response: pt. tolerates progression of standing dynamic exercises, pt. reports no increase in knee pain post tx.    Plan for next visit: continue working on cardiopulmonary tolerance to activity and standing balance

## 2022-10-12 VITALS
HEART RATE: 72 BPM | RESPIRATION RATE: 16 BRPM | SYSTOLIC BLOOD PRESSURE: 141 MMHG | DIASTOLIC BLOOD PRESSURE: 72 MMHG | OXYGEN SATURATION: 94 %

## 2022-10-13 ENCOUNTER — OFFICE VISIT (OUTPATIENT)
Dept: CARDIOLOGY | Facility: CLINIC | Age: 82
End: 2022-10-13

## 2022-10-13 VITALS
HEIGHT: 73 IN | OXYGEN SATURATION: 97 % | HEART RATE: 95 BPM | DIASTOLIC BLOOD PRESSURE: 66 MMHG | WEIGHT: 218 LBS | BODY MASS INDEX: 28.89 KG/M2 | SYSTOLIC BLOOD PRESSURE: 124 MMHG

## 2022-10-13 DIAGNOSIS — I25.10 CORONARY ARTERY DISEASE INVOLVING NATIVE CORONARY ARTERY OF NATIVE HEART WITHOUT ANGINA PECTORIS: Primary | ICD-10-CM

## 2022-10-13 DIAGNOSIS — I10 ESSENTIAL HYPERTENSION: ICD-10-CM

## 2022-10-13 DIAGNOSIS — E78.5 DYSLIPIDEMIA: ICD-10-CM

## 2022-10-13 DIAGNOSIS — E78.5 HYPERLIPIDEMIA LDL GOAL <70: ICD-10-CM

## 2022-10-13 PROCEDURE — 99214 OFFICE O/P EST MOD 30 MIN: CPT | Performed by: NURSE PRACTITIONER

## 2022-10-13 NOTE — HOME HEALTH
Pt met OT goals, recommend out pt physical therapy as pt is no longer homebound. d/c from  on this date.

## 2023-09-11 NOTE — PROGRESS NOTES
Pro Pal  1940  [phone]  There is no work phone number on file.      09/13/2023    Leonard Silva MBBS    Chief Complaint   Patient presents with    Coronary artery disease involving native coronary artery of     Problem List:  Coronary artery disease  Inferolateral STEMI, 5/2021  Complicated by bradycardia/complete HB requiring temporary pacing.   LHC, 5/24/2021: s/p PTCA/BRENNEN to dominant circumflex.  Echocardiogram, 5/25/2021: EF 55%. Grade II diastolic dysfunction, mild MR, mild TR. RVSP 39  MmHg.  Hyperlipidemia   Type II diabetes mellitus  COPD  CKD   US renal limited 09/12/2022 (15:06): no appreciable hydronephrosis  US renal limited 09/12/2022 (22:26): mild left-sided hydronephrosis. No evidence of right-sided hydronephrosis.  Hospitalized 9/2022 with urosepsis and JONATHAN on CKD.  Now following nephrology.  Last creatinine 2.33  BPH, in & out cath's  [facility]  BPH, in & out cath's  [facility]      No Known Allergies    Current Medications:      Current Outpatient Medications:     acetaminophen (TYLENOL) 500 MG tablet, Take 1 tablet by mouth Every 6 (Six) Hours As Needed for Mild Pain., Disp: , Rfl:     albuterol sulfate  (90 Base) MCG/ACT inhaler, Inhale 2 puffs Every 4 (Four) Hours As Needed for Wheezing., Disp: , Rfl:     amLODIPine (NORVASC) 2.5 MG tablet, Take 1 tablet by mouth Daily., Disp: 30 tablet, Rfl: 11    aspirin 81 MG EC tablet, Take 1 tablet by mouth Daily., Disp: , Rfl:     capsaicin (ZOSTRIX) 0.025 % cream, Apply 1 application  topically to the appropriate area as directed 2 (Two) Times a Day As Needed. For pain, Disp: , Rfl:     clopidogrel (PLAVIX) 75 MG tablet, Take 1 tablet by mouth Daily., Disp: 30 tablet, Rfl: 11    Dulaglutide (Trulicity) 1.5 MG/0.5ML solution pen-injector, Inject 1.5 mg under the skin into the appropriate area as directed 1 (One) Time Per Week., Disp: , Rfl:     famotidine (PEPCID) 20 MG tablet, Take 1 tablet by mouth 2 (Two)  Times a Day As Needed for Heartburn., Disp: , Rfl:     finasteride (PROSCAR) 5 MG tablet, Take 1 tablet by mouth Daily., Disp: , Rfl:     Insulin Regular Human, Conc, (HumuLIN R U-500 KwikPen) 500 UNIT/ML solution pen-injector CONCENTRATED injection, Inject 60 Units under the skin into the appropriate area as directed 2 (Two) Times a Day With Meals for 30 days. 135 units with breakfast and 125 units with dinner, Disp: 7.2 mL, Rfl: 0    latanoprost (XALATAN) 0.005 % ophthalmic solution, Administer 1 drop to both eyes Every Night., Disp: , Rfl:     loratadine (CLARITIN) 10 MG tablet, Take 1 tablet by mouth As Needed for Allergies., Disp: , Rfl:     MAGNESIUM GLUCONATE PO, Take 400 mg by mouth Daily., Disp: , Rfl:     nitroglycerin (NITROSTAT) 0.4 MG SL tablet, 1 under the tongue as needed for angina, may repeat q5mins for up three doses, Disp: 100 tablet, Rfl: 11    NovoLOG Mix 70/30 FlexPen (70-30) 100 UNIT/ML suspension pen-injector injection, , Disp: , Rfl:     Omega-3 Fatty Acids (fish oil) 1000 MG capsule capsule, Take 1 capsule by mouth 2 (Two) Times a Day With Meals., Disp: , Rfl:     pantoprazole (PROTONIX) 40 MG EC tablet, Take 1 tablet by mouth Daily., Disp: , Rfl:     pravastatin (PRAVACHOL) 40 MG tablet, Take 1 tablet by mouth Daily., Disp: , Rfl:     rosuvastatin (CRESTOR) 10 MG tablet, Take 1 tablet by mouth Every Night., Disp: 30 tablet, Rfl: 11    tamsulosin (FLOMAX) 0.4 MG capsule 24 hr capsule, Take 1 capsule by mouth Daily., Disp: , Rfl:     timolol (TIMOPTIC) 0.5 % ophthalmic solution, Administer 1 drop to both eyes 2 (Two) Times a Day., Disp: , Rfl:     metoprolol succinate XL (TOPROL-XL) 25 MG 24 hr tablet, Take 0.5 tablets by mouth Daily. (Patient not taking: Reported on 9/13/2023), Disp: 30 tablet, Rfl: 11    HPI    Patient is an 83-year-old gentleman who returns today for follow-up of his coronary artery disease and cardiac risk factors.  Since his last visit he has not had any  "hospitalizations or new medical diagnoses.  For reasons that are unclear he is not taking metoprolol succinate.  He thinks he is taking pravastatin and not rosuvastatin.  He has not had a recent lipid panel or CMP.  It was ordered at his last visit but when he went up to the outpatient lab he waited an hour and a half and decided to leave.  He denies any exertional chest pain, dyspnea, orthopnea, palpitations or syncope.  He is extremely hard of hearing.  He does report having recent episode of diarrhea and was unable to take his medications for a couple of days.  He has type 2 diabetes mellitus and most recent hemoglobin A1c he reports was 8.0.        The following portions of the patient's history were reviewed and updated as appropriate: allergies, current medications and problem list.    The above systems were reviewed and pertinent positives were noted.    Vitals:    09/13/23 1533   BP: 134/72   BP Location: Left arm   Patient Position: Sitting   Cuff Size: Adult   Pulse: 96   SpO2: 98%   Weight: 95.4 kg (210 lb 6.4 oz)   Height: 185.4 cm (73\")           Constitutional:       Appearance: Healthy appearance. Well-developed.   Eyes:      General: Lids are normal. No scleral icterus.     Conjunctiva/sclera: Conjunctivae normal.   HENT:      Head: Normocephalic and atraumatic.   Neck:      Thyroid: No thyromegaly.      Vascular: No carotid bruit or JVD.   Pulmonary:      Effort: Pulmonary effort is normal.      Breath sounds: Normal breath sounds. No wheezing. No rhonchi. No rales.   Cardiovascular:      Normal rate. Regular rhythm.      Murmurs: There is no murmur.      No gallop.  No rub.   Pulses:     Intact distal pulses.   Edema:     Peripheral edema absent.   Abdominal:      General: There is no distension.      Palpations: Abdomen is soft. There is no abdominal mass.   Musculoskeletal:      Cervical back: Normal range of motion. Skin:     General: Skin is warm and dry.      Findings: No rash.   Neurological: "      General: No focal deficit present.      Mental Status: Alert and oriented to person, place, and time.      Gait: Gait is intact.   Psychiatric:         Attention and Perception: Attention normal.         Mood and Affect: Mood normal.         Behavior: Behavior normal.      Diagnostic Data:    Lab Results   Component Value Date    CHOL 176 05/25/2021     Lab Results   Component Value Date    TRIG 389 (H) 04/04/2022    TRIG 113 06/07/2021    TRIG 212 (H) 05/25/2021     Lab Results   Component Value Date    HDL 33 (L) 04/04/2022    HDL 32 (L) 06/07/2021    HDL 34 (L) 05/25/2021     No components found for: LDLCALC    Procedures    Assessment:      ICD-10-CM ICD-9-CM   1. Coronary artery disease involving native coronary artery of native heart without angina pectoris  I25.10 414.01   2. Essential hypertension  I10 401.9   3. Hyperlipidemia LDL goal <70  E78.5 272.4       Plan:    Start metoprolol succinate 12.5 mg daily for hypertension and coronary artery disease  Continue aspirin and Plavix for coronary artery disease with PCI  Continue amlodipine 2.5 mg daily for hypertension  Continue pravastatin 40 mg daily for hyperlipidemia  Obtain CMP and lipid profile  Follow-up with endocrinology for better control of diabetes mellitus.  Goal is for hemoglobin A1c to be 7.0 or less.  F/up in 6 months or sooner if needed.      HERMELINDA Cueto

## 2023-09-13 ENCOUNTER — OFFICE VISIT (OUTPATIENT)
Dept: CARDIOLOGY | Facility: CLINIC | Age: 83
End: 2023-09-13
Payer: MEDICARE

## 2023-09-13 VITALS
WEIGHT: 210.4 LBS | HEIGHT: 73 IN | BODY MASS INDEX: 27.89 KG/M2 | HEART RATE: 96 BPM | SYSTOLIC BLOOD PRESSURE: 134 MMHG | DIASTOLIC BLOOD PRESSURE: 72 MMHG | OXYGEN SATURATION: 98 %

## 2023-09-13 DIAGNOSIS — I25.10 CORONARY ARTERY DISEASE INVOLVING NATIVE CORONARY ARTERY OF NATIVE HEART WITHOUT ANGINA PECTORIS: Primary | ICD-10-CM

## 2023-09-13 DIAGNOSIS — E78.5 HYPERLIPIDEMIA LDL GOAL <70: ICD-10-CM

## 2023-09-13 DIAGNOSIS — I10 ESSENTIAL HYPERTENSION: ICD-10-CM

## 2023-09-13 PROCEDURE — 99214 OFFICE O/P EST MOD 30 MIN: CPT | Performed by: NURSE PRACTITIONER

## 2023-09-13 PROCEDURE — 3075F SYST BP GE 130 - 139MM HG: CPT | Performed by: NURSE PRACTITIONER

## 2023-09-13 PROCEDURE — 1159F MED LIST DOCD IN RCRD: CPT | Performed by: NURSE PRACTITIONER

## 2023-09-13 PROCEDURE — 3078F DIAST BP <80 MM HG: CPT | Performed by: NURSE PRACTITIONER

## 2023-09-13 PROCEDURE — 1160F RVW MEDS BY RX/DR IN RCRD: CPT | Performed by: NURSE PRACTITIONER

## 2023-09-13 RX ORDER — PRAVASTATIN SODIUM 40 MG
40 TABLET ORAL DAILY
COMMUNITY

## 2023-09-13 RX ORDER — METOPROLOL SUCCINATE 25 MG/1
12.5 TABLET, EXTENDED RELEASE ORAL
Qty: 90 TABLET | Refills: 1 | Status: SHIPPED | OUTPATIENT
Start: 2023-09-13

## 2023-09-13 RX ORDER — INSULIN ASPART 100 [IU]/ML
INJECTION, SUSPENSION SUBCUTANEOUS
COMMUNITY
Start: 2023-07-24

## 2023-09-13 RX ORDER — METOPROLOL SUCCINATE 25 MG/1
12.5 TABLET, EXTENDED RELEASE ORAL
Qty: 30 TABLET | Refills: 11 | Status: SHIPPED | OUTPATIENT
Start: 2023-09-13 | End: 2023-09-13 | Stop reason: SDUPTHER

## 2023-09-13 RX ORDER — PANTOPRAZOLE SODIUM 40 MG/1
40 TABLET, DELAYED RELEASE ORAL DAILY
COMMUNITY

## 2024-01-05 ENCOUNTER — TRANSCRIBE ORDERS (OUTPATIENT)
Dept: ADMINISTRATIVE | Facility: HOSPITAL | Age: 84
End: 2024-01-05
Payer: MEDICARE

## 2024-01-05 ENCOUNTER — HOSPITAL ENCOUNTER (OUTPATIENT)
Dept: GENERAL RADIOLOGY | Facility: HOSPITAL | Age: 84
Discharge: HOME | End: 2024-01-05
Admitting: FAMILY MEDICINE
Payer: MEDICARE

## 2024-01-05 DIAGNOSIS — R19.7 DIARRHEA, UNSPECIFIED TYPE: Primary | ICD-10-CM

## 2024-01-05 PROCEDURE — 74018 RADEX ABDOMEN 1 VIEW: CPT

## 2024-05-30 ENCOUNTER — OFFICE VISIT (OUTPATIENT)
Dept: CARDIOLOGY | Facility: CLINIC | Age: 84
End: 2024-05-30
Payer: MEDICARE

## 2024-05-30 VITALS
DIASTOLIC BLOOD PRESSURE: 54 MMHG | WEIGHT: 217.4 LBS | HEART RATE: 94 BPM | SYSTOLIC BLOOD PRESSURE: 126 MMHG | BODY MASS INDEX: 27.9 KG/M2 | OXYGEN SATURATION: 97 % | HEIGHT: 74 IN

## 2024-05-30 DIAGNOSIS — I10 ESSENTIAL HYPERTENSION: ICD-10-CM

## 2024-05-30 DIAGNOSIS — E78.5 HYPERLIPIDEMIA LDL GOAL <70: ICD-10-CM

## 2024-05-30 DIAGNOSIS — I25.10 CORONARY ARTERY DISEASE INVOLVING NATIVE CORONARY ARTERY OF NATIVE HEART WITHOUT ANGINA PECTORIS: Primary | ICD-10-CM

## 2024-05-30 PROCEDURE — 3078F DIAST BP <80 MM HG: CPT | Performed by: PHYSICIAN ASSISTANT

## 2024-05-30 PROCEDURE — 3074F SYST BP LT 130 MM HG: CPT | Performed by: PHYSICIAN ASSISTANT

## 2024-05-30 PROCEDURE — 1160F RVW MEDS BY RX/DR IN RCRD: CPT | Performed by: PHYSICIAN ASSISTANT

## 2024-05-30 PROCEDURE — 93000 ELECTROCARDIOGRAM COMPLETE: CPT | Performed by: PHYSICIAN ASSISTANT

## 2024-05-30 PROCEDURE — 99214 OFFICE O/P EST MOD 30 MIN: CPT | Performed by: PHYSICIAN ASSISTANT

## 2024-05-30 PROCEDURE — 1159F MED LIST DOCD IN RCRD: CPT | Performed by: PHYSICIAN ASSISTANT

## 2024-05-30 RX ORDER — MELATONIN
1000 DAILY
COMMUNITY
Start: 2023-10-25 | End: 2024-10-24

## 2024-05-30 NOTE — PROGRESS NOTES
[facility]    Date: 2024    Patient ID: Pro Pal is a 83 y.o. male   : 1940   Contact: [phone]         Chief Complaint:    Chief Complaint   Patient presents with    Coronary Artery Disease       Problem List:  Coronary artery disease  Inferolateral STEMI, 2021  Complicated by bradycardia/complete HB requiring temporary pacing.   LHC, 2021: s/p PTCA/BRENNEN to dominant circumflex.  Echocardiogram, 2021: EF 55%. Grade II diastolic dysfunction, mild MR, mild TR. RVSP 39  MmHg.  Hyperlipidemia   Type II diabetes mellitus  COPD  CKD   US renal limited 2022 (15:06): no appreciable hydronephrosis   renal limited 2022 (22:26): mild left-sided hydronephrosis. No evidence of right-sided hydronephrosis.  Hospitalized 2022 with urosepsis and JONATHAN on CKD.  Now following nephrology.  Last creatinine 2.33  BPH, in & out cath's  [facility]      No Known Allergies      Current Outpatient Medications:     acetaminophen (TYLENOL) 500 MG tablet, Take 1 tablet by mouth Every 6 (Six) Hours As Needed for Mild Pain., Disp: , Rfl:     albuterol sulfate  (90 Base) MCG/ACT inhaler, Inhale 2 puffs Every 4 (Four) Hours As Needed for Wheezing., Disp: , Rfl:     amLODIPine (NORVASC) 2.5 MG tablet, Take 1 tablet by mouth Daily., Disp: 30 tablet, Rfl: 11    aspirin 81 MG EC tablet, Take 1 tablet by mouth Daily., Disp: , Rfl:     capsaicin (ZOSTRIX) 0.025 % cream, Apply 1 Application topically to the appropriate area as directed 2 (Two) Times a Day As Needed. For pain, Disp: , Rfl:     Cholecalciferol 25 MCG (1000 UT) tablet, Take 1 tablet by mouth Daily., Disp: , Rfl:     clopidogrel (PLAVIX) 75 MG tablet, Take 1 tablet by mouth Daily., Disp: 30 tablet, Rfl: 11    famotidine (PEPCID) 20 MG tablet, Take 1 tablet by mouth 2 (Two) Times a Day As Needed for Heartburn., Disp: , Rfl:     finasteride (PROSCAR) 5 MG tablet, Take 1 tablet by mouth Daily.,  Disp: , Rfl:     latanoprost (XALATAN) 0.005 % ophthalmic solution, Administer 1 drop to both eyes Every Night., Disp: , Rfl:     loratadine (CLARITIN) 10 MG tablet, Take 1 tablet by mouth As Needed for Allergies., Disp: , Rfl:     MAGNESIUM GLUCONATE PO, Take 400 mg by mouth Daily., Disp: , Rfl:     metoprolol succinate XL (TOPROL-XL) 25 MG 24 hr tablet, Take 0.5 tablets by mouth Daily. (Patient taking differently: Take 1 tablet by mouth 2 (Two) Times a Day.), Disp: 90 tablet, Rfl: 1    nitroglycerin (NITROSTAT) 0.4 MG SL tablet, 1 under the tongue as needed for angina, may repeat q5mins for up three doses, Disp: 100 tablet, Rfl: 11    NovoLOG Mix 70/30 FlexPen (70-30) 100 UNIT/ML suspension pen-injector injection, Inject 0.58 mL under the skin into the appropriate area as directed 2 (Two) Times a Day Before Meals., Disp: , Rfl:     Omega-3 Fatty Acids (fish oil) 1000 MG capsule capsule, Take 1 capsule by mouth 2 (Two) Times a Day With Meals., Disp: , Rfl:     pantoprazole (PROTONIX) 40 MG EC tablet, Take 1 tablet by mouth Daily., Disp: , Rfl:     pravastatin (PRAVACHOL) 40 MG tablet, Take 1 tablet by mouth Daily., Disp: , Rfl:     tamsulosin (FLOMAX) 0.4 MG capsule 24 hr capsule, Take 1 capsule by mouth Daily., Disp: , Rfl:     timolol (TIMOPTIC) 0.5 % ophthalmic solution, Administer 1 drop to both eyes 2 (Two) Times a Day., Disp: , Rfl:     Dulaglutide (Trulicity) 1.5 MG/0.5ML solution pen-injector, Inject 1.5 mg under the skin into the appropriate area as directed 1 (One) Time Per Week. (Patient not taking: Reported on 5/30/2024), Disp: , Rfl:      History of Present Illness: Pro Pal is a 83 y.o. male who is here today for follow-up on CAD, hypertension and hyperlipidemia.  Since last visit patient overall has been doing well.  Patient again this visit is unsure of all the medicines he is on.  Reports that he is taking metoprolol but I do not show that the patient has had a recent refill of this  "medication.  Patient's blood pressure seems to be under control today.  Patient tries to stay active but does not have a regular exercise plan.  Patient denies any chest pain, increased shortness of breath or lower extremity edema.  Patient does not believe he has had any recent blood work to check his cholesterol.      The following portions of the patient's history were reviewed and updated as appropriate: allergies, current medications and problem list.     Pertinent positives as listed in the HPI.  All other systems reviewed are negative.            Vitals:    05/30/24 1335   BP: 126/54   BP Location: Left arm   Patient Position: Sitting   Pulse: 94   SpO2: 97%   Weight: 98.6 kg (217 lb 6.4 oz)   Height: 186.7 cm (73.5\")     Body mass index is 28.29 kg/m².    Physical Exam:  General: Alert and oriented.  Neck: Jugular venous pressure is within normal limits. Carotids have normal upstrokes without bruits.   Cardiovascular: Regular rate and rhythm. No murmur, gallop or rub.  Lungs: Clear, no rales or wheezes. Equal expansion is noted.   Extremities: No peripheral edema  Skin: Warm and dry.  Neurologic: Nonfocal.     Diagnostic data (reviewed with patient):  CMP:   Lab Results   Component Value Date    GLUCOSE 139 (H) 09/14/2022    BUN 59 (H) 09/14/2022    CREATININE 2.48 (H) 09/14/2022    EGFRIFNONA 31 (L) 05/24/2022    EGFRIFAFRI 37 (L) 05/24/2022    BCR 23.8 09/14/2022    K 5.6 (H) 09/14/2022    CO2 22.0 09/14/2022    CALCIUM 9.1 09/14/2022    ALBUMIN 3.60 09/14/2022    AST 19 09/14/2022    ALT 24 09/14/2022       CBC:    Lab Results   Component Value Date    WBC 8.49 02/12/2024    HGB 12.7 (L) 02/12/2024    HCT 38.3 (L) 02/12/2024    MCV 89 02/12/2024     02/12/2024       LIPIDS:    Lab Results   Component Value Date    CHOL 176 05/25/2021    CHLPL 206 (H) 04/04/2022    TRIG 389 (H) 04/04/2022    HDL 33 (L) 04/04/2022    LDL 95.2 04/04/2022       HgbA1c:    Lab Results   Component Value Date    HGBA1C " 8.0 04/14/2023       ECG 12 Lead    Date/Time: 5/30/2024 3:41 PM  Performed by: Genna Patterson PA-C    Authorized by: Genna Patterson PA-C  Comparison: compared with previous ECG from 9/6/2022  Similar to previous ECG  Rhythm: sinus rhythm  BPM: 92  Conduction: right bundle branch block           Advance Care Planning   ACP discussion was declined by the patient. Patient does not have an advance directive, declines further assistance.            Assessment:  1. Hyperlipidemia LDL goal <70    2. Coronary artery disease involving native coronary artery of native heart without angina pectoris    3. Essential hypertension             Plan:  Stable cardiac status.  Discussed with patient his heart rate is a little bit fast today and he needs to confirm with us if he is taking the metoprolol as I do not see a recent refill on the patient's medicine.  I have encouraged the patient to bring a list next time with his current medications.  I also have asked the patient to go to the seventh floor today to get blood work to check his lipid panel as he has not had one in quite some time.  Continue aspirin 81 mg for antiplatelet therapy.  Continue amlodipine 2.5 mg daily for hypertension.  It also appears patient may be on lisinopril 2.5 mg for hypertension as well.  Continue all other current medications.  F/up in 12 months, sooner if needed.

## 2025-06-12 ENCOUNTER — TELEPHONE (OUTPATIENT)
Dept: CARDIOLOGY | Facility: CLINIC | Age: 85
End: 2025-06-12
Payer: MEDICARE

## 2025-06-12 NOTE — TELEPHONE ENCOUNTER
No Show Follow Up Call    Patient states he called office and left a voicemail to cancel and reschedule.    No show removed from chart. Patient requesting a call back from Referral/ to get rescheduled.

## (undated) DEVICE — SINGLE-USE BIOPSY FORCEPS: Brand: RADIAL JAW 4

## (undated) DEVICE — CATH DIAG EXPO M/ PK 6FR FL4/FR4 PIG 3PK

## (undated) DEVICE — LUBE GEL ENDOGLIDE 1.1OZ

## (undated) DEVICE — NC TREK CORONARY DILATATION CATHETER 3.25 MM X 20 MM / RAPID-EXCHANGE: Brand: NC TREK

## (undated) DEVICE — SAFELINER SUCTION CANISTER 1000CC: Brand: DEROYAL

## (undated) DEVICE — PINNACLE INTRODUCER SHEATH: Brand: PINNACLE

## (undated) DEVICE — TUBING, SUCTION, 1/4" X 10', STRAIGHT: Brand: MEDLINE

## (undated) DEVICE — HI-TORQUE PILOT 150 GUIDE WIRE .014 STRAIGHT TIP 3.0 CM X 190 CM: Brand: HI-TORQUE PILOT

## (undated) DEVICE — GW PERIPH VASC ADX J/TP SS .035 150CM 3MM

## (undated) DEVICE — CATH PACE PACEL BIPOL 5F110CM

## (undated) DEVICE — SPNG VERSALON 4X4 4PLY NONSTRL LF BG/200

## (undated) DEVICE — KT MANIFOLD CATHLAB CUST

## (undated) DEVICE — HYBRID CO2 TUBING/CAP SET FOR OLYMPUS® SCOPES & CO2 SOURCE: Brand: ERBE

## (undated) DEVICE — GUIDE CATHETER: Brand: MACH1™

## (undated) DEVICE — SYR LUERLOK 50ML

## (undated) DEVICE — SOL IRR H2O BTL 1000ML STRL

## (undated) DEVICE — INTRO ACCSR BLNT TP

## (undated) DEVICE — KT ORCA ORCAPOD DISP STRL

## (undated) DEVICE — MINI TREK CORONARY DILATATION CATHETER 2.0 MM X 15 MM / RAPID-EXCHANGE: Brand: MINI TREK

## (undated) DEVICE — PK CATH CARD 10

## (undated) DEVICE — THE BITE BLOCK MAXI, LATEX FREE STRAP IS USED TO PROTECT THE ENDOSCOPE INSERTION TUBE FROM BEING BITTEN BY THE PATIENT.

## (undated) DEVICE — CONTN GRAD MEAS TRIANG 32OZ BLK

## (undated) DEVICE — DEV INFL MONARCH 25W